# Patient Record
Sex: MALE | Race: WHITE | NOT HISPANIC OR LATINO | Employment: OTHER | URBAN - METROPOLITAN AREA
[De-identification: names, ages, dates, MRNs, and addresses within clinical notes are randomized per-mention and may not be internally consistent; named-entity substitution may affect disease eponyms.]

---

## 2023-04-20 ENCOUNTER — TRANSCRIBE ORDERS (OUTPATIENT)
Dept: ADMINISTRATIVE | Facility: HOSPITAL | Age: 63
End: 2023-04-20
Payer: MEDICARE

## 2023-04-20 DIAGNOSIS — R91.8 LUNG MASS: Primary | ICD-10-CM

## 2023-04-23 ENCOUNTER — APPOINTMENT (OUTPATIENT)
Dept: CT IMAGING | Facility: HOSPITAL | Age: 63
End: 2023-04-23
Payer: MEDICARE

## 2023-04-23 ENCOUNTER — HOSPITAL ENCOUNTER (INPATIENT)
Facility: HOSPITAL | Age: 63
LOS: 11 days | Discharge: NURSING, REHAB OR LONG-TERM CARE | End: 2023-05-04
Attending: EMERGENCY MEDICINE | Admitting: HOSPITALIST
Payer: MEDICARE

## 2023-04-23 ENCOUNTER — APPOINTMENT (OUTPATIENT)
Dept: GENERAL RADIOLOGY | Facility: HOSPITAL | Age: 63
End: 2023-04-23
Payer: MEDICARE

## 2023-04-23 DIAGNOSIS — N39.0 ACUTE UTI: ICD-10-CM

## 2023-04-23 DIAGNOSIS — N17.9 AKI (ACUTE KIDNEY INJURY): ICD-10-CM

## 2023-04-23 DIAGNOSIS — R91.8 MASS OF LEFT LUNG: Primary | ICD-10-CM

## 2023-04-23 DIAGNOSIS — Z78.9 FULL CODE STATUS: ICD-10-CM

## 2023-04-23 DIAGNOSIS — I48.91 ATRIAL FIBRILLATION WITH RVR: ICD-10-CM

## 2023-04-23 DIAGNOSIS — R41.82 ALTERED MENTAL STATUS, UNSPECIFIED ALTERED MENTAL STATUS TYPE: ICD-10-CM

## 2023-04-23 PROBLEM — N30.00 ACUTE CYSTITIS WITHOUT HEMATURIA: Status: ACTIVE | Noted: 2023-04-23

## 2023-04-23 PROBLEM — J96.12 CHRONIC RESPIRATORY FAILURE WITH HYPOXIA AND HYPERCAPNIA: Status: ACTIVE | Noted: 2023-04-23

## 2023-04-23 PROBLEM — G93.41 ACUTE METABOLIC ENCEPHALOPATHY: Status: ACTIVE | Noted: 2023-04-23

## 2023-04-23 PROBLEM — I48.0 PAROXYSMAL ATRIAL FIBRILLATION: Status: ACTIVE | Noted: 2023-04-23

## 2023-04-23 PROBLEM — J96.11 CHRONIC RESPIRATORY FAILURE WITH HYPOXIA AND HYPERCAPNIA: Status: ACTIVE | Noted: 2023-04-23

## 2023-04-23 PROBLEM — N18.32 STAGE 3B CHRONIC KIDNEY DISEASE: Status: ACTIVE | Noted: 2023-04-23

## 2023-04-23 PROBLEM — A41.9 SEPSIS WITHOUT ACUTE ORGAN DYSFUNCTION: Status: ACTIVE | Noted: 2023-04-23

## 2023-04-23 PROBLEM — J44.9 COPD (CHRONIC OBSTRUCTIVE PULMONARY DISEASE): Status: ACTIVE | Noted: 2023-04-23

## 2023-04-23 PROBLEM — E87.0 HYPERNATREMIA: Status: ACTIVE | Noted: 2023-04-23

## 2023-04-23 LAB
ALBUMIN SERPL-MCNC: 4.7 G/DL (ref 3.5–5.2)
ALBUMIN/GLOB SERPL: 1.3 G/DL
ALP SERPL-CCNC: 72 U/L (ref 39–117)
ALT SERPL W P-5'-P-CCNC: 16 U/L (ref 1–41)
AMPHET+METHAMPHET UR QL: NEGATIVE
ANION GAP SERPL CALCULATED.3IONS-SCNC: 12.8 MMOL/L (ref 5–15)
APTT PPP: 33.8 SECONDS (ref 22.7–35.4)
AST SERPL-CCNC: 54 U/L (ref 1–40)
BACTERIA UR QL AUTO: ABNORMAL /HPF
BARBITURATES UR QL SCN: NEGATIVE
BASOPHILS # BLD AUTO: 0.04 10*3/MM3 (ref 0–0.2)
BASOPHILS NFR BLD AUTO: 0.4 % (ref 0–1.5)
BENZODIAZ UR QL SCN: POSITIVE
BILIRUB SERPL-MCNC: 0.4 MG/DL (ref 0–1.2)
BILIRUB UR QL STRIP: NEGATIVE
BUN SERPL-MCNC: 58 MG/DL (ref 8–23)
BUN/CREAT SERPL: 22.3 (ref 7–25)
CALCIUM SPEC-SCNC: 10.1 MG/DL (ref 8.6–10.5)
CANNABINOIDS SERPL QL: NEGATIVE
CHLORIDE SERPL-SCNC: 102 MMOL/L (ref 98–107)
CLARITY UR: CLEAR
CO2 SERPL-SCNC: 33.2 MMOL/L (ref 22–29)
COCAINE UR QL: NEGATIVE
COLOR UR: YELLOW
CREAT SERPL-MCNC: 2.6 MG/DL (ref 0.76–1.27)
D-LACTATE SERPL-SCNC: 1.2 MMOL/L (ref 0.5–2)
DEPRECATED RDW RBC AUTO: 44 FL (ref 37–54)
EGFRCR SERPLBLD CKD-EPI 2021: 26.7 ML/MIN/1.73
EOSINOPHIL # BLD AUTO: 0.07 10*3/MM3 (ref 0–0.4)
EOSINOPHIL NFR BLD AUTO: 0.6 % (ref 0.3–6.2)
ERYTHROCYTE [DISTWIDTH] IN BLOOD BY AUTOMATED COUNT: 14.7 % (ref 12.3–15.4)
ETHANOL BLD-MCNC: <10 MG/DL (ref 0–10)
ETHANOL UR QL: <0.01 %
GEN 5 2HR TROPONIN T REFLEX: 31 NG/L
GLOBULIN UR ELPH-MCNC: 3.6 GM/DL
GLUCOSE BLDC GLUCOMTR-MCNC: 98 MG/DL (ref 70–130)
GLUCOSE SERPL-MCNC: 111 MG/DL (ref 65–99)
GLUCOSE UR STRIP-MCNC: NEGATIVE MG/DL
HCT VFR BLD AUTO: 33.6 % (ref 37.5–51)
HGB BLD-MCNC: 10.2 G/DL (ref 13–17.7)
HGB UR QL STRIP.AUTO: NEGATIVE
HYALINE CASTS UR QL AUTO: ABNORMAL /LPF
IMM GRANULOCYTES # BLD AUTO: 0.04 10*3/MM3 (ref 0–0.05)
IMM GRANULOCYTES NFR BLD AUTO: 0.4 % (ref 0–0.5)
INR PPP: 1.27 (ref 0.9–1.1)
KETONES UR QL STRIP: ABNORMAL
LEUKOCYTE ESTERASE UR QL STRIP.AUTO: ABNORMAL
LYMPHOCYTES # BLD AUTO: 1.4 10*3/MM3 (ref 0.7–3.1)
LYMPHOCYTES NFR BLD AUTO: 13 % (ref 19.6–45.3)
MCH RBC QN AUTO: 25 PG (ref 26.6–33)
MCHC RBC AUTO-ENTMCNC: 30.4 G/DL (ref 31.5–35.7)
MCV RBC AUTO: 82.4 FL (ref 79–97)
METHADONE UR QL SCN: NEGATIVE
MONOCYTES # BLD AUTO: 0.88 10*3/MM3 (ref 0.1–0.9)
MONOCYTES NFR BLD AUTO: 8.2 % (ref 5–12)
NEUTROPHILS NFR BLD AUTO: 77.4 % (ref 42.7–76)
NEUTROPHILS NFR BLD AUTO: 8.35 10*3/MM3 (ref 1.7–7)
NITRITE UR QL STRIP: POSITIVE
NRBC BLD AUTO-RTO: 0 /100 WBC (ref 0–0.2)
OPIATES UR QL: NEGATIVE
OXYCODONE UR QL SCN: NEGATIVE
PH UR STRIP.AUTO: 5.5 [PH] (ref 5–8)
PLATELET # BLD AUTO: 329 10*3/MM3 (ref 140–450)
PMV BLD AUTO: 9.3 FL (ref 6–12)
POTASSIUM SERPL-SCNC: 4.8 MMOL/L (ref 3.5–5.2)
PROT SERPL-MCNC: 8.3 G/DL (ref 6–8.5)
PROT UR QL STRIP: ABNORMAL
PROTHROMBIN TIME: 16.1 SECONDS (ref 11.7–14.2)
RBC # BLD AUTO: 4.08 10*6/MM3 (ref 4.14–5.8)
RBC # UR STRIP: ABNORMAL /HPF
REF LAB TEST METHOD: ABNORMAL
SODIUM SERPL-SCNC: 148 MMOL/L (ref 136–145)
SP GR UR STRIP: 1.02 (ref 1–1.03)
SQUAMOUS #/AREA URNS HPF: ABNORMAL /HPF
TROPONIN T DELTA: 6 NG/L
TROPONIN T SERPL HS-MCNC: 25 NG/L
URATE SERPL-MCNC: 8.5 MG/DL (ref 3.4–7)
UROBILINOGEN UR QL STRIP: ABNORMAL
WBC # UR STRIP: ABNORMAL /HPF
WBC NRBC COR # BLD: 10.78 10*3/MM3 (ref 3.4–10.8)

## 2023-04-23 PROCEDURE — 80307 DRUG TEST PRSMV CHEM ANLYZR: CPT | Performed by: PHYSICIAN ASSISTANT

## 2023-04-23 PROCEDURE — 87040 BLOOD CULTURE FOR BACTERIA: CPT | Performed by: PHYSICIAN ASSISTANT

## 2023-04-23 PROCEDURE — 85730 THROMBOPLASTIN TIME PARTIAL: CPT | Performed by: EMERGENCY MEDICINE

## 2023-04-23 PROCEDURE — 25010000002 CEFTRIAXONE PER 250 MG: Performed by: PHYSICIAN ASSISTANT

## 2023-04-23 PROCEDURE — 84484 ASSAY OF TROPONIN QUANT: CPT | Performed by: PHYSICIAN ASSISTANT

## 2023-04-23 PROCEDURE — 25010000002 CEFTRIAXONE PER 250 MG: Performed by: NURSE PRACTITIONER

## 2023-04-23 PROCEDURE — 83605 ASSAY OF LACTIC ACID: CPT | Performed by: PHYSICIAN ASSISTANT

## 2023-04-23 PROCEDURE — 81001 URINALYSIS AUTO W/SCOPE: CPT | Performed by: PHYSICIAN ASSISTANT

## 2023-04-23 PROCEDURE — 93005 ELECTROCARDIOGRAM TRACING: CPT | Performed by: HOSPITALIST

## 2023-04-23 PROCEDURE — 87086 URINE CULTURE/COLONY COUNT: CPT | Performed by: PHYSICIAN ASSISTANT

## 2023-04-23 PROCEDURE — 82077 ASSAY SPEC XCP UR&BREATH IA: CPT | Performed by: PHYSICIAN ASSISTANT

## 2023-04-23 PROCEDURE — 82962 GLUCOSE BLOOD TEST: CPT

## 2023-04-23 PROCEDURE — 93010 ELECTROCARDIOGRAM REPORT: CPT | Performed by: INTERNAL MEDICINE

## 2023-04-23 PROCEDURE — 99285 EMERGENCY DEPT VISIT HI MDM: CPT

## 2023-04-23 PROCEDURE — 71045 X-RAY EXAM CHEST 1 VIEW: CPT

## 2023-04-23 PROCEDURE — 93005 ELECTROCARDIOGRAM TRACING: CPT | Performed by: EMERGENCY MEDICINE

## 2023-04-23 PROCEDURE — 80053 COMPREHEN METABOLIC PANEL: CPT | Performed by: PHYSICIAN ASSISTANT

## 2023-04-23 PROCEDURE — 90791 PSYCH DIAGNOSTIC EVALUATION: CPT

## 2023-04-23 PROCEDURE — 25010000002 DROPERIDOL PER 5 MG: Performed by: PHYSICIAN ASSISTANT

## 2023-04-23 PROCEDURE — 70450 CT HEAD/BRAIN W/O DYE: CPT

## 2023-04-23 PROCEDURE — 85025 COMPLETE CBC W/AUTO DIFF WBC: CPT | Performed by: PHYSICIAN ASSISTANT

## 2023-04-23 PROCEDURE — 51798 US URINE CAPACITY MEASURE: CPT

## 2023-04-23 PROCEDURE — 74176 CT ABD & PELVIS W/O CONTRAST: CPT

## 2023-04-23 PROCEDURE — 85610 PROTHROMBIN TIME: CPT | Performed by: EMERGENCY MEDICINE

## 2023-04-23 PROCEDURE — 84550 ASSAY OF BLOOD/URIC ACID: CPT | Performed by: HOSPITALIST

## 2023-04-23 PROCEDURE — 25010000002 DIGOXIN PER 500 MCG: Performed by: PHYSICIAN ASSISTANT

## 2023-04-23 RX ORDER — SODIUM CHLORIDE 9 MG/ML
125 INJECTION, SOLUTION INTRAVENOUS CONTINUOUS
Status: DISCONTINUED | OUTPATIENT
Start: 2023-04-23 | End: 2023-04-24

## 2023-04-23 RX ORDER — METOPROLOL TARTRATE 50 MG/1
50 TABLET, FILM COATED ORAL DAILY
Status: ON HOLD | COMMUNITY
End: 2023-05-03 | Stop reason: SDUPTHER

## 2023-04-23 RX ORDER — BISACODYL 5 MG/1
5 TABLET, DELAYED RELEASE ORAL DAILY PRN
Status: DISCONTINUED | OUTPATIENT
Start: 2023-04-23 | End: 2023-05-04 | Stop reason: HOSPADM

## 2023-04-23 RX ORDER — VALPROIC ACID 250 MG/5ML
250 SOLUTION ORAL DAILY
COMMUNITY
End: 2023-05-04 | Stop reason: HOSPADM

## 2023-04-23 RX ORDER — ATORVASTATIN CALCIUM 20 MG/1
20 TABLET, FILM COATED ORAL DAILY
COMMUNITY
End: 2023-05-04 | Stop reason: HOSPADM

## 2023-04-23 RX ORDER — ACETAMINOPHEN 650 MG/1
650 SUPPOSITORY RECTAL EVERY 4 HOURS PRN
Status: DISCONTINUED | OUTPATIENT
Start: 2023-04-23 | End: 2023-04-23

## 2023-04-23 RX ORDER — SODIUM CHLORIDE 0.9 % (FLUSH) 0.9 %
10 SYRINGE (ML) INJECTION AS NEEDED
Status: DISCONTINUED | OUTPATIENT
Start: 2023-04-23 | End: 2023-05-04 | Stop reason: HOSPADM

## 2023-04-23 RX ORDER — DEXTROSE MONOHYDRATE 50 MG/ML
75 INJECTION, SOLUTION INTRAVENOUS CONTINUOUS
Status: DISCONTINUED | OUTPATIENT
Start: 2023-04-23 | End: 2023-04-23

## 2023-04-23 RX ORDER — DILTIAZEM HYDROCHLORIDE 5 MG/ML
5 INJECTION INTRAVENOUS ONCE
Status: COMPLETED | OUTPATIENT
Start: 2023-04-24 | End: 2023-04-23

## 2023-04-23 RX ORDER — GUAIFENESIN 200 MG/10ML
600 LIQUID ORAL 2 TIMES DAILY
Status: ON HOLD | COMMUNITY
End: 2023-04-26

## 2023-04-23 RX ORDER — LANSOPRAZOLE 30 MG/1
30 CAPSULE, DELAYED RELEASE ORAL DAILY
COMMUNITY

## 2023-04-23 RX ORDER — ONDANSETRON 2 MG/ML
4 INJECTION INTRAMUSCULAR; INTRAVENOUS EVERY 6 HOURS PRN
Status: DISCONTINUED | OUTPATIENT
Start: 2023-04-23 | End: 2023-05-04 | Stop reason: HOSPADM

## 2023-04-23 RX ORDER — AMOXICILLIN 250 MG
2 CAPSULE ORAL 2 TIMES DAILY
Status: DISCONTINUED | OUTPATIENT
Start: 2023-04-23 | End: 2023-05-04 | Stop reason: HOSPADM

## 2023-04-23 RX ORDER — DOCUSATE SODIUM 100 MG/1
100 CAPSULE, LIQUID FILLED ORAL 2 TIMES DAILY
Status: ON HOLD | COMMUNITY
End: 2023-04-26

## 2023-04-23 RX ORDER — UREA 10 %
3 LOTION (ML) TOPICAL NIGHTLY PRN
Status: ON HOLD | COMMUNITY
End: 2023-05-03 | Stop reason: SDUPTHER

## 2023-04-23 RX ORDER — POLYETHYLENE GLYCOL 3350 17 G/17G
17 POWDER, FOR SOLUTION ORAL DAILY PRN
Status: DISCONTINUED | OUTPATIENT
Start: 2023-04-23 | End: 2023-05-04 | Stop reason: HOSPADM

## 2023-04-23 RX ORDER — SODIUM CHLORIDE 9 MG/ML
40 INJECTION, SOLUTION INTRAVENOUS AS NEEDED
Status: DISCONTINUED | OUTPATIENT
Start: 2023-04-23 | End: 2023-05-04 | Stop reason: HOSPADM

## 2023-04-23 RX ORDER — DIGOXIN 0.25 MG/ML
250 INJECTION INTRAMUSCULAR; INTRAVENOUS ONCE
Status: COMPLETED | OUTPATIENT
Start: 2023-04-23 | End: 2023-04-23

## 2023-04-23 RX ORDER — NITROGLYCERIN 0.4 MG/1
0.4 TABLET SUBLINGUAL
Status: DISCONTINUED | OUTPATIENT
Start: 2023-04-23 | End: 2023-05-04 | Stop reason: HOSPADM

## 2023-04-23 RX ORDER — ACETAMINOPHEN 325 MG/1
650 TABLET ORAL EVERY 6 HOURS PRN
COMMUNITY

## 2023-04-23 RX ORDER — DROPERIDOL 2.5 MG/ML
5 INJECTION, SOLUTION INTRAMUSCULAR; INTRAVENOUS ONCE
Status: COMPLETED | OUTPATIENT
Start: 2023-04-23 | End: 2023-04-23

## 2023-04-23 RX ORDER — ACETAMINOPHEN 325 MG/1
650 TABLET ORAL EVERY 4 HOURS PRN
Status: DISCONTINUED | OUTPATIENT
Start: 2023-04-23 | End: 2023-05-04 | Stop reason: HOSPADM

## 2023-04-23 RX ORDER — ONDANSETRON 4 MG/1
4 TABLET, FILM COATED ORAL EVERY 6 HOURS PRN
Status: DISCONTINUED | OUTPATIENT
Start: 2023-04-23 | End: 2023-05-04 | Stop reason: HOSPADM

## 2023-04-23 RX ORDER — LEVOFLOXACIN 750 MG/1
750 TABLET ORAL DAILY
COMMUNITY
End: 2023-05-04 | Stop reason: HOSPADM

## 2023-04-23 RX ORDER — OLANZAPINE 10 MG/1
10 TABLET ORAL 2 TIMES DAILY
COMMUNITY

## 2023-04-23 RX ORDER — BISACODYL 10 MG
10 SUPPOSITORY, RECTAL RECTAL DAILY PRN
Status: DISCONTINUED | OUTPATIENT
Start: 2023-04-23 | End: 2023-05-04 | Stop reason: HOSPADM

## 2023-04-23 RX ORDER — ALUMINA, MAGNESIA, AND SIMETHICONE 2400; 2400; 240 MG/30ML; MG/30ML; MG/30ML
15 SUSPENSION ORAL EVERY 6 HOURS PRN
Status: DISCONTINUED | OUTPATIENT
Start: 2023-04-23 | End: 2023-05-04 | Stop reason: HOSPADM

## 2023-04-23 RX ORDER — ACETAMINOPHEN 160 MG/5ML
650 SOLUTION ORAL EVERY 4 HOURS PRN
Status: DISCONTINUED | OUTPATIENT
Start: 2023-04-23 | End: 2023-04-23

## 2023-04-23 RX ORDER — LEVETIRACETAM 500 MG/1
500 TABLET ORAL 2 TIMES DAILY
COMMUNITY

## 2023-04-23 RX ORDER — ALBUTEROL SULFATE 90 UG/1
2 AEROSOL, METERED RESPIRATORY (INHALATION) EVERY 4 HOURS PRN
COMMUNITY

## 2023-04-23 RX ADMIN — DROPERIDOL 5 MG: 2.5 INJECTION, SOLUTION INTRAMUSCULAR; INTRAVENOUS at 17:58

## 2023-04-23 RX ADMIN — Medication 10 ML: at 22:40

## 2023-04-23 RX ADMIN — DILTIAZEM HYDROCHLORIDE 5 MG: 5 INJECTION INTRAVENOUS at 23:40

## 2023-04-23 RX ADMIN — CEFTRIAXONE SODIUM 1 G: 1 INJECTION, POWDER, FOR SOLUTION INTRAMUSCULAR; INTRAVENOUS at 19:32

## 2023-04-23 RX ADMIN — CEFTRIAXONE SODIUM 2 G: 2 INJECTION, POWDER, FOR SOLUTION INTRAMUSCULAR; INTRAVENOUS at 22:46

## 2023-04-23 RX ADMIN — SODIUM CHLORIDE 1000 ML: 9 INJECTION, SOLUTION INTRAVENOUS at 18:02

## 2023-04-23 RX ADMIN — DIGOXIN 250 MCG: 0.25 INJECTION INTRAMUSCULAR; INTRAVENOUS at 19:05

## 2023-04-23 RX ADMIN — SODIUM CHLORIDE 125 ML/HR: 9 INJECTION, SOLUTION INTRAVENOUS at 22:41

## 2023-04-23 RX ADMIN — DIGOXIN 250 MCG: 0.25 INJECTION INTRAMUSCULAR; INTRAVENOUS at 20:02

## 2023-04-23 RX ADMIN — METOPROLOL TARTRATE 25 MG: 25 TABLET, FILM COATED ORAL at 18:00

## 2023-04-23 RX ADMIN — SODIUM CHLORIDE 1000 ML: 9 INJECTION, SOLUTION INTRAVENOUS at 20:03

## 2023-04-23 RX ADMIN — METOPROLOL TARTRATE 5 MG: 1 INJECTION, SOLUTION INTRAVENOUS at 17:58

## 2023-04-23 NOTE — ED PROVIDER NOTES
EMERGENCY DEPARTMENT ENCOUNTER    Room Number:  06/06  Date of encounter:  4/23/2023  PCP: Provider, No Known  Historian: Patient, nursing home report  Chronic or social conditions impacting care (social determinants of health): Nursing home resident      I used full protective equipment while examining this patient.  This includes face mask, gloves and protective eyewear.  I washed my hands before entering the room and immediately upon leaving the room      HPI:  Chief Complaint: Altered mental status, aggression  A complete HPI/ROS/PMH/PSH/SH/FH are unobtainable due to: Altered mental status    Context: Kodi Tolliver is a 63 y.o. male who presents to the ED c/o reports of altered mental status, aggression at the nursing home.  Patient is unable to give any reliable history.  He does have a history of schizophrenia.  Patient is fairly pleasant at this time.    Of note patient's atrial fibrillation is acting up.  He does have a history of A-fib.  He is on metoprolol and takes Eliquis.    Review of prior external notes (non-ED):   I reviewed outpatient admission to [facility] from 9/15/2022.  Patient admitted for sepsis, pneumonia.    Review of prior external test results outside of this encounter:  I reviewed chest x-ray report from 10/1/2022.  This showed a left perihilar mass.    PAST MEDICAL HISTORY  Active Ambulatory Problems     Diagnosis Date Noted   • No Active Ambulatory Problems     Resolved Ambulatory Problems     Diagnosis Date Noted   • No Resolved Ambulatory Problems     No Additional Past Medical History         PAST SURGICAL HISTORY  No past surgical history on file.      FAMILY HISTORY  No family history on file.      SOCIAL HISTORY  Social History     Socioeconomic History   • Marital status: Unknown         ALLERGIES  Patient has no known allergies.        REVIEW OF SYSTEMS  Unobtainable secondary to altered mental status      PHYSICAL EXAM    I have reviewed the triage vital signs and nursing  notes.    ED Triage Vitals [04/23/23 1542]   Temp Heart Rate Resp BP SpO2   97.6 °F (36.4 °C) (!) 132 18 126/77 --      Temp src Heart Rate Source Patient Position BP Location FiO2 (%)   -- -- -- -- --       Physical Exam  GENERAL: Alert, nonsensical speech, not distressed  HENT: head atraumatic, no nuchal rigidity  EYES: no scleral icterus, EOMI  CV: Irregular rhythm, tachycardic rate, no murmur  RESPIRATORY: normal effort, CTA  ABDOMEN: soft, nontender.  Healed gastrostomy stoma.  MUSCULOSKELETAL: no deformity, FROM, no calf swelling or tenderness  NEURO: alert, moves all extremities, follows commands  SKIN: warm, dry        LAB RESULTS  Recent Results (from the past 24 hour(s))   Comprehensive Metabolic Panel    Collection Time: 04/23/23  4:08 PM    Specimen: Blood   Result Value Ref Range    Glucose 111 (H) 65 - 99 mg/dL    BUN 58 (H) 8 - 23 mg/dL    Creatinine 2.60 (H) 0.76 - 1.27 mg/dL    Sodium 148 (H) 136 - 145 mmol/L    Potassium 4.8 3.5 - 5.2 mmol/L    Chloride 102 98 - 107 mmol/L    CO2 33.2 (H) 22.0 - 29.0 mmol/L    Calcium 10.1 8.6 - 10.5 mg/dL    Total Protein 8.3 6.0 - 8.5 g/dL    Albumin 4.7 3.5 - 5.2 g/dL    ALT (SGPT) 16 1 - 41 U/L    AST (SGOT) 54 (H) 1 - 40 U/L    Alkaline Phosphatase 72 39 - 117 U/L    Total Bilirubin 0.4 0.0 - 1.2 mg/dL    Globulin 3.6 gm/dL    A/G Ratio 1.3 g/dL    BUN/Creatinine Ratio 22.3 7.0 - 25.0    Anion Gap 12.8 5.0 - 15.0 mmol/L    eGFR 26.7 (L) >60.0 mL/min/1.73   Ethanol    Collection Time: 04/23/23  4:08 PM    Specimen: Blood   Result Value Ref Range    Ethanol <10 0 - 10 mg/dL    Ethanol % <0.010 %   CBC Auto Differential    Collection Time: 04/23/23  4:08 PM    Specimen: Blood   Result Value Ref Range    WBC 10.78 3.40 - 10.80 10*3/mm3    RBC 4.08 (L) 4.14 - 5.80 10*6/mm3    Hemoglobin 10.2 (L) 13.0 - 17.7 g/dL    Hematocrit 33.6 (L) 37.5 - 51.0 %    MCV 82.4 79.0 - 97.0 fL    MCH 25.0 (L) 26.6 - 33.0 pg    MCHC 30.4 (L) 31.5 - 35.7 g/dL    RDW 14.7 12.3 - 15.4 %     RDW-SD 44.0 37.0 - 54.0 fl    MPV 9.3 6.0 - 12.0 fL    Platelets 329 140 - 450 10*3/mm3    Neutrophil % 77.4 (H) 42.7 - 76.0 %    Lymphocyte % 13.0 (L) 19.6 - 45.3 %    Monocyte % 8.2 5.0 - 12.0 %    Eosinophil % 0.6 0.3 - 6.2 %    Basophil % 0.4 0.0 - 1.5 %    Immature Grans % 0.4 0.0 - 0.5 %    Neutrophils, Absolute 8.35 (H) 1.70 - 7.00 10*3/mm3    Lymphocytes, Absolute 1.40 0.70 - 3.10 10*3/mm3    Monocytes, Absolute 0.88 0.10 - 0.90 10*3/mm3    Eosinophils, Absolute 0.07 0.00 - 0.40 10*3/mm3    Basophils, Absolute 0.04 0.00 - 0.20 10*3/mm3    Immature Grans, Absolute 0.04 0.00 - 0.05 10*3/mm3    nRBC 0.0 0.0 - 0.2 /100 WBC   High Sensitivity Troponin T    Collection Time: 04/23/23  4:08 PM    Specimen: Blood   Result Value Ref Range    HS Troponin T 25 (H) <15 ng/L   Urinalysis With Microscopic If Indicated (No Culture) - Urine, Clean Catch    Collection Time: 04/23/23  4:19 PM    Specimen: Urine, Clean Catch   Result Value Ref Range    Color, UA Yellow Yellow, Straw    Appearance, UA Clear Clear    pH, UA 5.5 5.0 - 8.0    Specific Gravity, UA 1.020 1.005 - 1.030    Glucose, UA Negative Negative    Ketones, UA Trace (A) Negative    Bilirubin, UA Negative Negative    Blood, UA Negative Negative    Protein, UA Trace (A) Negative    Leuk Esterase, UA Moderate (2+) (A) Negative    Nitrite, UA Positive (A) Negative    Urobilinogen, UA 0.2 E.U./dL 0.2 - 1.0 E.U./dL   Urine Drug Screen - Urine, Clean Catch    Collection Time: 04/23/23  4:19 PM    Specimen: Urine, Clean Catch   Result Value Ref Range    Amphet/Methamphet, Screen Negative Negative    Barbiturates Screen, Urine Negative Negative    Benzodiazepine Screen, Urine Positive (A) Negative    Cocaine Screen, Urine Negative Negative    Opiate Screen Negative Negative    THC, Screen, Urine Negative Negative    Methadone Screen, Urine Negative Negative    Oxycodone Screen, Urine Negative Negative   Urinalysis, Microscopic Only - Urine, Clean Catch    Collection  Time: 04/23/23  4:19 PM    Specimen: Urine, Clean Catch   Result Value Ref Range    RBC, UA 0-2 None Seen, 0-2 /HPF    WBC, UA Too Numerous to Count (A) None Seen, 0-2 /HPF    Bacteria, UA 4+ (A) None Seen /HPF    Squamous Epithelial Cells, UA 0-2 None Seen, 0-2 /HPF    Hyaline Casts, UA 3-6 None Seen /LPF    Methodology Automated Microscopy    ECG 12 Lead Tachycardia    Collection Time: 04/23/23  5:11 PM   Result Value Ref Range    QT Interval 289 ms   Protime-INR    Collection Time: 04/23/23  5:20 PM    Specimen: Blood   Result Value Ref Range    Protime 16.1 (H) 11.7 - 14.2 Seconds    INR 1.27 (H) 0.90 - 1.10   aPTT    Collection Time: 04/23/23  5:20 PM    Specimen: Blood   Result Value Ref Range    PTT 33.8 22.7 - 35.4 seconds   Lactic Acid, Plasma    Collection Time: 04/23/23  6:21 PM    Specimen: Blood   Result Value Ref Range    Lactate 1.2 0.5 - 2.0 mmol/L       Ordered the above labs and independently reviewed the results.    Critical Care  Performed by: Marcel Gaines PA  Authorized by: Pito Helm MD     Critical care provider statement:     Critical care time (minutes):  34    Critical care time was exclusive of:  Separately billable procedures and treating other patients    Critical care was necessary to treat or prevent imminent or life-threatening deterioration of the following conditions:  Renal failure, circulatory failure, CNS failure or compromise and dehydration    Critical care was time spent personally by me on the following activities:  Blood draw for specimens, discussions with consultants, evaluation of patient's response to treatment, ordering and performing treatments and interventions, ordering and review of laboratory studies, ordering and review of radiographic studies, review of old charts, pulse oximetry, re-evaluation of patient's condition, examination of patient, interpretation of cardiac output measurements and obtaining history from patient or  [facility]          RADIOLOGY  CT Abdomen Pelvis Without Contrast    Result Date: 4/23/2023  CT ABDOMEN AND PELVIS WITHOUT IV CONTRAST  HISTORY: 63-year-old male with UTI. Renal failure.  TECHNIQUE: Radiation dose reduction techniques were utilized, including automated exposure control and exposure modulation based on body size. 3 mm images were obtained through the abdomen and pelvis without the administration of IV contrast. There are no priors for comparison.  FINDINGS: Extensive breathing artifact. There are no renal or ureteral stones and there is no hydronephrosis bilaterally. The urinary bladder is mildly distended and has a thickened wall. There is haziness surrounding the prostate which measures approximately 5 cm in diameter. There is a large volume of formed stool in the rectosigmoid colon with anterior mass effect on the urinary bladder. There is otherwise an average volume of formed stool within the colon. There is a mild colonic ileus. The appendix appears normal. There is a 3.8 cm infrarenal abdominal aortic aneurysm which extends to the bifurcation. There is a tiny umbilical hernia containing fat.      1. There is likely acute prostatitis and cystitis. Please correlate clinically. There are no renal or ureteral stones and there is no hydronephrosis bilaterally. 2. Large volume of formed stool within the rectum with anterior mass effect on the urinary bladder. Fecal impaction is suspected. 3. There is a 3.8 cm infrarenal abdominal aortic aneurysm.      CT Head Without Contrast    Result Date: 4/23/2023  Patient: ELSY LEE  Time Out: 18:33 Exam(s): CT HEAD Without Contrast EXAM:   CT Head Without Intravenous Contrast CLINICAL HISTORY:    Reason for exam: AMS. TECHNIQUE:   Axial computed tomography images of the head brain without intravenous contrast.  CTDI is 55.44 mGy and DLP is 1026.2 mGy-cm.  This CT exam was performed according to the principle of ALARA (As Low As Reasonably Achievable) by using  one or more of the following dose reduction techniques: automated exposure control, adjustment of the mA and or kV according to patient size, and or use of iterative reconstruction technique. COMPARISON:   No relevant prior studies available. FINDINGS:   Brain:  Unremarkable.  No hemorrhage.  No significant white matter disease.  No edema.   Ventricles:  Unremarkable.  No ventriculomegaly.   Bones joints:  Unremarkable.  No acute fracture.   Soft tissues:  Unremarkable.   Sinuses:  Unremarkable as visualized.  No acute sinusitis.   Mastoid air cells:  Unremarkable as visualized.  No mastoid effusion. IMPRESSION:       Normal head brain CT.     Electronically signed by Yoan Pardo MD on 04-23-23 at 1833    XR Chest 1 View    Result Date: 4/23/2023  XR CHEST 1 VW-  HISTORY: 63-year-old male with altered mental status.  FINDINGS: There is an approximately 6 cm mass in the perihilar region of the left upper lobe. Both bairon appear prominent and may represent lymphadenopathy. There is no evidence for CHF. Further evaluation with chest CT is recommended.        I ordered the above noted radiological studies. Reviewed by me and discussed with radiologist.  See dictation for official radiology interpretation.      MEDICATIONS GIVEN IN ER    Medications   cefTRIAXone (ROCEPHIN) 1 g in sodium chloride 0.9 % 100 mL IVPB-VTB (1 g Intravenous New Bag 4/23/23 1932)   digoxin (LANOXIN) injection 250 mcg (has no administration in time range)   sodium chloride 0.9 % bolus 1,000 mL (has no administration in time range)   sodium chloride 0.9 % bolus 1,000 mL (1,000 mL Intravenous New Bag 4/23/23 1802)   droperidol (INAPSINE) injection 5 mg (5 mg Intravenous Given 4/23/23 1758)   metoprolol tartrate (LOPRESSOR) tablet 25 mg (25 mg Oral Given 4/23/23 1800)   metoprolol tartrate (LOPRESSOR) injection 5 mg (5 mg Intravenous Given 4/23/23 1758)   digoxin (LANOXIN) injection 250 mcg (250 mcg Intravenous Given 4/23/23 1905)          ADDITIONAL ORDERS CONSIDERED BUT NOT ORDERED:  Nothing      PROGRESS, DATA ANALYSIS, CONSULTS, AND MEDICAL DECISION MAKING    All labs have been independently interpreted by myself.  All radiology studies have been independently interpreted by myself and discussed with radiologist dictating the report.   EKG's independently interpreted by myself.  Discussion below represents my analysis of pertinent findings related to patient's condition, differential diagnosis, treatment plan and final disposition.    I have discussed case with Dr. Helm, emergency room physician.  He has performed his own bedside examination and agrees with treatment plan.    ED Course as of 04/23/23 1957   Sun Apr 23, 2023   1608 Patient presents from nursing home with reports of altered mental status, aggression.  Patient does have a history of schizophrenia.  We will have [facility] evaluate the patient. [EE]   1630 I spoke with the nursing home nurse who is caring for Mr. Tolliver.  She reports that he was sent in for aggression and altered mental status.  Of note they started the patient on Levaquin for suspected pneumonia. [EE]   1721 EKG independently viewed and contemporaneously interpreted by ED physician. Time: 1711.  Rate 134.  Interpretation: Sinus tachycardia, normal axis, left atrial enlargement, delayed R wave progression, no acute ST changes.  Significant artifact present. [JG]   1729 Bacteria, UA(!): 4+ [EE]   1730 Creatinine(!): 2.60  This was 0.8 on 11/2/22 [EE]   1734 Patient with UTI, acute renal failure.  Will plan for medical admission. [EE]   1739 Love from [facility] has seen and evaluated the patient.  The patient is not terribly cooperative at this time.  He has multiple reasons for medical admission.  [facility] will follow him during his admission. [EE]   1832 BP: 90/72 [EE]   1852 Patient's heart rate still in the 160s.  Blood pressure in the 90s systolically.  We will hold off on any additional Lopressor.  IV digoxin  ordered. [EE]   1942 I discussed case with Sam Killian, nurse practitioner with [facility].  She agrees to admit to Dr. Cuadra. [EE]   1942 Patient's heart rate in the 130s still blood pressure 95/69.  Additional digoxin ordered. [EE]   1948 I discussed case with Dr. Jaime, radiology.  He agrees with treatment for A-fib thus far.  He will consult on the patient. [EE]   1955 Recheck of patient.  O2 sats 96%.  Heart rate in the 180s still.  Patient told plan for admission. [EE]      ED Course User Index  [EE] Marcel Gaines PA  [JG] Pito Helm MD       AS OF 19:57 EDT VITALS:    BP - 95/69  HR - (!) 135  TEMP - 97.6 °F (36.4 °C)  O2 SATS - 96%        DIAGNOSIS  Final diagnoses:   Acute UTI   MARYJANE (acute kidney injury)   Atrial fibrillation with RVR   Altered mental status, unspecified altered mental status type   Full code status         DISPOSITION  Admitted      Dictated utilizing Dragon dictation     Marcel Gaines PA  04/23/23 2000

## 2023-04-23 NOTE — ED TRIAGE NOTES
Pt from [facility] facility via ems for a psych eval. Staff reports abnormal behavior, walking through hallways being aggressive, talking to self

## 2023-04-23 NOTE — ED NOTES
Pt placed on bed alarm. PA and this RN obtained EKG, bloodwork, and started IV. [facility] at bedside. Pt is disoriented.

## 2023-04-23 NOTE — H&P
Patient Name:  Kodi Tolliver  YOB: 1960  MRN:  [id number]  Admit Date:  4/23/2023  Patient Care Team:  Provider, No Known as PCP - General      Subjective   History Present Illness     Chief Complaint   Patient presents with   • Psychiatric Evaluation     History of Present Illness   Mr. Tolliver is a 63 y.o. non-smoker with a history of chronic diastolic CHF, previous V-fib arrest, schizophrenia, essential hypertension, chronic respiratory failure, CKD stage III, microcytic anemia, HLD, PE, seizure disorder, CVA, and atrial fibrillation on chronic anticoagulation that presents to [facility] for psych evaluation.  Patient is a nursing home resident and staff noted today abnormal behavior including aggressiveness with staff and talking to self.  Upon presentation to the ED, he was altered and actively having hallucinations.  Work-up revealed an acute UTI.  CT A/P shows acute prostatitis and cystitis.  There is a large volume of formed stool within the rectum, fecal impaction suspected.  Labs obtained showed troponin of 25, sodium 148, creat 2.60, BUN 58, and hemoglobin 10.2.    Review of Systems   Unable to perform ROS: Mental status change        Personal History     Past Medical History:   Diagnosis Date   • Anxiety    • Aphonia    • Atrial fibrillation    • CHF (congestive heart failure)    • COPD (chronic obstructive pulmonary disease)    • Depression    • Essential hypertension    • Schizophrenia      History reviewed. No pertinent surgical history.  History reviewed. No pertinent family history.  Social History     Tobacco Use   • Smoking status: Unknown     No current facility-administered medications on file prior to encounter.     Current Outpatient Medications on File Prior to Encounter   Medication Sig Dispense Refill   • apixaban (ELIQUIS) 5 MG tablet tablet Take 1 tablet by mouth 2 (Two) Times a Day.     • atorvastatin (LIPITOR) 20 MG tablet Take 1 tablet by mouth Daily.     •  docusate sodium (COLACE) 100 MG capsule Take 1 capsule by mouth 2 (Two) Times a Day.     • guaifenesin (ROBITUSSIN) 100 MG/5ML liquid Take 10 mL by mouth 3 (Three) Times a Day As Needed for Cough.     • lansoprazole (PREVACID) 30 MG capsule Take 1 capsule by mouth Daily.     • levETIRAcetam (KEPPRA) 500 MG tablet Take 1 tablet by mouth 2 (Two) Times a Day.     • levoFLOXacin (LEVAQUIN) 750 MG tablet Take 1 tablet by mouth Daily.     • melatonin 1 MG tablet Take 3 tablets by mouth At Night As Needed for Sleep.     • metoprolol tartrate (LOPRESSOR) 50 MG tablet Take 1 tablet by mouth Daily.     • OLANZapine (zyPREXA) 10 MG tablet Take 1 tablet by mouth 2 (Two) Times a Day.     • Valproic Acid (DEPAKENE) 250 MG/5ML solution syrup Take 5 mL by mouth.       No Known Allergies    Objective    Objective     Vital Signs  Temp:  [97.6 °F (36.4 °C)] 97.6 °F (36.4 °C)  Heart Rate:  [114-199] 135  Resp:  [18] 18  BP: ()/() 97/85  SpO2:  [70 %-93 %] 92 %  on  Flow (L/min):  [2-4] 4;   Device (Oxygen Therapy): room air  There is no height or weight on file to calculate BMI.    Physical Exam  Vitals and nursing note reviewed.   Constitutional:       General: He is not in acute distress.     Appearance: He is well-developed. He is not toxic-appearing.      Comments: Chronically ill-appearing, appears older than stated age.   HENT:      Head: Normocephalic and atraumatic.   Eyes:      General: No scleral icterus.        Right eye: No discharge.         Left eye: No discharge.      Conjunctiva/sclera: Conjunctivae normal.   Neck:      Vascular: No JVD.   Cardiovascular:      Rate and Rhythm: Tachycardia present. Rhythm irregularly irregular.      Heart sounds: Normal heart sounds. No murmur heard.    No friction rub. No gallop.   Pulmonary:      Effort: Pulmonary effort is normal. No respiratory distress.      Breath sounds: Normal breath sounds. No wheezing or rales.   Abdominal:      General: Bowel sounds are normal.  There is no distension.      Palpations: Abdomen is soft.      Tenderness: There is no abdominal tenderness. There is no guarding.   Musculoskeletal:         General: No tenderness or deformity. Normal range of motion.      Cervical back: Normal range of motion and neck supple.   Skin:     General: Skin is warm and dry.      Capillary Refill: Capillary refill takes less than 2 seconds.   Neurological:      Mental Status: He is alert. He is disoriented and confused.      Cranial Nerves: Facial asymmetry (  Mild, baseline ) present.      Comments: Right hand contracted   Psychiatric:         Mood and Affect: Affect is labile.         Behavior: Behavior is cooperative.         Cognition and Memory: Cognition is impaired. Memory is impaired.      Comments: Aphasia       Results Review:  I reviewed the patient's new clinical results.  I reviewed the patient's new imaging results and agree with the interpretation.  I reviewed the patient's other test results and agree with the interpretation  I personally viewed and interpreted the patient's EKG/Telemetry data  Discussed with ED provider.    Lab Results (last 24 hours)     Procedure Component Value Units Date/Time    CBC & Differential [[id number]]  (Abnormal) Collected: 04/23/23 1608    Specimen: Blood Updated: 04/23/23 1646    Narrative:      The following orders were created for panel order CBC & Differential.  Procedure                               Abnormality         Status                     ---------                               -----------         ------                     CBC Auto Differential[[id number]]        Abnormal            Final result                 Please view results for these tests on the individual orders.    Comprehensive Metabolic Panel [[id number]]  (Abnormal) Collected: 04/23/23 1608    Specimen: Blood Updated: 04/23/23 1704     Glucose 111 mg/dL      BUN 58 mg/dL      Creatinine 2.60 mg/dL      Sodium 148 mmol/L      Potassium 4.8 mmol/L       Comment: Slight hemolysis detected by analyzer. Results may be affected.        Chloride 102 mmol/L      CO2 33.2 mmol/L      Calcium 10.1 mg/dL      Total Protein 8.3 g/dL      Albumin 4.7 g/dL      ALT (SGPT) 16 U/L      AST (SGOT) 54 U/L      Alkaline Phosphatase 72 U/L      Total Bilirubin 0.4 mg/dL      Globulin 3.6 gm/dL      A/G Ratio 1.3 g/dL      BUN/Creatinine Ratio 22.3     Anion Gap 12.8 mmol/L      eGFR 26.7 mL/min/1.73     Narrative:      GFR Normal >60  Chronic Kidney Disease <60  Kidney Failure <15      Ethanol [[id number]] Collected: 04/23/23 1608    Specimen: Blood Updated: 04/23/23 1704     Ethanol <10 mg/dL      Ethanol % <0.010 %     CBC Auto Differential [[id number]]  (Abnormal) Collected: 04/23/23 1608    Specimen: Blood Updated: 04/23/23 1646     WBC 10.78 10*3/mm3      RBC 4.08 10*6/mm3      Hemoglobin 10.2 g/dL      Hematocrit 33.6 %      MCV 82.4 fL      MCH 25.0 pg      MCHC 30.4 g/dL      RDW 14.7 %      RDW-SD 44.0 fl      MPV 9.3 fL      Platelets 329 10*3/mm3      Neutrophil % 77.4 %      Lymphocyte % 13.0 %      Monocyte % 8.2 %      Eosinophil % 0.6 %      Basophil % 0.4 %      Immature Grans % 0.4 %      Neutrophils, Absolute 8.35 10*3/mm3      Lymphocytes, Absolute 1.40 10*3/mm3      Monocytes, Absolute 0.88 10*3/mm3      Eosinophils, Absolute 0.07 10*3/mm3      Basophils, Absolute 0.04 10*3/mm3      Immature Grans, Absolute 0.04 10*3/mm3      nRBC 0.0 /100 WBC     High Sensitivity Troponin T [[id number]]  (Abnormal) Collected: 04/23/23 1608    Specimen: Blood Updated: 04/23/23 1824     HS Troponin T 25 ng/L     Narrative:      High Sensitive Troponin T Reference Range:  <10.0 ng/L- Negative Female for AMI  <15.0 ng/L- Negative Male for AMI  >=10 - Abnormal Female indicating possible myocardial injury.  >=15 - Abnormal Male indicating possible myocardial injury.   Clinicians would have to utilize clinical acumen, EKG, Troponin, and serial changes to determine if it is an Acute  Myocardial Infarction or myocardial injury due to an underlying chronic condition.         Urinalysis With Microscopic If Indicated (No Culture) - Urine, Clean Catch [[id number]]  (Abnormal) Collected: 04/23/23 1619    Specimen: Urine, Clean Catch Updated: 04/23/23 1641     Color, UA Yellow     Appearance, UA Clear     pH, UA 5.5     Specific Gravity, UA 1.020     Glucose, UA Negative     Ketones, UA Trace     Bilirubin, UA Negative     Blood, UA Negative     Protein, UA Trace     Leuk Esterase, UA Moderate (2+)     Nitrite, UA Positive     Urobilinogen, UA 0.2 E.U./dL    Urine Drug Screen - Urine, Clean Catch [[id number]]  (Abnormal) Collected: 04/23/23 1619    Specimen: Urine, Clean Catch Updated: 04/23/23 1656     Amphet/Methamphet, Screen Negative     Barbiturates Screen, Urine Negative     Benzodiazepine Screen, Urine Positive     Cocaine Screen, Urine Negative     Opiate Screen Negative     THC, Screen, Urine Negative     Methadone Screen, Urine Negative     Oxycodone Screen, Urine Negative    Narrative:      Negative Thresholds Per Drugs Screened:    Amphetamines                 500 ng/ml  Barbiturates                 200 ng/ml  Benzodiazepines              100 ng/ml  Cocaine                      300 ng/ml  Methadone                    300 ng/ml  Opiates                      300 ng/ml  Oxycodone                    100 ng/ml  THC                           50 ng/ml    The Normal Value for all drugs tested is negative. This report includes final unconfirmed screening results to be used for medical treatment purposes only. Unconfirmed results must not be used for non-medical purposes such as employment or legal testing. Clinical consideration should be applied to any drug of abuse test, particularly when unconfirmed results are used.            Urinalysis, Microscopic Only - Urine, Clean Catch [[id number]]  (Abnormal) Collected: 04/23/23 1619    Specimen: Urine, Clean Catch Updated: 04/23/23 1641     RBC, UA 0-2  /HPF      WBC, UA Too Numerous to Count /HPF      Bacteria, UA 4+ /HPF      Squamous Epithelial Cells, UA 0-2 /HPF      Hyaline Casts, UA 3-6 /LPF      Methodology Automated Microscopy    Urine Culture - Urine, Urine, Clean Catch [[id number]] Collected: 04/23/23 1619    Specimen: Urine, Clean Catch Updated: 04/23/23 1843    Protime-INR [[id number]]  (Abnormal) Collected: 04/23/23 1720    Specimen: Blood Updated: 04/23/23 1750     Protime 16.1 Seconds      INR 1.27    aPTT [[id number]]  (Normal) Collected: 04/23/23 1720    Specimen: Blood Updated: 04/23/23 1750     PTT 33.8 seconds     Lactic Acid, Plasma [[id number]]  (Normal) Collected: 04/23/23 1821    Specimen: Blood Updated: 04/23/23 1857     Lactate 1.2 mmol/L     Blood Culture - Blood, Arm, Left [[id number]] Collected: 04/23/23 1821    Specimen: Blood from Arm, Left Updated: 04/23/23 1826    Blood Culture - Blood, Arm, Left [[id number]] Collected: 04/23/23 1931    Specimen: Blood from Arm, Left Updated: 04/23/23 2036    High Sensitivity Troponin T 2Hr [[id number]] Collected: 04/23/23 1931    Specimen: Blood Updated: 04/23/23 2033          Imaging Results (Last 24 Hours)     Procedure Component Value Units Date/Time    CT Abdomen Pelvis Without Contrast [[id number]] Collected: 04/23/23 1851     Updated: 04/23/23 1851    Narrative:      CT ABDOMEN AND PELVIS WITHOUT IV CONTRAST     HISTORY: 63-year-old male with UTI. Renal failure.     TECHNIQUE: Radiation dose reduction techniques were utilized, including  automated exposure control and exposure modulation based on body size.   3 mm images were obtained through the abdomen and pelvis without the  administration of IV contrast. There are no priors for comparison.     FINDINGS: Extensive breathing artifact. There are no renal or ureteral  stones and there is no hydronephrosis bilaterally. The urinary bladder  is mildly distended and has a thickened wall. There is haziness  surrounding the prostate which measures  approximately 5 cm in diameter.  There is a large volume of formed stool in the rectosigmoid colon with  anterior mass effect on the urinary bladder. There is otherwise an  average volume of formed stool within the colon. There is a mild colonic  ileus. The appendix appears normal. There is a 3.8 cm infrarenal  abdominal aortic aneurysm which extends to the bifurcation. There is a  tiny umbilical hernia containing fat.       Impression:      1. There is likely acute prostatitis and cystitis. Please correlate  clinically. There are no renal or ureteral stones and there is no  hydronephrosis bilaterally.  2. Large volume of formed stool within the rectum with anterior mass  effect on the urinary bladder. Fecal impaction is suspected.  3. There is a 3.8 cm infrarenal abdominal aortic aneurysm.       CT Head Without Contrast [[id number]] Collected: 04/23/23 1833     Updated: 04/23/23 1833    Narrative:        Patient: ELSY LEE  Time Out: 18:33  Exam(s): CT HEAD Without Contrast     EXAM:    CT Head Without Intravenous Contrast    CLINICAL HISTORY:     Reason for exam: AMS.    TECHNIQUE:    Axial computed tomography images of the head brain without intravenous   contrast.  CTDI is 55.44 mGy and DLP is 1026.2 mGy-cm.  This CT exam was   performed according to the principle of ALARA (As Low As Reasonably   Achievable) by using one or more of the following dose reduction   techniques: automated exposure control, adjustment of the mA and or kV   according to patient size, and or use of iterative reconstruction   technique.    COMPARISON:    No relevant prior studies available.    FINDINGS:    Brain:  Unremarkable.  No hemorrhage.  No significant white matter   disease.  No edema.    Ventricles:  Unremarkable.  No ventriculomegaly.    Bones joints:  Unremarkable.  No acute fracture.    Soft tissues:  Unremarkable.    Sinuses:  Unremarkable as visualized.  No acute sinusitis.    Mastoid air cells:  Unremarkable as  visualized.  No mastoid effusion.    IMPRESSION:         Normal head brain CT.      Impression:          Electronically signed by Yoan Pardo MD on 04-23-23 at 1833    XR Chest 1 View [[id number]] Collected: 04/23/23 1721     Updated: 04/23/23 1721    Narrative:      XR CHEST 1 VW-     HISTORY: 63-year-old male with altered mental status.     FINDINGS: There is an approximately 6 cm mass in the perihilar region of  the left upper lobe. Both bairon appear prominent and may represent  lymphadenopathy. There is no evidence for CHF. Further evaluation with  chest CT is recommended.                 ECG 12 Lead Tachycardia   Preliminary Result   HEART RATE= 134  bpm   RR Interval= 448  ms   GA Interval= 146  ms   P Horizontal Axis= -7  deg   P Front Axis= 77  deg   QRSD Interval= 89  ms   QT Interval= 289  ms   QRS Axis= 73  deg   T Wave Axis= 31  deg   - ABNORMAL ECG -   Sinus tachycardia   Probable left atrial enlargement   Low voltage, extremity leads   Abnormal R-wave progression, late transition   Borderline ST elevation, lateral leads   Artifact in lead(s) II,III,aVF,V4,V5,V6   Electronically Signed By:    Date and Time of Study: 2023-04-23 17:11:18           Assessment/Plan     Active Hospital Problems    Diagnosis  POA   • **Acute cystitis without hematuria [N30.00]  Yes   • Atrial fibrillation with RVR [I48.91]  Yes   • Acute metabolic encephalopathy [G93.41]  Yes   • MARYJANE (acute kidney injury) [N17.9]  Yes   • Hypernatremia [E87.0]  Yes   • COPD (chronic obstructive pulmonary disease) [J44.9]  Yes   • Chronic respiratory failure with hypoxia and hypercapnia [J96.11, J96.12]  Yes   • Stage 3b chronic kidney disease [N18.32]  Yes      Resolved Hospital Problems   No resolved problems to display.       Mr. Tolliver is a 63 y.o. non-smoker with a history of schizophrenia, COPD, chronic respiratory failure, CKD, atrial fibrillation, seizures, and previous CVA who presents with confusion and aggression.    Acute  cystitis  -Hypotensive and tachycardic.  UA shows pyuria and bacteriuria.  Imaging confirms cystitis  -Continue IV Rocephin daily, will increase to 2gm daily  -Urine cultures pending, will de-escalate antibiotics accordingly    Acute metabolic encephalopathy  -Likely secondary to acute UTI.  Patient has been evaluated by access in the ED.  They will continue to follow.  Hopefully some improvement with IV antibiotics     MARYJANE on CKD stage IIIb  -Creat 2.60, baseline 0.70.    -IV hydration overnight  -Renal US in a.m.  -Avoid nephrotoxins  -Trend BMP    History of atrial fibrillation  -Given IV and oral Lopressor in addition to 2 doses of IV dig in ED with some improvement.  EKG noted shows P waves, likely sinus tachycardia secondary to his infection.  Will hold on cardiology consultation for now and continue home antiarrhythmics.  -Continue Eliquis    Essential hypertension  -Blood pressure currently on the soft side, likely secondary to the multiple doses of Lopressor received in the ED.  Will hold his antihypertensives for now and initiate IV fluids.    Schizophrenia  -Per chart review, previously on Zyprexa.  Will address once med rec has been completed by nursing.    History of seizures  -Resume antiepileptics.  It appears he was previously on Vimpat  -Seizure precautions    History of microcytic anemia  -Hemoglobin today 10, it appears back in October 2022, baseline was around 8.2-8.7  -Repeat CBC in a.m.    Abnormal imaging  -3.8 cm infrarenal abdominal aortic aneurysm noted on CT A/P.  There is also a 6 cm lung mass noted on CT which has increased in size from 2.5 cm noted in September.  He will need a CT of his chest      I discussed the patient's findings and my recommendations with patient, ED provider and Dr. Cuadra.    VTE Prophylaxis - Eliquis.  Code Status - Full code.       BILL Medeiros  [facility]  04/23/23  20:38 EDT

## 2023-04-23 NOTE — ED PROVIDER NOTES
MD ATTESTATION NOTE  I wore full protective equipment throughout this patient encounter including an N95 face mask, googles, gown and gloves. Hand hygiene was performed before donning protective equipment and after removal when leaving the room.    The RAHUL and I have discussed this patient's history, physical exam, and treatment plan. I have reviewed the documentation and personally had a face to face interaction with the patient. I affirm the RAHUL documentation and agree with their diagnostics, findings, treatment, plan, and disposition.    I provided a substantive portion of the care of this patient.  I personally performed the physical exam, in its entirety.  The attached note describes my personal findings.    PCP: Provider, No Known  Patient Care Team:  Provider, No Known as PCP -      Kodi Tolliver is a 63 y.o. male who presents to the ED c/o abnormal behavior.  Patient sent from [facility].  Patient altered, unable to provide history.  Per report, patient has a history of schizophrenia.  Patient has been having abnormal behavior including bleeding talking to himself and being aggressive.  Patient has pressured speech, flight of ideas, unable providing reliable history.  During our brief conversation patient mentioned killing people, cutting off his own penis, patient was extremely happy to be being evaluated by a doctor, he requested that we go out drinking together.    On exam:  General: NAD.  Head: NCAT.  ENT: nares patent, no scleral icterus  Neck: Supple, trachea midline.  Cardiac: Tachycardic  Lungs: normal effort.  Abdomen: Soft, NTTP.   Extremities: Moves all extremities well, no peripheral edema  Neuro: alert, MAEW, follows commands  Psych: Agitated, pressured speech, flight of ideas, disheveled  Skin: Warm, dry.    Medical Decision Making:  After the initial H&P, I discussed pertinent information from history and physical exam with patient/family.  Discussed differential  diagnosis.  Discussed plan for ED evaluation/work-up/treatment.  All questions answered.  Patient/family is agreeable with plan.    ED Course as of 04/23/23 2015   Sun Apr 23, 2023   1608 Patient presents from nursing home with reports of altered mental status, aggression.  Patient does have a history of schizophrenia.  We will have [facility] evaluate the patient. [EE]   1630 I spoke with the nursing home nurse who is caring for Mr. Tolliver.  She reports that he was sent in for aggression and altered mental status.  Of note they started the patient on Levaquin for suspected pneumonia. [EE]   1721 EKG independently viewed and contemporaneously interpreted by ED physician. Time: 1711.  Rate 134.  Interpretation: Sinus tachycardia, normal axis, left atrial enlargement, delayed R wave progression, no acute ST changes.  Significant artifact present. [JG]   1729 Bacteria, UA(!): 4+ [EE]   1730 Creatinine(!): 2.60  This was 0.8 on 11/2/22 [EE]   1734 Patient with UTI, acute renal failure.  Will plan for medical admission. [EE]   1739 Love from [facility] has seen and evaluated the patient.  The patient is not terribly cooperative at this time.  He has multiple reasons for medical admission.  [facility] will follow him during his admission. [EE]   1832 BP: 90/72 [EE]   1852 Patient's heart rate still in the 160s.  Blood pressure in the 90s systolically.  We will hold off on any additional Lopressor.  IV digoxin ordered. [EE]   1942 I discussed case with Sam Killian, nurse practitioner with [facility].  She agrees to admit to Dr. Cuadra. [EE]   1942 Patient's heart rate in the 130s still blood pressure 95/69.  Additional digoxin ordered. [EE]   1948 I discussed case with Dr. Jaime, radiology.  He agrees with treatment for A-fib thus far.  He will consult on the patient. [EE]   1955 Recheck of patient.  O2 sats 96%.  Heart rate in the 180s still.  Patient told plan for admission. [EE]      ED Course User Index  [EE] Marcel Gaines PA  [JG]  Pito Helm MD       Diagnosis  Final diagnoses:   Acute UTI   MARYJANE (acute kidney injury)   Atrial fibrillation with RVR   Altered mental status, unspecified altered mental status type   Full code status       Critical care:  Total critical care time of 45 minutes is exclusive of any other billable procedures and includes time spent with direct patient care and observation, retrospective chart review, management of acute condition, and consultation with other physicians.       Pito Helm MD  04/23/23 2016

## 2023-04-23 NOTE — CONSULTS
[facility] Consult, regarding psychosis.  Patient is evaluated in ED room 6.  Case discussed with ED physician.  UA is abnormal with numerous white blood cells and 4+ bacteria.  UDS positive for benzodiazepines.  BAL was normal.  He is actively having hallucinations while in the ED.  He is unable to provide reliable history due to altered mental status.    Most of this history is obtained from ERIC Ayers who works at [facility].  She states that his symptoms started about 3 days ago.  He has been combative with staff, walking around without his clothes on, and refusing medications.  Nurse from the facility states that he had his walker laying on top of him on the bed and he tried to throw it at her.  She states that he is just not acting like he normally does.    Pt is a 63-year-old single male who lives at the [facility].  His main support person is his sister.  No children.  Pt is disoriented, with flight of ideas and pressured speech.  He is difficult to understand.  Mental Health Hx: Per chart history of schizophrenia but usually under control with medication  Meds: Zyprexa and valproic acid  Family Psych Hx: Unable to obtain  Pt denies SI or HI.     Substance Use Hx: Unable to obtain    Plan: Patient will be admitted medically.  Symptoms could be due to to infection, further medical work-up is needed along with [facility] follow-up.  ED physician is in agreement with plan.  [facility] to follow and determine if we need to consult the psychiatrist.

## 2023-04-24 ENCOUNTER — APPOINTMENT (OUTPATIENT)
Dept: CT IMAGING | Facility: HOSPITAL | Age: 63
End: 2023-04-24
Payer: MEDICARE

## 2023-04-24 LAB
ALBUMIN SERPL-MCNC: 3.7 G/DL (ref 3.5–5.2)
ANION GAP SERPL CALCULATED.3IONS-SCNC: 9 MMOL/L (ref 5–15)
BACTERIA SPEC AEROBE CULT: NO GROWTH
BUN SERPL-MCNC: 48 MG/DL (ref 8–23)
BUN/CREAT SERPL: 25.5 (ref 7–25)
CALCIUM SPEC-SCNC: 8.8 MG/DL (ref 8.6–10.5)
CHLORIDE SERPL-SCNC: 108 MMOL/L (ref 98–107)
CO2 SERPL-SCNC: 30 MMOL/L (ref 22–29)
CREAT SERPL-MCNC: 1.88 MG/DL (ref 0.76–1.27)
DEPRECATED RDW RBC AUTO: 43.2 FL (ref 37–54)
EGFRCR SERPLBLD CKD-EPI 2021: 39.4 ML/MIN/1.73
ERYTHROCYTE [DISTWIDTH] IN BLOOD BY AUTOMATED COUNT: 14.3 % (ref 12.3–15.4)
FERRITIN SERPL-MCNC: 86.6 NG/ML (ref 30–400)
FOLATE SERPL-MCNC: >20 NG/ML (ref 4.78–24.2)
GLUCOSE BLDC GLUCOMTR-MCNC: 90 MG/DL (ref 70–130)
GLUCOSE SERPL-MCNC: 92 MG/DL (ref 65–99)
HCT VFR BLD AUTO: 29.4 % (ref 37.5–51)
HGB BLD-MCNC: 9.2 G/DL (ref 13–17.7)
IRON 24H UR-MRATE: 93 MCG/DL (ref 59–158)
IRON SATN MFR SERPL: 29 % (ref 20–50)
MCH RBC QN AUTO: 25.6 PG (ref 26.6–33)
MCHC RBC AUTO-ENTMCNC: 31.3 G/DL (ref 31.5–35.7)
MCV RBC AUTO: 81.7 FL (ref 79–97)
PHOSPHATE SERPL-MCNC: 4.3 MG/DL (ref 2.5–4.5)
PLATELET # BLD AUTO: 267 10*3/MM3 (ref 140–450)
PMV BLD AUTO: 9.3 FL (ref 6–12)
POTASSIUM SERPL-SCNC: 5.1 MMOL/L (ref 3.5–5.2)
QT INTERVAL: 259 MS
QT INTERVAL: 289 MS
RBC # BLD AUTO: 3.6 10*6/MM3 (ref 4.14–5.8)
SODIUM SERPL-SCNC: 147 MMOL/L (ref 136–145)
TIBC SERPL-MCNC: 323 MCG/DL (ref 298–536)
TRANSFERRIN SERPL-MCNC: 217 MG/DL (ref 200–360)
VIT B12 BLD-MCNC: 763 PG/ML (ref 211–946)
WBC NRBC COR # BLD: 10.24 10*3/MM3 (ref 3.4–10.8)

## 2023-04-24 PROCEDURE — 82607 VITAMIN B-12: CPT | Performed by: NURSE PRACTITIONER

## 2023-04-24 PROCEDURE — 99222 1ST HOSP IP/OBS MODERATE 55: CPT | Performed by: INTERNAL MEDICINE

## 2023-04-24 PROCEDURE — 25010000002 CEFTRIAXONE PER 250 MG: Performed by: NURSE PRACTITIONER

## 2023-04-24 PROCEDURE — 82962 GLUCOSE BLOOD TEST: CPT

## 2023-04-24 PROCEDURE — 85027 COMPLETE CBC AUTOMATED: CPT | Performed by: NURSE PRACTITIONER

## 2023-04-24 PROCEDURE — 80069 RENAL FUNCTION PANEL: CPT | Performed by: NURSE PRACTITIONER

## 2023-04-24 PROCEDURE — 92610 EVALUATE SWALLOWING FUNCTION: CPT | Performed by: SPEECH-LANGUAGE PATHOLOGIST

## 2023-04-24 PROCEDURE — 25010000002 HALOPERIDOL LACTATE PER 5 MG: Performed by: NURSE PRACTITIONER

## 2023-04-24 PROCEDURE — 83540 ASSAY OF IRON: CPT | Performed by: NURSE PRACTITIONER

## 2023-04-24 PROCEDURE — 82728 ASSAY OF FERRITIN: CPT | Performed by: NURSE PRACTITIONER

## 2023-04-24 PROCEDURE — 82746 ASSAY OF FOLIC ACID SERUM: CPT | Performed by: NURSE PRACTITIONER

## 2023-04-24 PROCEDURE — 84466 ASSAY OF TRANSFERRIN: CPT | Performed by: NURSE PRACTITIONER

## 2023-04-24 PROCEDURE — 71250 CT THORAX DX C-: CPT

## 2023-04-24 RX ORDER — OLANZAPINE 10 MG/1
10 TABLET ORAL 2 TIMES DAILY
Status: DISCONTINUED | OUTPATIENT
Start: 2023-04-24 | End: 2023-05-04 | Stop reason: HOSPADM

## 2023-04-24 RX ORDER — LEVETIRACETAM 500 MG/1
500 TABLET ORAL 2 TIMES DAILY
Status: DISCONTINUED | OUTPATIENT
Start: 2023-04-24 | End: 2023-05-04 | Stop reason: HOSPADM

## 2023-04-24 RX ORDER — METOPROLOL TARTRATE 50 MG/1
50 TABLET, FILM COATED ORAL DAILY
Status: DISCONTINUED | OUTPATIENT
Start: 2023-04-24 | End: 2023-05-04 | Stop reason: HOSPADM

## 2023-04-24 RX ORDER — SODIUM CHLORIDE 450 MG/100ML
100 INJECTION, SOLUTION INTRAVENOUS CONTINUOUS
Status: DISCONTINUED | OUTPATIENT
Start: 2023-04-24 | End: 2023-04-25

## 2023-04-24 RX ORDER — VALPROIC ACID 250 MG/5ML
250 SOLUTION ORAL DAILY
Status: DISCONTINUED | OUTPATIENT
Start: 2023-04-24 | End: 2023-04-25

## 2023-04-24 RX ORDER — HALOPERIDOL 5 MG/ML
2 INJECTION INTRAMUSCULAR ONCE
Status: COMPLETED | OUTPATIENT
Start: 2023-04-24 | End: 2023-04-24

## 2023-04-24 RX ORDER — HALOPERIDOL 5 MG/ML
1 INJECTION INTRAMUSCULAR ONCE
Status: COMPLETED | OUTPATIENT
Start: 2023-04-24 | End: 2023-04-24

## 2023-04-24 RX ORDER — HALOPERIDOL 5 MG/ML
1 INJECTION INTRAMUSCULAR ONCE
Status: DISCONTINUED | OUTPATIENT
Start: 2023-04-24 | End: 2023-04-24

## 2023-04-24 RX ORDER — PANTOPRAZOLE SODIUM 40 MG/1
40 TABLET, DELAYED RELEASE ORAL
Status: DISCONTINUED | OUTPATIENT
Start: 2023-04-24 | End: 2023-05-04 | Stop reason: HOSPADM

## 2023-04-24 RX ORDER — ATORVASTATIN CALCIUM 20 MG/1
20 TABLET, FILM COATED ORAL DAILY
Status: DISCONTINUED | OUTPATIENT
Start: 2023-04-24 | End: 2023-05-04 | Stop reason: HOSPADM

## 2023-04-24 RX ORDER — DOCUSATE SODIUM 100 MG/1
100 CAPSULE, LIQUID FILLED ORAL 2 TIMES DAILY
Status: DISCONTINUED | OUTPATIENT
Start: 2023-04-24 | End: 2023-05-04 | Stop reason: HOSPADM

## 2023-04-24 RX ORDER — OLANZAPINE 10 MG/1
5 INJECTION, POWDER, LYOPHILIZED, FOR SOLUTION INTRAMUSCULAR ONCE
Status: COMPLETED | OUTPATIENT
Start: 2023-04-24 | End: 2023-04-24

## 2023-04-24 RX ORDER — OLANZAPINE 10 MG/1
10 INJECTION, POWDER, LYOPHILIZED, FOR SOLUTION INTRAMUSCULAR EVERY 8 HOURS PRN
Status: DISCONTINUED | OUTPATIENT
Start: 2023-04-24 | End: 2023-05-04 | Stop reason: HOSPADM

## 2023-04-24 RX ORDER — HALOPERIDOL 5 MG/ML
2 INJECTION INTRAMUSCULAR ONCE AS NEEDED
Status: COMPLETED | OUTPATIENT
Start: 2023-04-24 | End: 2023-04-24

## 2023-04-24 RX ADMIN — HALOPERIDOL LACTATE 2 MG: 5 INJECTION, SOLUTION INTRAMUSCULAR at 17:07

## 2023-04-24 RX ADMIN — SODIUM CHLORIDE 5 MG/HR: 900 INJECTION, SOLUTION INTRAVENOUS at 09:05

## 2023-04-24 RX ADMIN — SODIUM CHLORIDE 125 ML/HR: 9 INJECTION, SOLUTION INTRAVENOUS at 06:00

## 2023-04-24 RX ADMIN — APIXABAN 5 MG: 5 TABLET, FILM COATED ORAL at 08:42

## 2023-04-24 RX ADMIN — SODIUM CHLORIDE 100 ML/HR: 4.5 INJECTION, SOLUTION INTRAVENOUS at 23:59

## 2023-04-24 RX ADMIN — SODIUM CHLORIDE 100 ML/HR: 4.5 INJECTION, SOLUTION INTRAVENOUS at 14:42

## 2023-04-24 RX ADMIN — METOPROLOL TARTRATE 50 MG: 50 TABLET, FILM COATED ORAL at 08:42

## 2023-04-24 RX ADMIN — LEVETIRACETAM 500 MG: 500 TABLET, FILM COATED ORAL at 08:42

## 2023-04-24 RX ADMIN — HALOPERIDOL LACTATE 2 MG: 5 INJECTION, SOLUTION INTRAMUSCULAR at 05:32

## 2023-04-24 RX ADMIN — CEFTRIAXONE SODIUM 2 G: 2 INJECTION, POWDER, FOR SOLUTION INTRAMUSCULAR; INTRAVENOUS at 23:59

## 2023-04-24 RX ADMIN — HALOPERIDOL LACTATE 1 MG: 5 INJECTION, SOLUTION INTRAMUSCULAR at 02:28

## 2023-04-24 RX ADMIN — OLANZAPINE 5 MG: 10 INJECTION, POWDER, FOR SOLUTION INTRAMUSCULAR at 01:50

## 2023-04-24 RX ADMIN — OLANZAPINE 10 MG: 10 INJECTION, POWDER, FOR SOLUTION INTRAMUSCULAR at 21:46

## 2023-04-24 RX ADMIN — OLANZAPINE 10 MG: 10 TABLET ORAL at 08:42

## 2023-04-24 RX ADMIN — ATORVASTATIN CALCIUM 20 MG: 20 TABLET, FILM COATED ORAL at 08:42

## 2023-04-24 RX ADMIN — VALPROIC ACID 250 MG: 250 SOLUTION ORAL at 11:52

## 2023-04-24 RX ADMIN — SODIUM CHLORIDE 5 MG/HR: 900 INJECTION, SOLUTION INTRAVENOUS at 00:42

## 2023-04-24 NOTE — NURSING NOTE
Patient seen by [facility] d/t psychosis.     Patient restless in bed, agitated and cussing. Patient currently in bilateral wrist restraints. The patient's RN reported patient has been spitting, hitting, and cussing. The patient received Zyprexa 5mg IM @ 0150 and Haldol 1mg IM @ 0228, Haldol 2mg IM @ 0532, and Zyprexa 10mg PO @ 0842. The patient's RN reported the medications were minimally effective, Upon entering room the patient patient began cussing immediately. Patient's voice very hoarse and strained. Psychiatrist to see.  Access will follow.

## 2023-04-24 NOTE — ED NOTES
.Nursing report ED to floor  Kodi Tolliver  63 y.o.  male    HPI :   Chief Complaint   Patient presents with    Psychiatric Evaluation       Admitting doctor:   Rogers Cuadra MD    Admitting diagnosis:   The primary encounter diagnosis was Acute UTI. Diagnoses of MARYJANE (acute kidney injury), Atrial fibrillation with RVR, Altered mental status, unspecified altered mental status type, and Full code status were also pertinent to this visit.    Code status:   Current Code Status       Date Active Code Status Order ID Comments User Context       Not on file            Allergies:   Patient has no known allergies.    Isolation:   No active isolations    Intake and Output    Intake/Output Summary (Last 24 hours) at 4/23/2023 2002  Last data filed at 4/23/2023 1705  Gross per 24 hour   Intake --   Output 800 ml   Net -800 ml       Weight:   There were no vitals filed for this visit.    Most recent vitals:   Vitals:    04/23/23 1835 04/23/23 1905 04/23/23 1936 04/23/23 1940   BP: 102/77 97/76 95/69    BP Location:       Patient Position:       Pulse: (!) 161 (!) 149 (!) 161 (!) 135   Resp:       Temp:       SpO2: 92%  93% (!) 81%       Active LDAs/IV Access:   Lines, Drains & Airways       Active LDAs       Name Placement date Placement time Site Days    Peripheral IV 04/23/23 1724 Right Antecubital 04/23/23  1724  Antecubital  less than 1                    Labs (abnormal labs have a star):   Labs Reviewed   COMPREHENSIVE METABOLIC PANEL - Abnormal; Notable for the following components:       Result Value    Glucose 111 (*)     BUN 58 (*)     Creatinine 2.60 (*)     Sodium 148 (*)     CO2 33.2 (*)     AST (SGOT) 54 (*)     eGFR 26.7 (*)     All other components within normal limits    Narrative:     GFR Normal >60  Chronic Kidney Disease <60  Kidney Failure <15     URINALYSIS W/ MICROSCOPIC IF INDICATED (NO CULTURE) - Abnormal; Notable for the following components:    Ketones, UA Trace (*)     Protein, UA Trace (*)     Leuk  Esterase, UA Moderate (2+) (*)     Nitrite, UA Positive (*)     All other components within normal limits   URINE DRUG SCREEN - Abnormal; Notable for the following components:    Benzodiazepine Screen, Urine Positive (*)     All other components within normal limits    Narrative:     Negative Thresholds Per Drugs Screened:    Amphetamines                 500 ng/ml  Barbiturates                 200 ng/ml  Benzodiazepines              100 ng/ml  Cocaine                      300 ng/ml  Methadone                    300 ng/ml  Opiates                      300 ng/ml  Oxycodone                    100 ng/ml  THC                           50 ng/ml    The Normal Value for all drugs tested is negative. This report includes final unconfirmed screening results to be used for medical treatment purposes only. Unconfirmed results must not be used for non-medical purposes such as employment or legal testing. Clinical consideration should be applied to any drug of abuse test, particularly when unconfirmed results are used.           CBC WITH AUTO DIFFERENTIAL - Abnormal; Notable for the following components:    RBC 4.08 (*)     Hemoglobin 10.2 (*)     Hematocrit 33.6 (*)     MCH 25.0 (*)     MCHC 30.4 (*)     Neutrophil % 77.4 (*)     Lymphocyte % 13.0 (*)     Neutrophils, Absolute 8.35 (*)     All other components within normal limits   PROTIME-INR - Abnormal; Notable for the following components:    Protime 16.1 (*)     INR 1.27 (*)     All other components within normal limits   URINALYSIS, MICROSCOPIC ONLY - Abnormal; Notable for the following components:    WBC, UA Too Numerous to Count (*)     Bacteria, UA 4+ (*)     All other components within normal limits   TROPONIN - Abnormal; Notable for the following components:    HS Troponin T 25 (*)     All other components within normal limits    Narrative:     High Sensitive Troponin T Reference Range:  <10.0 ng/L- Negative Female for AMI  <15.0 ng/L- Negative Male for AMI  >=10 -  Abnormal Female indicating possible myocardial injury.  >=15 - Abnormal Male indicating possible myocardial injury.   Clinicians would have to utilize clinical acumen, EKG, Troponin, and serial changes to determine if it is an Acute Myocardial Infarction or myocardial injury due to an underlying chronic condition.        APTT - Normal   LACTIC ACID, PLASMA - Normal   BLOOD CULTURE   BLOOD CULTURE   URINE CULTURE   ETHANOL   HIGH SENSITIVITIY TROPONIN T 2HR   CBC AND DIFFERENTIAL    Narrative:     The following orders were created for panel order CBC & Differential.  Procedure                               Abnormality         Status                     ---------                               -----------         ------                     CBC Auto Differential[[id number]]        Abnormal            Final result                 Please view results for these tests on the individual orders.       EKG:   ECG 12 Lead Tachycardia   Preliminary Result   HEART RATE= 134  bpm   RR Interval= 448  ms   UT Interval= 146  ms   P Horizontal Axis= -7  deg   P Front Axis= 77  deg   QRSD Interval= 89  ms   QT Interval= 289  ms   QRS Axis= 73  deg   T Wave Axis= 31  deg   - ABNORMAL ECG -   Sinus tachycardia   Probable left atrial enlargement   Low voltage, extremity leads   Abnormal R-wave progression, late transition   Borderline ST elevation, lateral leads   Artifact in lead(s) II,III,aVF,V4,V5,V6   Electronically Signed By:    Date and Time of Study: 2023-04-23 17:11:18          Meds given in ED:   Medications   digoxin (LANOXIN) injection 250 mcg (has no administration in time range)   sodium chloride 0.9 % bolus 1,000 mL (has no administration in time range)   cefTRIAXone (ROCEPHIN) 1 g in sodium chloride 0.9 % 100 mL IVPB-VTB (1 g Intravenous New Bag 4/23/23 1932)   sodium chloride 0.9 % bolus 1,000 mL (1,000 mL Intravenous New Bag 4/23/23 1802)   droperidol (INAPSINE) injection 5 mg (5 mg Intravenous Given 4/23/23 3148)    metoprolol tartrate (LOPRESSOR) tablet 25 mg (25 mg Oral Given 4/23/23 1800)   metoprolol tartrate (LOPRESSOR) injection 5 mg (5 mg Intravenous Given 4/23/23 1758)   digoxin (LANOXIN) injection 250 mcg (250 mcg Intravenous Given 4/23/23 1905)       Imaging results:  CT Abdomen Pelvis Without Contrast    Result Date: 4/23/2023  1. There is likely acute prostatitis and cystitis. Please correlate clinically. There are no renal or ureteral stones and there is no hydronephrosis bilaterally. 2. Large volume of formed stool within the rectum with anterior mass effect on the urinary bladder. Fecal impaction is suspected. 3. There is a 3.8 cm infrarenal abdominal aortic aneurysm.      CT Head Without Contrast    Result Date: 4/23/2023  Electronically signed by Yoan Pardo MD on 04-23-23 at 1833     Ambulatory status:   - bedrest    Social issues:   Social History     Socioeconomic History    Marital status: Unknown       NIH Stroke Scale:         Helen Glasgow RN  04/23/23 20:02 EDT

## 2023-04-24 NOTE — PROGRESS NOTES
Of kin Patient was already seen by my partner, please refer to his note for initial assessment  I tried to discuss with the patient the finding on the CAT scan but he is in no shape to be able to have such a conversation  The CAT scan is concerning for malignancy and patient needs a bronchoscopy  I could not understand anything that the patient was trying to say but I could tell he was irritated and did not want to carry on with the conversation  The finding on the CAT scan are major concern but they are not an emergency to do any immediate intervention and we will wait until the patient psych issues are under better control where I can talk to him and discussed the findings  We will try again tomorrow if the patient remains confused or completely irrational, we will try to discuss it with the next of kin

## 2023-04-24 NOTE — PROGRESS NOTES
"    DAILY PROGRESS NOTE  [facility]    Patient Identification:  Name: Kodi Tolliver  Age: 63 y.o.  Sex: male  :  1960  MRN: [id number]         Primary Care Physician: Provider, No Known    Subjective:  Interval History: Currently alert and oriented to person and place.  He is currently calm and conversational and was pleasant.  After discussion with RN just 30 minutes ago he was a bit agitated and while one arm came out of the restraint and had to be placed.  We discussed further restraint management.  Patient is denying any chest pain.  Is very difficult to interpret his speech secondary to the severe notes of his hoarseness but it is fluent    Objective: Nontoxic in appearance.  No family present though aide at bedside -RN indicated a couple family members had just left    Scheduled Meds:apixaban, 5 mg, Oral, BID  atorvastatin, 20 mg, Oral, Daily  cefTRIAXone, 2 g, Intravenous, Q24H  docusate sodium, 100 mg, Oral, BID  levETIRAcetam, 500 mg, Oral, BID  metoprolol tartrate, 50 mg, Oral, Daily  OLANZapine, 10 mg, Oral, BID  pantoprazole, 40 mg, Oral, Q AM  senna-docusate sodium, 2 tablet, Oral, BID  Valproic Acid, 250 mg, Oral, Daily      Continuous Infusions:dilTIAZem, 5-10 mg/hr, Last Rate: 5 mg/hr (23 0905)  sodium chloride, 100 mL/hr        Vital signs in last 24 hours:  Temp:  [97.6 °F (36.4 °C)-99.4 °F (37.4 °C)] 99.4 °F (37.4 °C)  Heart Rate:  [] 75  Resp:  [18-20] 18  BP: ()/() 117/82    Intake/Output:    Intake/Output Summary (Last 24 hours) at 2023 1240  Last data filed at 2023 0625  Gross per 24 hour   Intake 2139 ml   Output 1200 ml   Net 939 ml       Exam:  /82 (BP Location: Right arm, Patient Position: Lying)   Pulse 75   Temp 99.4 °F (37.4 °C) (Tympanic)   Resp 18   Ht 180.3 cm (71\")   Wt 72.3 kg (159 lb 6.4 oz)   SpO2 91%   BMI 22.23 kg/m²     General Appearance:    Alert, cooperative at this time, AO x2                          Head: "    Normocephalic, without obvious abnormality, atraumatic                           Eyes:    PERRL, conjunctivae/corneas clear, EOM's intact, both eyes                         Throat: Very poor dentition; oral mucosa pink and moist                           Neck:   Supple, no JVD                         Lungs:    Clear to auscultation bilaterally, respirations unlabored                 Chest Wall:    No tenderness or deformity                          Heart:    Regular rate and rhythm, S1 and S2 normal                  Abdomen:     Soft, nontender, bowel sounds active                 Extremities: Moving all, no cyanosis or edema                        Pulses:   Pulses palpable in all extremities                            Skin:   Skin is warm and dry                  Neurologic:   CNII-XII intact, no obvious focal deficits noted     Data Review:  Labs in chart were reviewed.    Assessment:  Active Hospital Problems    Diagnosis  POA   • **Acute prostatitis/cystitis without hematuria [N30.00]  Yes   • Paroxysmal atrial fibrillation [I48.0]  Yes   • Acute metabolic encephalopathy [G93.41]  Yes   • MARYJANE (acute kidney injury) [N17.9]  Yes   • COPD (chronic obstructive pulmonary disease) [J44.9]  Yes   • Chronic respiratory failure with hypoxia and hypercapnia [J96.11, J96.12]  Yes   • Stage 3b chronic kidney disease [N18.32]  Yes   • Schizophrenia [F20.9]  Yes   • Essential hypertension [I10]  Yes   • Mass of left lung [R91.8]  Yes      Resolved Hospital Problems   No resolved problems to display.       Plan:    Appreciate psychiatry assistance with behavior issues given past history of schizophrenia/seizure   -Remains in soft restraints and this was discussed with managing RN   -Has required multiple doses of Zyprexa and Haldol overnight -currently more relaxed and conversational and nonaggressive at this point in time though behavior issues still labile after discussion with RN   -Metabolic encephalopathy could also be  influenced by UTI on Rocephin    2-1 AST to ALT ratio but no reported alcohol use -recheck CMP in a.m.    Given his hoarseness as well as lung mass and current confusion my concern would be for underlying lung cancer with questionable metastasis to brain.  Pulmonary is consulted and following and they are waiting CT results before we consider further biopsy.  CT of the head was negative though MRI could be more revealing pending the CT results    Hypernatremia -IV fluids adjusted -this will also affect mentation    ARF resolving with baseline CKD 3B -creatinine improving-IVF adjusted    A-fib with RVR    -AC with Eliquis -cards consulting and assisting with rate control -status post IV digoxin 500 mcg, IV diltiazem    HTN stable    HLD on statin -watch LFTs        All consultant input greatly appreciated on this case.  Patient still medically requiring restraints given encephalopathy    Yinka Mccoy MD  4/24/2023  12:40 EDT

## 2023-04-24 NOTE — CONSULTS
[facility]    Reason for consult: lung mass    HPI:  Mr. Tolliver is a 62yo male with schizophrenia.  He was admitted here yesterday with prostatits and cystitis.  He has had some afib whle here as well.  He is unable to provide usable history.  I think he does tell me his name but largely he is unintelligable except for the profanity.  There is little in the way of records either in our computer or care everywhere.    Past Medical History:   Diagnosis Date   • Anxiety    • Aphonia    • Atrial fibrillation    • CHF (congestive heart failure)    • COPD (chronic obstructive pulmonary disease)    • Depression    • Essential hypertension    • Schizophrenia      Social History     Socioeconomic History   • Marital status: Single   Tobacco Use   • Smoking status: Unknown     History reviewed. No pertinent family history.  MEDS: Reviewed as per chart  ALL: nkda  ROS: UTO  History reviewed. No pertinent family history.    Vital Sign Min/Max for last 24 hours  Temp  Min: 97.6 °F (36.4 °C)  Max: 99.4 °F (37.4 °C)   BP  Min: 68/48  Max: 143/100   Pulse  Min: 93  Max: 199   Resp  Min: 18  Max: 20   SpO2  Min: 70 %  Max: 97 %   Flow (L/min)  Min: 2  Max: 4   Weight  Min: 68 kg (150 lb)  Max: 72.3 kg (159 lb 6.4 oz)     GEN:   appears ill, thin, Alert, easily agitated, largely incomprehensible  HEENT: PERRL, EOMI, no icterus, mmm, no JVD, trachea midline, neck supple  CHEST: CTA bilat, no wheezes, no crackles, no use of accessory muscles  CV: tachy, irrg, no m/g/r  ABD: soft, nt, nd +bs, no hepatosplenomegaly  EXT: no c/c/e  SKIN: no rashes, no xanthomas, nl turgor, warm, dry  LYMPH: no palpable cervical or supraclavicular lymphadenopathy  NEURO: CN 2-12 intact and symmetric bilaterally --somewhat limited exam by cooperation  PSYCH: labile affect, impaired orientation, doesn't cooperate judgment,  Doesn't answer mood  MSK: no kyphoscoliosis, 5/5 strength ue and le bilaterally    Labs: 4/24; reviewed:  Wbc 10  hgb  9.2  plts 267  Glucose 92  Bun 48  Cr 1.88 (was 2.6)  Na 147  Bicarb 30  lactte 1.2    4/23: CXR: left large opacity 6cm per rads; reviewed actual images    A/P:  1. Left lung mass -- agree with ct evaluation, this has been ordered, will await ct scan results  2. MARYJANE -- Improving  3. Metabolic acidosis - he doesn't appear to have acute hypercapnia anyways, consider blood gas should he become somonolent  4. Schizophrenia -- currently uncooperative and agitated, defer to [facility]/psych  5. Anemia

## 2023-04-24 NOTE — THERAPY EVALUATION
Acute Care - Speech Language Pathology   Swallow Initial Evaluation [facility]     Patient Name: Kodi Tolliver  : 1960  MRN: [id number]  Today's Date: 2023               Admit Date: 2023    Visit Dx:     ICD-10-CM ICD-9-CM   1. Acute UTI  N39.0 599.0   2. MARYJANE (acute kidney injury)  N17.9 584.9   3. Atrial fibrillation with RVR  I48.91 427.31   4. Altered mental status, unspecified altered mental status type  R41.82 780.97   5. Full code status  Z78.9 V49.89     Patient Active Problem List   Diagnosis   • Paroxysmal atrial fibrillation   • Acute prostatitis/cystitis without hematuria   • Acute metabolic encephalopathy   • MARYJANE (acute kidney injury)   • COPD (chronic obstructive pulmonary disease)   • Chronic respiratory failure with hypoxia and hypercapnia   • Stage 3b chronic kidney disease   • Schizophrenia   • Essential hypertension   • Mass of left lung     Past Medical History:   Diagnosis Date   • Anxiety    • Aphonia    • Atrial fibrillation    • CHF (congestive heart failure)    • COPD (chronic obstructive pulmonary disease)    • Depression    • Essential hypertension    • Schizophrenia      History reviewed. No pertinent surgical history.    SLP Recommendation and Plan  SLP Swallowing Diagnosis: swallow WFL/no suspected pharyngeal impairment (23)  SLP Diet Recommendation: soft to chew textures, chopped, thin liquids (23)  Recommended Precautions and Strategies: upright posture during/after eating, small bites of food and sips of liquid (23)  SLP Rec. for Method of Medication Administration: meds whole, as tolerated (23)     Monitor for Signs of Aspiration: yes, notify SLP if any concerns (23)     Swallow Criteria for Skilled Therapeutic Interventions Met: demonstrates skilled criteria (23)  Anticipated Discharge Disposition (SLP): long term acute care facility (23)  Rehab Potential/Prognosis, Swallowing: good, to  achieve stated therapy goals (04/24/23 0900)  Therapy Frequency (Swallow): PRN (04/24/23 0900)  Predicted Duration Therapy Intervention (Days): until discharge (04/24/23 0900)                                        Plan of Care Reviewed With: patient  Outcome Evaluation: Clinical swallow evaluation completed recommend mechanical soft chopped and thin liquids. Meds whole with puree or thins. Small bites and sips.      SWALLOW EVALUATION (last 72 hours)     SLP Adult Swallow Evaluation     Row Name 04/24/23 0900                   Rehab Evaluation    Document Type evaluation  -KA        Subjective Information no complaints  -KA        Patient Observations alert;cooperative  -KA        Patient Effort good  -KA        Symptoms Noted During/After Treatment none  -KA           General Information    Patient Profile Reviewed yes  -KA        Pertinent History Of Current Problem prostatis and cystitis, lung mass. Hx of schizophrenia and CVA.  -KA        Current Method of Nutrition NPO  -KA        Precautions/Limitations, Vision WFL;for purposes of eval  -KA        Precautions/Limitations, Hearing WFL;for purposes of eval  -KA        Prior Level of Function-Communication motor speech impairment  -KA        Prior Level of Function-Swallowing unknown  -KA        Plans/Goals Discussed with patient  -KA        Barriers to Rehab medically complex  -KA        Patient's Goals for Discharge return to PO diet  -KA           Pain    Additional Documentation Pain Scale: FACES Pre/Post-Treatment (Group)  -KA           Pain Scale: FACES Pre/Post-Treatment    Pain: FACES Scale, Pretreatment 0-->no hurt  -KA        Posttreatment Pain Rating 0-->no hurt  -KA           Oral Motor Structure and Function    Dentition Assessment missing teeth  -KA        Secretion Management WNL/WFL  -KA        Mucosal Quality moist, healthy  -KA        Volitional Swallow WFL  -KA        Volitional Cough weak  -KA           Oral Musculature and Cranial Nerve  Assessment    Oral Motor General Assessment generalized oral motor weakness;oral labial or buccal impairment  -KA        Oral Labial or Buccal Impairment, Detail, Cranial Nerve VII (Facial): left labial droop  -KA        Vocal Impairment, Detail. Cranial Nerve X (Vagus) vocal quality abnormality (see comments);other (see comments)  weak, raspy and hoarse vocal quality  -KA           General Eating/Swallowing Observations    Eating/Swallowing Skills fed by SLP  -KA        Positioning During Eating upright in bed  -KA        Utensils Used spoon;cup;straw  -KA        Consistencies Trialed soft to chew textures;chopped;mixed consistency;pureed;thin liquids;nectar/syrup-thick liquids  -KA           Clinical Swallow Eval    Clinical Swallow Evaluation Summary Patient presents with hoarse vocal quality and speech barely intelligible. Inconsistently follows commands. Patient demonstrated no overt s/s of pen/asp with ice chips, NTL via spoon and cup, thins via spoon, cup and straw, puree and mechanical soft mixed consistency, and meds with puree. Laryngeal elevation felt adequate upon neck palpation. Mastication WNL for soft solids even with limited dentition.  -KA           SLP Evaluation Clinical Impression    SLP Swallowing Diagnosis swallow WFL/no suspected pharyngeal impairment  -KA        Functional Impact risk of aspiration/pneumonia  -KA        Rehab Potential/Prognosis, Swallowing good, to achieve stated therapy goals  -KA        Swallow Criteria for Skilled Therapeutic Interventions Met demonstrates skilled criteria  -KA           Recommendations    Therapy Frequency (Swallow) PRN  -KA        Predicted Duration Therapy Intervention (Days) until discharge  -KA        SLP Diet Recommendation soft to chew textures;chopped;thin liquids  -KA        Recommended Precautions and Strategies upright posture during/after eating;small bites of food and sips of liquid  -KA        Oral Care Recommendations Oral Care BID/PRN  -KA         SLP Rec. for Method of Medication Administration meds whole;as tolerated  -KA        Monitor for Signs of Aspiration yes;notify SLP if any concerns  -KA        Anticipated Discharge Disposition (SLP) long term acute care facility  -KA           Swallow Goals (SLP)    Swallow STGs diet tolerance goal selection (SLP)  -KA        Diet Tolerance Goal Selection (SLP) Patient will tolerate trials of  -KA           (STG) Patient will tolerate trials of    Consistencies Trialed (Tolerate trials) soft to chew (chopped) textures;thin liquids  -KA        Desired Outcome (Tolerate trials) without signs/symptoms of aspiration  -KA              User Key  (r) = Recorded By, (t) = Taken By, (c) = Cosigned By    Initials Name Effective Dates    Romero Guthrie MA,CCC-SLP 06/02/22 -                 EDUCATION  The patient has been educated in the following areas:   Dysphagia (Swallowing Impairment).        SLP GOALS     Row Name 04/24/23 0900             (STG) Patient will tolerate trials of    Consistencies Trialed (Tolerate trials) soft to chew (chopped) textures;thin liquids  -KA      Desired Outcome (Tolerate trials) without signs/symptoms of aspiration  -KA            User Key  (r) = Recorded By, (t) = Taken By, (c) = Cosigned By    Initials Name Provider Type    Romero Guthrie MA,CCC-SLP Speech and Language Pathologist                   Time Calculation:    Time Calculation- SLP     Row Name 04/24/23 0940             Time Calculation- SLP    SLP Start Time 0830  -KA      SLP Received On 04/24/23  -KA         Untimed Charges    SLP Eval/Re-eval  ST Eval Oral Pharyng Swallow - 28606  -KA      18231-IR Eval Oral Pharyng Swallow Minutes 60  -KA         Total Minutes    Untimed Charges Total Minutes 60  -KA       Total Minutes 60  -KA            User Key  (r) = Recorded By, (t) = Taken By, (c) = Cosigned By    Initials Name Provider Type    Romero Guthrie MA,CCC-SLP Speech and Language Pathologist                 Therapy Charges for Today     Code Description Service Date Service Provider Modifiers Qty    [id number]  ST EVAL ORAL PHARYNG SWALLOW 4 4/24/2023 Romero Gonzales MA,CCC-SLP GN 1               Romero Gonzales MA,CCC-SLP  4/24/2023

## 2023-04-24 NOTE — CASE MANAGEMENT/SOCIAL WORK
Discharge Planning Assessment  [facility]     Patient Name: Kodi Tolliver  MRN: [id number]  Today's Date: 4/24/2023    Admit Date: 4/23/2023    Plan: [place] [facility] [place] will need [place]   Discharge Needs Assessment     Row Name 04/24/23 1525       Living Environment    People in Home facility resident    Current Living Arrangements extended care facility    Primary Care Provided by self    Provides Primary Care For no one    Family Caregiver if Needed child(blane), adult;sibling(s)    Able to Return to Prior Arrangements yes       Discharge Needs Assessment    Current Outpatient/Agency/Support Group skilled nursing facility    Discharge Facility/Level of Care Needs nursing facility, intermediate               Discharge Plan     Row Name 04/24/23 1530       Plan    Plan Return to [facility] - will need EMS    Patient/Family in Agreement with Plan yes    Plan Comments CCP spoke to patient's sister over the phone; explained role, verified facesheet, and discussed dc plan. Patient was admitted from [facility]. Sister confirms plan is to return to [facility]. CCP spoke with Charisse/Hernandez who states patient is from a [facility] IC level medicaid bed hold and can return when medically stable. Patient will not need a covid test as long as he is symptom free. Patient [place] need transportation at [place] PIPPA, CSW              Continued Care and Services - Admitted Since 4/23/2023     Destination Coordination complete.    Service Provider Request Status Selected Services Address Phone Fax Patient Preferred    [facility]  [facility] [address] [facility] [id number] [phone] --              Expected Discharge Date and Time     Expected Discharge Date Expected Discharge Time    Apr 27, 2023          Demographic Summary     Row Name 04/24/23 1524       General Information    Admission Type inpatient    Arrived From home    Referral Source admission list    Reason  for Consult discharge planning    Preferred Language English       Contact Information    Permission Granted to Share Info With family/designee               Functional Status     Row Name 04/24/23 1525       Functional Status, IADL    Medications assistive equipment and person    Meal Preparation assistive equipment and person    Housekeeping assistive equipment and person    Laundry assistive equipment and person    Shopping assistive equipment and person       Mental Status    General Appearance WDL WDL               Psychosocial    No documentation.                Abuse/Neglect    No documentation.                Legal    No documentation.                Substance Abuse    No documentation.                Patient Forms    No documentation.                   SHORTY Orr

## 2023-04-24 NOTE — DISCHARGE PLACEMENT REQUEST
"Kodi Lee (63 y.o. Male)     Date of Birth   1960    Social Security Number       Address   [place] [id number]    Home Phone   [phone]    MRN   [id number]       Rastafari   None    Marital Status   Single                            Admission Date   4/23/23    Admission Type   Emergency    Admitting Provider   Rogers Cuadra MD    Attending Provider   Yinka Mccoy MD    Department, Room/Bed   [address], [address]       Discharge Date       Discharge Disposition       Discharge Destination                               Attending Provider: Yinka Mccoy MD    Allergies: No Known Allergies    Isolation: None   Infection: None   Code Status: CPR    Ht: 180.3 cm (71\")   Wt: 72.3 kg (159 lb 6.4 oz)    Admission Cmt: None   Principal Problem: Acute prostatitis/cystitis without hematuria [N30.00]                 Active Insurance as of 4/23/2023     Primary Coverage     Payor Plan Insurance Group Employer/Plan Group    MEDICARE MEDICARE A & B      Payor Plan Address Payor Plan Phone Number Payor Plan Fax Number Effective Dates    [address] [phone]  8/1/1984 - None Entered    [facility] [id number]       Subscriber Name Subscriber Birth Date Member ID       KODI LEE 1960 [id number]           Secondary Coverage     Payor Plan Insurance Group Employer/Plan Group    [id number]      Payor Plan Address Payor Plan Phone Number Payor Plan Fax Number Effective Dates    [address] 4/20/2023 - None Entered    [place] [address]       Subscriber Name Subscriber Birth Date Member ID       KODI LEE 1960 [id number]                 Emergency Contacts      (Rel.) Home Phone Work Phone Mobile Phone    YANET MAR (Sister) [phone] -- --    ISABELLA SHIPMAN (Daughter) [phone] -- --              "

## 2023-04-24 NOTE — ED NOTES
..Nursing report ED to floor  Kodi Tolliver  63 y.o.  male    HPI :   Chief Complaint   Patient presents with    Psychiatric Evaluation       Admitting doctor:   Rogers Cuadra MD    Admitting diagnosis:   The primary encounter diagnosis was Acute UTI. Diagnoses of MARYJANE (acute kidney injury), Atrial fibrillation with RVR, Altered mental status, unspecified altered mental status type, and Full code status were also pertinent to this visit.    Code status:   Current Code Status       Date Active Code Status Order ID Comments User Context       Not on file            Allergies:   Patient has no known allergies.    Isolation:   No active isolations    Intake and Output    Intake/Output Summary (Last 24 hours) at 4/23/2023 2040  Last data filed at 4/23/2023 2003  Gross per 24 hour   Intake 1100 ml   Output 800 ml   Net 300 ml       Weight:       04/23/23 2015   Weight: 68 kg (150 lb)       Most recent vitals:   Vitals:    04/23/23 2010 04/23/23 2011 04/23/23 2015 04/23/23 2037   BP: 97/85   101/85   BP Location:       Patient Position:       Pulse:    (!) 141   Resp:       Temp:       SpO2:  92%  90%   Weight:   68 kg (150 lb)        Active LDAs/IV Access:   Lines, Drains & Airways       Active LDAs       Name Placement date Placement time Site Days    Peripheral IV 04/23/23 1724 Right Antecubital 04/23/23  1724  Antecubital  less than 1                    Labs (abnormal labs have a star):   Labs Reviewed   COMPREHENSIVE METABOLIC PANEL - Abnormal; Notable for the following components:       Result Value    Glucose 111 (*)     BUN 58 (*)     Creatinine 2.60 (*)     Sodium 148 (*)     CO2 33.2 (*)     AST (SGOT) 54 (*)     eGFR 26.7 (*)     All other components within normal limits    Narrative:     GFR Normal >60  Chronic Kidney Disease <60  Kidney Failure <15     URINALYSIS W/ MICROSCOPIC IF INDICATED (NO CULTURE) - Abnormal; Notable for the following components:    Ketones, UA Trace (*)     Protein, UA Trace (*)     Leuk  Esterase, UA Moderate (2+) (*)     Nitrite, UA Positive (*)     All other components within normal limits   URINE DRUG SCREEN - Abnormal; Notable for the following components:    Benzodiazepine Screen, Urine Positive (*)     All other components within normal limits    Narrative:     Negative Thresholds Per Drugs Screened:    Amphetamines                 500 ng/ml  Barbiturates                 200 ng/ml  Benzodiazepines              100 ng/ml  Cocaine                      300 ng/ml  Methadone                    300 ng/ml  Opiates                      300 ng/ml  Oxycodone                    100 ng/ml  THC                           50 ng/ml    The Normal Value for all drugs tested is negative. This report includes final unconfirmed screening results to be used for medical treatment purposes only. Unconfirmed results must not be used for non-medical purposes such as employment or legal testing. Clinical consideration should be applied to any drug of abuse test, particularly when unconfirmed results are used.           CBC WITH AUTO DIFFERENTIAL - Abnormal; Notable for the following components:    RBC 4.08 (*)     Hemoglobin 10.2 (*)     Hematocrit 33.6 (*)     MCH 25.0 (*)     MCHC 30.4 (*)     Neutrophil % 77.4 (*)     Lymphocyte % 13.0 (*)     Neutrophils, Absolute 8.35 (*)     All other components within normal limits   PROTIME-INR - Abnormal; Notable for the following components:    Protime 16.1 (*)     INR 1.27 (*)     All other components within normal limits   URINALYSIS, MICROSCOPIC ONLY - Abnormal; Notable for the following components:    WBC, UA Too Numerous to Count (*)     Bacteria, UA 4+ (*)     All other components within normal limits   TROPONIN - Abnormal; Notable for the following components:    HS Troponin T 25 (*)     All other components within normal limits    Narrative:     High Sensitive Troponin T Reference Range:  <10.0 ng/L- Negative Female for AMI  <15.0 ng/L- Negative Male for AMI  >=10 -  Abnormal Female indicating possible myocardial injury.  >=15 - Abnormal Male indicating possible myocardial injury.   Clinicians would have to utilize clinical acumen, EKG, Troponin, and serial changes to determine if it is an Acute Myocardial Infarction or myocardial injury due to an underlying chronic condition.        APTT - Normal   LACTIC ACID, PLASMA - Normal   BLOOD CULTURE   BLOOD CULTURE   URINE CULTURE   ETHANOL   HIGH SENSITIVITIY TROPONIN T 2HR   CBC AND DIFFERENTIAL    Narrative:     The following orders were created for panel order CBC & Differential.  Procedure                               Abnormality         Status                     ---------                               -----------         ------                     CBC Auto Differential[[id number]]        Abnormal            Final result                 Please view results for these tests on the individual orders.       EKG:   ECG 12 Lead Tachycardia   Preliminary Result   HEART RATE= 134  bpm   RR Interval= 448  ms   TX Interval= 146  ms   P Horizontal Axis= -7  deg   P Front Axis= 77  deg   QRSD Interval= 89  ms   QT Interval= 289  ms   QRS Axis= 73  deg   T Wave Axis= 31  deg   - ABNORMAL ECG -   Sinus tachycardia   Probable left atrial enlargement   Low voltage, extremity leads   Abnormal R-wave progression, late transition   Borderline ST elevation, lateral leads   Artifact in lead(s) II,III,aVF,V4,V5,V6   Electronically Signed By:    Date and Time of Study: 2023-04-23 17:11:18          Meds given in ED:   Medications   cefTRIAXone (ROCEPHIN) 1 g in sodium chloride 0.9 % 100 mL IVPB-VTB (0 g Intravenous Stopped 4/23/23 2003)   sodium chloride 0.9 % bolus 1,000 mL (0 mL Intravenous Stopped 4/23/23 2003)   droperidol (INAPSINE) injection 5 mg (5 mg Intravenous Given 4/23/23 1758)   metoprolol tartrate (LOPRESSOR) tablet 25 mg (25 mg Oral Given 4/23/23 1800)   metoprolol tartrate (LOPRESSOR) injection 5 mg (5 mg Intravenous Given 4/23/23 1758)    digoxin (LANOXIN) injection 250 mcg (250 mcg Intravenous Given 4/23/23 1905)   digoxin (LANOXIN) injection 250 mcg (250 mcg Intravenous Given 4/23/23 2002)   sodium chloride 0.9 % bolus 1,000 mL (1,000 mL Intravenous New Bag 4/23/23 2003)       Imaging results:  CT Abdomen Pelvis Without Contrast    Result Date: 4/23/2023  1. There is likely acute prostatitis and cystitis. Please correlate clinically. There are no renal or ureteral stones and there is no hydronephrosis bilaterally. 2. Large volume of formed stool within the rectum with anterior mass effect on the urinary bladder. Fecal impaction is suspected. 3. There is a 3.8 cm infrarenal abdominal aortic aneurysm.      CT Head Without Contrast    Result Date: 4/23/2023  Electronically signed by Yoan Pardo MD on 04-23-23 at 1833     Ambulatory status:   - bedrest    Social issues:   Social History     Socioeconomic History    Marital status: Unknown   Tobacco Use    Smoking status: Unknown       NIH Stroke Scale:         Helen Glasgow RN  04/23/23 20:40 EDT

## 2023-04-24 NOTE — CONSULTS
Date of Consultation: 23    Referral Provider: Rogers Cuadra MD     Reason for Consultation: Paroxysmal atrial fibrillation.    Encounter Provider: Praneeth Moon MD    Group of Service: [facility]     Patient Name: Kodi Tolliver    :1960    Chief complaint: Altered mental status.    History of Present Illness:  Kodi Tolliver is a 63 year old pt with a history of schizophrenia, chronic diastolic CHF, CKD II, microcytic anemia, HLD, PE, seizure disorder, prior CVA, and paroxysmal atrial fibrillation.    The patient was sent from his nursing facility after having altered mental status and episodes of aggression.  He was completely confused, agitated, and combative when I saw him earlier today.  He was in restraints, although I could not obtain a history.  He has had some testing done here, and of note, he had a large left perihilar mass concerning for neoplasm on the CT scan of the chest.  He also had likely acute prostatitis and cystitis, as well as an incidentally noted 3.8 cm infrarenal AAA on CT of the abdomen.  He has had multiple episodes of paroxysmal atrial fibrillation since admission.  Again, he could not give me any meaningful history.         ECHO 22  Normal left ventricular cavity with overall normal systolic function, EF 60%                            Normal diastolic function                            Normal function of all valves.     Past Medical History:   Diagnosis Date   • Anxiety    • Aphonia    • Atrial fibrillation    • CHF (congestive heart failure)    • COPD (chronic obstructive pulmonary disease)    • Depression    • Essential hypertension    • Schizophrenia          History reviewed. No pertinent surgical history.      No Known Allergies      No current facility-administered medications on file prior to encounter.     Current Outpatient Medications on File Prior to Encounter   Medication Sig Dispense Refill   • apixaban (ELIQUIS) 5 MG tablet tablet Take 1  tablet by mouth 2 (Two) Times a Day.     • atorvastatin (LIPITOR) 20 MG tablet Take 1 tablet by mouth Daily.     • docusate sodium (COLACE) 100 MG capsule Take 1 capsule by mouth 2 (Two) Times a Day.     • guaifenesin (ROBITUSSIN) 100 MG/5ML liquid Take 20 mL by mouth 3 (Three) Times a Day As Needed for Cough.     • lansoprazole (PREVACID) 30 MG capsule Take 1 capsule by mouth Daily.     • levETIRAcetam (KEPPRA) 500 MG tablet Take 1 tablet by mouth 2 (Two) Times a Day.     • levoFLOXacin (LEVAQUIN) 750 MG tablet Take 1 tablet by mouth Daily.     • melatonin 1 MG tablet Take 3 tablets by mouth At Night As Needed for Sleep.     • metoprolol tartrate (LOPRESSOR) 50 MG tablet Take 1 tablet by mouth Daily.     • OLANZapine (zyPREXA) 10 MG tablet Take 1 tablet by mouth 2 (Two) Times a Day.     • Valproic Acid (DEPAKENE) 250 MG/5ML solution syrup Take 5 mL by mouth Daily.     • acetaminophen (TYLENOL) 325 MG tablet Take 2 tablets by mouth Every 6 (Six) Hours As Needed for Mild Pain.     • albuterol sulfate HFA (Ventolin HFA) 108 (90 Base) MCG/ACT inhaler Inhale 2 puffs Every 4 (Four) Hours As Needed for Wheezing or Shortness of Air.     • magnesium hydroxide (Milk of Magnesia) 400 MG/5ML suspension Take 30 mL by mouth Daily As Needed for Constipation.           Social History     Socioeconomic History   • Marital status: Single   Tobacco Use   • Smoking status: Unknown         History reviewed. No pertinent family history.    REVIEW OF SYSTEMS:   The patient was confused and agitated, and a review of systems was not possible.     Objective:     Vitals:    04/24/23 1200 04/24/23 1300 04/24/23 1446 04/24/23 1504   BP: 117/82  121/65    BP Location: Right arm      Patient Position: Lying      Pulse: 75  77 67   Resp: 18      Temp:  97.8 °F (36.6 °C)     TempSrc:  Oral     SpO2: 91%      Weight:       Height:         Body mass index is 22.23 kg/m².  Flowsheet Rows    Flowsheet Row First Filed Value   Admission Height 180.3 cm  "(71\") Documented at 04/23/2023 2202   Admission Weight 68 kg (150 lb) Documented at 04/23/2023 2015           General:    No acute distress, agitated and confused                   Head:    Normocephalic, atraumatic.   Eyes:          Conjunctivae and sclerae normal, no icterus.   Throat:   No oral lesions, no thrush, oral mucosa moist.    Neck:   Supple, trachea midline.   Lungs:     Clear to auscultation bilaterally anteriorly.    Heart:    Regular rhythm and normal rate.  No murmurs, gallops, or rubs noted.   Abdomen:     Soft, non-tender, non-distended, positive bowel sounds.    Extremities:   No clubbing, cyanosis, or edema.     Pulses:   Pulses palpable and equal bilaterally.    Skin:   No bleeding or rash.   Neuro:   Non-focal other than confusion   Psychiatric:  Agitated and confused.       Lab Review:                Results from last 7 days   Lab Units 04/24/23  0447   SODIUM mmol/L 147*   POTASSIUM mmol/L 5.1   CHLORIDE mmol/L 108*   CO2 mmol/L 30.0*   BUN mg/dL 48*   CREATININE mg/dL 1.88*   GLUCOSE mg/dL 92   CALCIUM mg/dL 8.8     Results from last 7 days   Lab Units 04/23/23  1931 04/23/23  1608   HSTROP T ng/L 31* 25*     Results from last 7 days   Lab Units 04/24/23  0447   WBC 10*3/mm3 10.24   HEMOGLOBIN g/dL 9.2*   HEMATOCRIT % 29.4*   PLATELETS 10*3/mm3 267     Results from last 7 days   Lab Units 04/23/23  1720   INR  1.27*   APTT seconds 33.8                   EKG (reviewed by me personally):              Assessment:   1.  Altered mental status, likely acute metabolic encephalopathy  2.  Acute cystitis and prostatitis  3.  Paroxysmal atrial fibrillation  4.  Acute kidney injury with with stage IIIb chronic kidney disease  5.  Left perihilar mass, concerning for malignancy  6.  Infrarenal AAA (3.8 cm)  7.  Seizure disorder  8.  Schizophrenia  9.  Hypertension  10.  Hypernatremia  11.  History of pulmonary embolism  12.  Chronic diastolic CHF  13.  Multifactorial anemia    Plan:       The patient " has a history of atrial fibrillation in the past.  This is likely being driven by his cystitis and prostatitis.  He also was very agitated, which may also be driving some of the atrial fibrillation to occur more frequently.    He is already anticoagulated with Eliquis 5 mg twice a day.  I am going to keep the metoprolol at 50 mg in the morning, but add a 25 mg dose at night to see if this will help.  He also did get 500 mcg of IV digoxin yesterday.  With his current mental state, he is not a good candidate for an echocardiogram currently.  He has had a normal ejection fraction in the past.  He does not appear to be in congestive heart failure clinically.  Eventually, he will need further evaluation of the lung mass, likely when his mental status is improved and his other acute issues have resolved.  Pulmonology is following.    Thank you very much for this consult.    Jaylen Moon MD

## 2023-04-24 NOTE — THERAPY EVALUATION
Acute Care - Speech Language Pathology   Swallow Initial Evaluation [facility]     Patient Name: Kodi Tolliver  : 1960  MRN: [id number]  Today's Date: 2023               Admit Date: 2023    Visit Dx:     ICD-10-CM ICD-9-CM   1. Acute UTI  N39.0 599.0   2. MARYJANE (acute kidney injury)  N17.9 584.9   3. Atrial fibrillation with RVR  I48.91 427.31   4. Altered mental status, unspecified altered mental status type  R41.82 780.97   5. Full code status  Z78.9 V49.89     Patient Active Problem List   Diagnosis   • Paroxysmal atrial fibrillation   • Acute prostatitis/cystitis without hematuria   • Acute metabolic encephalopathy   • MARYJANE (acute kidney injury)   • COPD (chronic obstructive pulmonary disease)   • Chronic respiratory failure with hypoxia and hypercapnia   • Stage 3b chronic kidney disease   • Schizophrenia   • Essential hypertension   • Mass of left lung     Past Medical History:   Diagnosis Date   • Anxiety    • Aphonia    • Atrial fibrillation    • CHF (congestive heart failure)    • COPD (chronic obstructive pulmonary disease)    • Depression    • Essential hypertension    • Schizophrenia      History reviewed. No pertinent surgical history.    SLP Recommendation and Plan  SLP Swallowing Diagnosis: swallow WFL/no suspected pharyngeal impairment (23)  SLP Diet Recommendation: soft to chew textures, chopped, thin liquids (23)  Recommended Precautions and Strategies: upright posture during/after eating, small bites of food and sips of liquid (23)  SLP Rec. for Method of Medication Administration: meds whole, as tolerated (23)     Monitor for Signs of Aspiration: yes, notify SLP if any concerns (23)     Swallow Criteria for Skilled Therapeutic Interventions Met: demonstrates skilled criteria (23)  Anticipated Discharge Disposition (SLP): long term acute care facility (23)  Rehab Potential/Prognosis, Swallowing: good, to  achieve stated therapy goals (04/24/23 0900)  Therapy Frequency (Swallow): PRN (04/24/23 0900)  Predicted Duration Therapy Intervention (Days): until discharge (04/24/23 0900)                                        Plan of Care Reviewed With: patient  Outcome Evaluation: Clinical swallow evaluation completed recommend mechanical soft chopped and thin liquids. Meds whole with puree or thins. Small bites and sips.      SWALLOW EVALUATION (last 72 hours)     SLP Adult Swallow Evaluation     Row Name 04/24/23 0900                   Rehab Evaluation    Document Type evaluation  -KA        Subjective Information no complaints  -KA        Patient Observations alert;cooperative  -KA        Patient Effort good  -KA        Symptoms Noted During/After Treatment none  -KA           General Information    Patient Profile Reviewed yes  -KA        Pertinent History Of Current Problem prostatis and cystitis, lung mass. Hx of schizophrenia and CVA.  -KA        Current Method of Nutrition NPO  -KA        Precautions/Limitations, Vision WFL;for purposes of eval  -KA        Precautions/Limitations, Hearing WFL;for purposes of eval  -KA        Prior Level of Function-Communication motor speech impairment  -KA        Prior Level of Function-Swallowing unknown  -KA        Plans/Goals Discussed with patient  -KA        Barriers to Rehab medically complex  -KA        Patient's Goals for Discharge return to PO diet  -KA           Pain    Additional Documentation Pain Scale: FACES Pre/Post-Treatment (Group)  -KA           Pain Scale: FACES Pre/Post-Treatment    Pain: FACES Scale, Pretreatment 0-->no hurt  -KA        Posttreatment Pain Rating 0-->no hurt  -KA           Oral Motor Structure and Function    Dentition Assessment missing teeth  -KA        Secretion Management WNL/WFL  -KA        Mucosal Quality moist, healthy  -KA        Volitional Swallow WFL  -KA        Volitional Cough weak  -KA           Oral Musculature and Cranial Nerve  Assessment    Oral Motor General Assessment generalized oral motor weakness;oral labial or buccal impairment  -KA        Oral Labial or Buccal Impairment, Detail, Cranial Nerve VII (Facial): left labial droop  -KA        Vocal Impairment, Detail. Cranial Nerve X (Vagus) vocal quality abnormality (see comments);other (see comments)  weak, raspy and hoarse vocal quality  -KA           General Eating/Swallowing Observations    Eating/Swallowing Skills fed by SLP  -KA        Positioning During Eating upright in bed  -KA        Utensils Used spoon;cup;straw  -KA        Consistencies Trialed soft to chew textures;chopped;mixed consistency;pureed;thin liquids;nectar/syrup-thick liquids  -KA           Clinical Swallow Eval    Clinical Swallow Evaluation Summary Patient demonstrated no overt s/s of pen/asp with ice chips, NTL via spoon and cup, thins via spoon, cup and straw, puree and mechanical soft mixed consistency, and meds with puree. Laryngeal elevation felt adequate upon neck palpation. Mastication WNL for soft solids even with limited dentition.  -KA           SLP Evaluation Clinical Impression    SLP Swallowing Diagnosis swallow WFL/no suspected pharyngeal impairment  -KA        Functional Impact risk of aspiration/pneumonia  -KA        Rehab Potential/Prognosis, Swallowing good, to achieve stated therapy goals  -KA        Swallow Criteria for Skilled Therapeutic Interventions Met demonstrates skilled criteria  -KA           Recommendations    Therapy Frequency (Swallow) PRN  -KA        Predicted Duration Therapy Intervention (Days) until discharge  -KA        SLP Diet Recommendation soft to chew textures;chopped;thin liquids  -KA        Recommended Precautions and Strategies upright posture during/after eating;small bites of food and sips of liquid  -KA        Oral Care Recommendations Oral Care BID/PRN  -KA        SLP Rec. for Method of Medication Administration meds whole;as tolerated  -KA        Monitor for  Signs of Aspiration yes;notify SLP if any concerns  -KA        Anticipated Discharge Disposition (SLP) long term acute care facility  -KA           Swallow Goals (SLP)    Swallow STGs diet tolerance goal selection (SLP)  -KA        Diet Tolerance Goal Selection (SLP) Patient will tolerate trials of  -KA           (STG) Patient will tolerate trials of    Consistencies Trialed (Tolerate trials) soft to chew (chopped) textures;thin liquids  -KA        Desired Outcome (Tolerate trials) without signs/symptoms of aspiration  -KA              User Key  (r) = Recorded By, (t) = Taken By, (c) = Cosigned By    Initials Name Effective Dates    Romero Guthrie MA,CCC-SLP 06/02/22 -                 EDUCATION  The patient has been educated in the following areas:   Dysphagia (Swallowing Impairment).        SLP GOALS     Row Name 04/24/23 0900             (STG) Patient will tolerate trials of    Consistencies Trialed (Tolerate trials) soft to chew (chopped) textures;thin liquids  -KA      Desired Outcome (Tolerate trials) without signs/symptoms of aspiration  -KA            User Key  (r) = Recorded By, (t) = Taken By, (c) = Cosigned By    Initials Name Provider Type    Romero Guthrie MA,CCC-SLP Speech and Language Pathologist                   Time Calculation:    Time Calculation- SLP     Row Name 04/24/23 0940             Time Calculation- SLP    SLP Start Time 0830  -KA      SLP Received On 04/24/23  -KA         Untimed Charges    SLP Eval/Re-eval  ST Eval Oral Pharyng Swallow - 11594  -KA      [id number] Eval Oral Pharyng Swallow Minutes 60  -KA         Total Minutes    Untimed Charges Total Minutes 60  -KA       Total Minutes 60  -KA            User Key  (r) = Recorded By, (t) = Taken By, (c) = Cosigned By    Initials Name Provider Type    Romero Guthrie MA,CCC-SLP Speech and Language Pathologist                Therapy Charges for Today     Code Description Service Date Service Provider Modifiers Qty    [id number]  [facility] ORAL PHARYNG SWALLOW 4 4/24/2023 Romero Gonzales MA,CCC-SLP GN 1               Romero Gonzales MA,CCC-SLP  4/24/2023

## 2023-04-24 NOTE — PROGRESS NOTES
Called to evaluate patient at bedside secondary to agitation. RN reports patient has been spitting and fighting against his restraints and is requesting to move the patient to the ICU. Patient has bilateral soft wrist restraints applied and has received IM Zyprexa and IV Haldol x 2. He is agitated on exam but communicative and he is able to be redirected at this time. His vital signs are stable: , /74, and O2 92% on 4L NC.  Do not think transfer to ICU is warranted at this time but will consult pulmonology for evaluation. Will order another dose of IV Haldol PRN agitation. Psychiatry has been consulted. Continue to monitor and deescalate restraints as patient calms down.    BILL Chaudhary  [facility]

## 2023-04-24 NOTE — PLAN OF CARE
Goal Outcome Evaluation:  Plan of Care Reviewed With: patient        Progress: no change  Outcome Evaluation: HR was elevated, but is within normal limits now.  A-fib with RVR converted back to sinus tach and NSR.  other VS stable.  the pt has been confused, hallucinating, restless, uncooperative, and removing monitoring equipment.  APRNs and MD notified.  meds given per orders.  access, psych, and cardiology are consulted.  restraints in place.  speech therapy and wound consults.  will continue to monitor.

## 2023-04-24 NOTE — PLAN OF CARE
Goal Outcome Evaluation:  Plan of Care Reviewed With: patient           Outcome Evaluation: Clinical swallow evaluation completed recommend mechanical soft chopped and thin liquids. Meds whole with puree or thins. Small bites and sips.

## 2023-04-24 NOTE — NURSING NOTE
Cwon consult received. Patient with a healed skin injury to both right and left buttocks. There is no current evidence of a pressure related injury, however pt is at risk.  The newly healed tissue is fragile and vulnerable, so I will recommend using zinc ointment for moisture and barrier protection.  I have added orders as well as prevention measure orders in Epic    Please don't hesitate to call with any questions.

## 2023-04-25 LAB
ALBUMIN SERPL-MCNC: 3.3 G/DL (ref 3.5–5.2)
ALBUMIN/GLOB SERPL: 0.9 G/DL
ALP SERPL-CCNC: 58 U/L (ref 39–117)
ALT SERPL W P-5'-P-CCNC: 15 U/L (ref 1–41)
ANION GAP SERPL CALCULATED.3IONS-SCNC: 11.1 MMOL/L (ref 5–15)
AST SERPL-CCNC: 60 U/L (ref 1–40)
BILIRUB SERPL-MCNC: 0.3 MG/DL (ref 0–1.2)
BUN SERPL-MCNC: 24 MG/DL (ref 8–23)
BUN/CREAT SERPL: 20.5 (ref 7–25)
CALCIUM SPEC-SCNC: 9.3 MG/DL (ref 8.6–10.5)
CHLORIDE SERPL-SCNC: 107 MMOL/L (ref 98–107)
CO2 SERPL-SCNC: 29.9 MMOL/L (ref 22–29)
CREAT SERPL-MCNC: 1.17 MG/DL (ref 0.76–1.27)
EGFRCR SERPLBLD CKD-EPI 2021: 69.6 ML/MIN/1.73
GLOBULIN UR ELPH-MCNC: 3.8 GM/DL
GLUCOSE SERPL-MCNC: 71 MG/DL (ref 65–99)
OSMOLALITY SERPL: 310 MOSM/KG (ref 280–301)
POTASSIUM SERPL-SCNC: 5.1 MMOL/L (ref 3.5–5.2)
PROT SERPL-MCNC: 7.1 G/DL (ref 6–8.5)
SODIUM SERPL-SCNC: 148 MMOL/L (ref 136–145)

## 2023-04-25 PROCEDURE — 80053 COMPREHEN METABOLIC PANEL: CPT | Performed by: HOSPITALIST

## 2023-04-25 PROCEDURE — 99232 SBSQ HOSP IP/OBS MODERATE 35: CPT | Performed by: NURSE PRACTITIONER

## 2023-04-25 PROCEDURE — 25010000002 ENOXAPARIN PER 10 MG: Performed by: NURSE PRACTITIONER

## 2023-04-25 PROCEDURE — 25010000002 CEFTRIAXONE PER 250 MG: Performed by: NURSE PRACTITIONER

## 2023-04-25 PROCEDURE — 83930 ASSAY OF BLOOD OSMOLALITY: CPT | Performed by: HOSPITALIST

## 2023-04-25 RX ORDER — VALPROIC ACID 250 MG/5ML
250 SOLUTION ORAL 3 TIMES DAILY
Status: DISCONTINUED | OUTPATIENT
Start: 2023-04-25 | End: 2023-05-04 | Stop reason: HOSPADM

## 2023-04-25 RX ORDER — ENOXAPARIN SODIUM 100 MG/ML
1 INJECTION SUBCUTANEOUS EVERY 12 HOURS
Status: DISCONTINUED | OUTPATIENT
Start: 2023-04-25 | End: 2023-04-26

## 2023-04-25 RX ORDER — DEXTROSE MONOHYDRATE 50 MG/ML
100 INJECTION, SOLUTION INTRAVENOUS CONTINUOUS
Status: ACTIVE | OUTPATIENT
Start: 2023-04-25 | End: 2023-04-26

## 2023-04-25 RX ADMIN — APIXABAN 5 MG: 5 TABLET, FILM COATED ORAL at 09:04

## 2023-04-25 RX ADMIN — OLANZAPINE 10 MG: 10 INJECTION, POWDER, FOR SOLUTION INTRAMUSCULAR at 05:36

## 2023-04-25 RX ADMIN — OLANZAPINE 10 MG: 10 TABLET ORAL at 09:03

## 2023-04-25 RX ADMIN — OLANZAPINE 10 MG: 10 TABLET ORAL at 20:37

## 2023-04-25 RX ADMIN — VALPROIC ACID 250 MG: 250 SOLUTION ORAL at 09:03

## 2023-04-25 RX ADMIN — VALPROIC ACID 250 MG: 250 SOLUTION ORAL at 17:28

## 2023-04-25 RX ADMIN — VALPROIC ACID 250 MG: 250 SOLUTION ORAL at 20:36

## 2023-04-25 RX ADMIN — CEFTRIAXONE SODIUM 2 G: 2 INJECTION, POWDER, FOR SOLUTION INTRAMUSCULAR; INTRAVENOUS at 22:15

## 2023-04-25 RX ADMIN — LEVETIRACETAM 500 MG: 500 TABLET, FILM COATED ORAL at 09:04

## 2023-04-25 RX ADMIN — DEXTROSE MONOHYDRATE 100 ML/HR: 50 INJECTION, SOLUTION INTRAVENOUS at 14:43

## 2023-04-25 RX ADMIN — ENOXAPARIN SODIUM 70 MG: 100 INJECTION SUBCUTANEOUS at 20:36

## 2023-04-25 RX ADMIN — DOCUSATE SODIUM 50MG AND SENNOSIDES 8.6MG 2 TABLET: 8.6; 5 TABLET, FILM COATED ORAL at 20:37

## 2023-04-25 RX ADMIN — METOPROLOL TARTRATE 25 MG: 25 TABLET, FILM COATED ORAL at 20:37

## 2023-04-25 RX ADMIN — DOCUSATE SODIUM 100 MG: 100 CAPSULE, LIQUID FILLED ORAL at 20:37

## 2023-04-25 RX ADMIN — LEVETIRACETAM 500 MG: 500 TABLET, FILM COATED ORAL at 20:37

## 2023-04-25 RX ADMIN — METOPROLOL TARTRATE 50 MG: 50 TABLET, FILM COATED ORAL at 09:03

## 2023-04-25 RX ADMIN — ATORVASTATIN CALCIUM 20 MG: 20 TABLET, FILM COATED ORAL at 09:03

## 2023-04-25 NOTE — PROGRESS NOTES
IDENTIFYING INFORMATION: The patient is a 63-year-old  male with a history of schizophrenia admitted with acute prostatitis and cystitis.  He is seen related to behavioral issues since hospitalization.    CHIEF COMPLAINT: None given    INFORMANT: Chart, patient cannot be awakened for interview    RELIABILITY: Fair    HISTORY OF PRESENT ILLNESS: The patient is a 63-year-old chronically psychiatrically ill  male who resides at [facility].  The patient was admitted with acute prostatitis and cystitis.  He is currently prescribed psychiatric medications include Depakene and Zyprexa.  The patient reportedly became combative and was refusing medications approximately 3 days prior to admission.  When seen today the patient is sleeping soundly and does not awaken for interview.  Yesterday, this physician was contacted regarding the patient's aggressive behavior and as needed medication was prescribed.  When seen today the patient is in soft wrist restraints but is sleeping soundly and attempts to awaken him are unsuccessful.    PAST PSYCHIATRIC HISTORY: As above    PAST MEDICAL HISTORY: Significant for prostatitis, cystitis, paroxysmal atrial fibrillation, COPD, chronic respiratory failure, stage III kidney disease, hypertension, mass of left lung    MEDICATIONS:   Current Facility-Administered Medications   Medication Dose Route Frequency Provider Last Rate Last Admin   • acetaminophen (TYLENOL) tablet 650 mg  650 mg Oral Q4H PRN Sam Killian APRN       • aluminum-magnesium hydroxide-simethicone (MAALOX MAX) 400-400-40 MG/5ML suspension 15 mL  15 mL Oral Q6H PRN Sam Killian APRN       • atorvastatin (LIPITOR) tablet 20 mg  20 mg Oral Daily Rogers Cuadra MD   20 mg at 04/25/23 0903   • sennosides-docusate (PERICOLACE) 8.6-50 MG per tablet 2 tablet  2 tablet Oral BID Sam Killian APRN        And   • polyethylene glycol (MIRALAX) packet 17 g  17 g Oral Daily PRN Sam Killian APRN         And   • bisacodyl (DULCOLAX) EC tablet 5 mg  5 mg Oral Daily PRN Sam Killian APRN        And   • bisacodyl (DULCOLAX) suppository 10 mg  10 mg Rectal Daily PRN Sam Killian APRN       • cefTRIAXone (ROCEPHIN) 2 g in sodium chloride 0.9 % 100 mL IVPB-VTB  2 g Intravenous Q24H Sam Killian APRN 200 mL/hr at 04/24/23 2359 2 g at 04/24/23 2359   • dextrose (D5W) 5 % infusion  100 mL/hr Intravenous Continuous Yinka Mccoy MD       • dilTIAZem (CARDIZEM) 100 mg in 100 mL NS infusion (ADV)  5-10 mg/hr Intravenous Titrated James Jaime MD   Stopped at 04/24/23 1504   • docusate sodium (COLACE) capsule 100 mg  100 mg Oral BID Rogers Cuadra MD       • Enoxaparin Sodium (LOVENOX) syringe 70 mg  1 mg/kg Subcutaneous Q12H Pooja Braun APRN       • levETIRAcetam (KEPPRA) tablet 500 mg  500 mg Oral BID Rogers Cuadra MD   500 mg at 04/25/23 0904   • metoprolol tartrate (LOPRESSOR) tablet 25 mg  25 mg Oral Nightly Praneeth Moon MD       • metoprolol tartrate (LOPRESSOR) tablet 50 mg  50 mg Oral Daily Rogers Cuadra MD   50 mg at 04/25/23 0903   • nitroglycerin (NITROSTAT) SL tablet 0.4 mg  0.4 mg Sublingual Q5 Min PRN Sam Killian APRN       • OLANZapine (zyPREXA) injection 10 mg  10 mg Intramuscular Q8H PRN Praneeth Cordoba III, MD   10 mg at 04/25/23 0536   • OLANZapine (zyPREXA) tablet 10 mg  10 mg Oral BID Rogers Cuadra MD   10 mg at 04/25/23 0903   • ondansetron (ZOFRAN) tablet 4 mg  4 mg Oral Q6H PRN Sam Killian APRN        Or   • ondansetron (ZOFRAN) injection 4 mg  4 mg Intravenous Q6H PRN Sam Killian APRN       • pantoprazole (PROTONIX) EC tablet 40 mg  40 mg Oral Q AM Rogers Cuadra MD       • sodium chloride 0.9 % flush 10 mL  10 mL Intravenous PRN Sam Killian APRN   10 mL at 04/23/23 2240   • sodium chloride 0.9 % infusion 40 mL  40 mL Intravenous PRN Sam Killian APRN       • Valproic Acid (DEPAKENE) syrup 250 mg   250 mg Oral TID Praneeth Cordoba III, MD             ALLERGIES: None    FAMILY HISTORY: Not obtained    SOCIAL HISTORY: The patient is chronically psychiatrically ill and resides in a local nursing facility.  There is no reported use of alcohol tobaccos or street drugs    MENTAL STATUS EXAM: The patient is a soundly sleeping elderly male who is in soft wrist restraints.  Attempts to awaken him were not successful.    ASSETS/LIABILITIES: To be assessed    DIAGNOSTIC IMPRESSION: Acute metabolic encephalopathy secondary to prostatitis and cystitis superimposed on schizophrenia, chronic, medical problems described previously     PLAN: I would recommend continuation of the patient's currently prescribed Zyprexa and have also left orders for as needed Zyprexa to be given for breakthrough agitation.  Additionally, I will increase his Depakene dose to a more definitive one of his 250 mg 3 times daily as his previous dose had been essentially homeopathic one.  I suspect that his baseline mentation should return with resolution of his infectious process.  I will continue to follow with you.

## 2023-04-25 NOTE — PROGRESS NOTES
"    Patient Name: Kodi Tolliver  :1960  63 y.o.      Patient Care Team:  Provider, No Known as PCP - General    Chief Complaint: follow up PAF    Interval History: he was sinus tach earlier. He was hallucinating and confused. He has since received zyprexa. He is somnolent and difficult to arouse. HR sinus in the 60's.        Objective   Vital Signs  Temp:  [97.8 °F (36.6 °C)] 97.8 °F (36.6 °C)  Heart Rate:  [67-91] 91  Resp:  [18] 18  BP: (117-147)/(65-85) 147/85    Intake/Output Summary (Last 24 hours) at 2023 1050  Last data filed at 2023 0900  Gross per 24 hour   Intake 480 ml   Output 1200 ml   Net -720 ml     Flowsheet Rows    Flowsheet Row First Filed Value   Admission Height 180.3 cm (71\") Documented at 2023   Admission Weight 68 kg (150 lb) Documented at 2023          Physical Exam:   General Appearance:    Somnolent, restrained   Lungs:     Clear to auscultation.  Normal respiratory effort and rate.      Heart:    Regular rhythm and normal rate, normal S1 and S2, no murmurs, gallops or rubs.     Chest Wall:    No abnormalities observed   Abdomen:     Soft, nontender, positive bowel sounds.     Extremities:   no cyanosis, clubbing or edema.  No marked joint deformities.  Adequate musculoskeletal strength.       Results Review:    Results from last 7 days   Lab Units 23  0529   SODIUM mmol/L 148*   POTASSIUM mmol/L 5.1   CHLORIDE mmol/L 107   CO2 mmol/L 29.9*   BUN mg/dL 24*   CREATININE mg/dL 1.17   GLUCOSE mg/dL 71   CALCIUM mg/dL 9.3     Results from last 7 days   Lab Units 23  1931 23  1608   HSTROP T ng/L 31* 25*     Results from last 7 days   Lab Units 23  0447   WBC 10*3/mm3 10.24   HEMOGLOBIN g/dL 9.2*   HEMATOCRIT % 29.4*   PLATELETS 10*3/mm3 267     Results from last 7 days   Lab Units 23  1720   INR  1.27*   APTT seconds 33.8                       Medication Review:   apixaban, 5 mg, Oral, BID  atorvastatin, 20 mg, Oral, " Daily  cefTRIAXone, 2 g, Intravenous, Q24H  docusate sodium, 100 mg, Oral, BID  levETIRAcetam, 500 mg, Oral, BID  metoprolol tartrate, 25 mg, Oral, Nightly  metoprolol tartrate, 50 mg, Oral, Daily  OLANZapine, 10 mg, Oral, BID  pantoprazole, 40 mg, Oral, Q AM  senna-docusate sodium, 2 tablet, Oral, BID  Valproic Acid, 250 mg, Oral, Daily         dextrose, 100 mL/hr  dilTIAZem, 5-10 mg/hr, Last Rate: Stopped (04/24/23 1504)        Assessment & Plan   1.  Altered mental status, likely acute metabolic encephalopathy  2.  Acute cystitis and prostatitis  3.  Paroxysmal atrial fibrillation - currently in SR. Anticoagulated with apixaban. Consider echo when mental status improves. Currently cardiac status appears stable.  4.  Acute kidney injury with with stage IIIb chronic kidney disease  5.  Left perihilar mass, concerning for malignancy  6.  Infrarenal AAA (3.8 cm)  7.  Seizure disorder  8.  Schizophrenia  9.  Hypertension  10.  Hypernatremia  11.  History of pulmonary embolism  12.  Chronic diastolic CHF  13.  Multifactorial anemia    pulm planning bronch. Will transition to lovenox - can be held at their discretion prior to bronch.     BILL Alvarado  [facility]  04/25/23  10:50 EDT

## 2023-04-25 NOTE — PROGRESS NOTES
Access did follow up on pt. Pt seen by psychiatrist but has been somnolent today. Psychiatrist increased pt's Depakene and added PRN Zyprexa for agitation on top of already scheduled Zyprexa. Access will continue to follow.

## 2023-04-25 NOTE — PLAN OF CARE
Problem: Fall Injury Risk  Goal: Absence of Fall and Fall-Related Injury  Outcome: Ongoing, Not Progressing  Intervention: Promote Injury-Free Environment  Recent Flowsheet Documentation  Taken 4/25/2023 0400 by Yong Yousif RN  Safety Promotion/Fall Prevention:   assistive device/personal items within reach   clutter free environment maintained   room organization consistent   safety round/check completed  Taken 4/25/2023 0000 by Yong Yousif RN  Safety Promotion/Fall Prevention:   assistive device/personal items within reach   clutter free environment maintained   room organization consistent   safety round/check completed  Taken 4/24/2023 2200 by Yong Yousif RN  Safety Promotion/Fall Prevention:   assistive device/personal items within reach   clutter free environment maintained   room organization consistent   safety round/check completed  Taken 4/24/2023 2031 by Yong Yousif RN  Safety Promotion/Fall Prevention:   assistive device/personal items within reach   clutter free environment maintained   room organization consistent   safety round/check completed     Problem: Restraint, Nonviolent  Goal: Absence of Harm or Injury  Outcome: Ongoing, Not Progressing  Intervention: Implement Least Restrictive Safety Strategies  Recent Flowsheet Documentation  Taken 4/25/2023 0400 by Yong Yousif RN  Medical Device Protection:   IV pole/bag removed from visual field   skin sleeve  Less Restrictive Alternative:   bed alarm in use   calming techniques promoted  De-Escalation Techniques:   increased round frequency   quiet time facilitated   reoriented  Diversional Activities: television  Taken 4/25/2023 0200 by Yong Yousif RN  Medical Device Protection:   IV pole/bag removed from visual field   skin sleeve  Less Restrictive Alternative:   bed alarm in use   environment adjusted  De-Escalation Techniques: increased round frequency  Diversional Activities: television  Taken 4/25/2023 0000 by Law  ERIC Beach  Medical Device Protection:   skin sleeve   IV pole/bag removed from visual field  Less Restrictive Alternative:   bed alarm in use   calming techniques promoted  De-Escalation Techniques: increased round frequency  Diversional Activities: television  Taken 4/24/2023 2200 by Yong Yousif RN  Medical Device Protection:   skin sleeve   IV pole/bag removed from visual field  Less Restrictive Alternative:   bed alarm in use   calming techniques promoted   coping techniques promoted   emotional support provided  De-Escalation Techniques:   increased round frequency   quiet time facilitated   reoriented   stimulation decreased  Diversional Activities: television  Taken 4/24/2023 2031 by Yong Yousif RN  Medical Device Protection:   skin sleeve   IV pole/bag removed from visual field  Diversional Activities: television  Taken 4/24/2023 2000 by Yong Yousif RN  Medical Device Protection:   skin sleeve   IV pole/bag removed from visual field  Less Restrictive Alternative:   bed alarm in use   calming techniques promoted   coping techniques promoted   emotional support provided   environment adjusted   medication offered  De-Escalation Techniques:   quiet time facilitated   reoriented   stimulation decreased   verbally redirected   increased round frequency   medication administered  Diversional Activities: television  Intervention: Protect Dignity, Rights, and Personal Wellbeing  Recent Flowsheet Documentation  Taken 4/24/2023 2031 by Yong Yousif RN  Trust Relationship/Rapport:   care explained   thoughts/feelings acknowledged     Problem: Skin Injury Risk Increased  Goal: Skin Health and Integrity  Outcome: Ongoing, Not Progressing     Problem: Adult Inpatient Plan of Care  Goal: Plan of Care Review  Outcome: Ongoing, Not Progressing  Flowsheets  Taken 4/25/2023 0542 by Yong Yousif RN  Outcome Evaluation: VSS.  Sinus tach while agitated. 2 doses of prn zyprexa given this shift.  Patient  remains in 2 point restraints and not showing signs of readiness to come out of them.  All oral medication was refused. Will continue to monitor.  Taken 4/24/2023 0939 by Romero Gonzales MA,CCC-SLP  Plan of Care Reviewed With: patient  Goal: Patient-Specific Goal (Individualized)  Outcome: Ongoing, Not Progressing  Goal: Absence of Hospital-Acquired Illness or Injury  Outcome: Ongoing, Not Progressing  Intervention: Identify and Manage Fall Risk  Recent Flowsheet Documentation  Taken 4/25/2023 0400 by Yong Yousif RN  Safety Promotion/Fall Prevention:   assistive device/personal items within reach   clutter free environment maintained   room organization consistent   safety round/check completed  Taken 4/25/2023 0000 by Yong Yousif RN  Safety Promotion/Fall Prevention:   assistive device/personal items within reach   clutter free environment maintained   room organization consistent   safety round/check completed  Taken 4/24/2023 2200 by Yong Yousif RN  Safety Promotion/Fall Prevention:   assistive device/personal items within reach   clutter free environment maintained   room organization consistent   safety round/check completed  Taken 4/24/2023 2031 by Yong Yousif RN  Safety Promotion/Fall Prevention:   assistive device/personal items within reach   clutter free environment maintained   room organization consistent   safety round/check completed  Intervention: Prevent and Manage VTE (Venous Thromboembolism) Risk  Recent Flowsheet Documentation  Taken 4/24/2023 2031 by Yong Yousif RN  VTE Prevention/Management: (eliquis) --  Intervention: Prevent Infection  Recent Flowsheet Documentation  Taken 4/25/2023 0400 by Yong Yousif RN  Infection Prevention:   hand hygiene promoted   personal protective equipment utilized   rest/sleep promoted   single patient room provided  Taken 4/24/2023 2200 by Yong Yousif RN  Infection Prevention:   hand hygiene promoted   personal protective equipment  utilized   rest/sleep promoted   single patient room provided  Taken 4/24/2023 2031 by Yong Yousif RN  Infection Prevention:   personal protective equipment utilized   rest/sleep promoted   single patient room provided  Goal: Optimal Comfort and Wellbeing  Outcome: Ongoing, Not Progressing  Intervention: Provide Person-Centered Care  Recent Flowsheet Documentation  Taken 4/24/2023 2031 by Yong Yousif RN  Trust Relationship/Rapport:   care explained   thoughts/feelings acknowledged  Goal: Readiness for Transition of Care  Outcome: Ongoing, Not Progressing     Problem: UTI (Urinary Tract Infection)  Goal: Improved Infection Symptoms  Outcome: Ongoing, Not Progressing   Goal Outcome Evaluation:              Outcome Evaluation: VSS.  Sinus tach while agitated. 2 doses of prn zyprexa given this shift.  Patient remains in 2 point restraints and not showing signs of readiness to come out of them.  All oral medication was refused. Will continue to monitor.

## 2023-04-25 NOTE — PROGRESS NOTES
"    DAILY PROGRESS NOTE  [facility]    Patient Identification:  Name: Kodi Tolliver  Age: 63 y.o.  Sex: male  :  1960  MRN: [id number]         Primary Care Physician: Provider, No Known    Subjective:  Interval History: Remains in soft restraints.  His hoarseness is so pronounced is very hard to hear but when you get close enough I can understand him.  He remains confused today.  If anything he seems a bit more sedated today as he has a harder time keeping his eyes open but he is not aggressive in behavior and seems more relaxed.  History and ROS not the most reliable at this time    Objective: Chronically ill in appearance.  Nontoxic.  No family at bedside    Scheduled Meds:apixaban, 5 mg, Oral, BID  atorvastatin, 20 mg, Oral, Daily  cefTRIAXone, 2 g, Intravenous, Q24H  docusate sodium, 100 mg, Oral, BID  levETIRAcetam, 500 mg, Oral, BID  metoprolol tartrate, 25 mg, Oral, Nightly  metoprolol tartrate, 50 mg, Oral, Daily  OLANZapine, 10 mg, Oral, BID  pantoprazole, 40 mg, Oral, Q AM  senna-docusate sodium, 2 tablet, Oral, BID  Valproic Acid, 250 mg, Oral, Daily      Continuous Infusions:dextrose, 100 mL/hr  dilTIAZem, 5-10 mg/hr, Last Rate: Stopped (23 1504)        Vital signs in last 24 hours:  Temp:  [97.8 °F (36.6 °C)] 97.8 °F (36.6 °C)  Heart Rate:  [67-91] 91  Resp:  [18] 18  BP: (117-147)/(65-85) 147/85    Intake/Output:    Intake/Output Summary (Last 24 hours) at 2023 1106  Last data filed at 2023 0900  Gross per 24 hour   Intake 480 ml   Output 1200 ml   Net -720 ml       Exam:  /85 (BP Location: Right arm, Patient Position: Lying)   Pulse 91   Temp 97.8 °F (36.6 °C) (Oral)   Resp 18   Ht 180.3 cm (71\")   Wt 72.3 kg (159 lb 6.4 oz)   SpO2 95%   BMI 22.23 kg/m²     General Appearance:    Alert but more calm as well as sedated today.  Severe hoarseness                          Head:    Normocephalic, without obvious abnormality, atraumatic                         "   Eyes:    PERRL, conjunctivae/corneas clear, EOM's intact, both eyes                         Throat: Poor dentition; oral mucosa pink and moist                           Neck:   Supple, no rigidity or JVD                         Lungs:    Clear to auscultation bilaterally, respirations unlabored                          Heart:    Regular rate and rhythm, S1 and S2 normal                  Abdomen:     Soft, nontender, bowel sounds active                 Extremities: Moving all, no cyanosis or edema                        Pulses:   Pulses palpable in all extremities                  Neurologic:   CNII-XII intact     Data Review:  Labs in chart were reviewed.    Assessment:  Active Hospital Problems    Diagnosis  POA   • **Acute prostatitis/cystitis without hematuria [N30.00]  Yes   • Paroxysmal atrial fibrillation [I48.0]  Yes   • Acute metabolic encephalopathy [G93.41]  Yes   • MARYJANE (acute kidney injury) [N17.9]  Yes   • COPD (chronic obstructive pulmonary disease) [J44.9]  Yes   • Chronic respiratory failure with hypoxia and hypercapnia [J96.11, J96.12]  Yes   • Stage 3b chronic kidney disease [N18.32]  Yes   • Schizophrenia [F20.9]  Yes   • Essential hypertension [I10]  Yes   • Mass of left lung [R91.8]  Yes      Resolved Hospital Problems   No resolved problems to display.       Plan:    Hoarseness with lung mass very concerning for primary lung cancer   -Pulmonary consulted and planning on bronchoscopy but current mentation is an issue as he still requires soft restraints and if anything seems a bit more sedated today as opposed to yesterday with the initiation of Zyprexa given past history of schizophrenia and seizure as psychiatry has given us assistance as well.  Greatly appreciate assistance and input from both pulmonary and psychiatry    UTI could be a contributory factor to metabolic encephalopathy and he is on Rocephin    Concern for possible metastatic disease to the brain if we are indeed dealing with  lung cancer is also a concerning issue for metabolic encephalopathy.  CT of the head was negative and we can consider MRI of the brain in the near future    Questionable alcohol abuse or use prior to admission given 2-1 AST to ALT ratio also concerning for possible contribution to metabolic encephalopathy though no obvious signs of DTs present otherwise   -Statin continued   -Serial CMP stable-normal bilirubin    Hypernatremia persistent and this again can contribute to confusion and I adjusted fluid yesterday to half-normal saline and today I would adjust to D5 water.  Osmolality elevated and will trend sodium in a.m.    ARF resolved with CKD 3B at baseline    A-fib with RVR resolving   -also thoroughly appreciate cardiology input and assistance with this   -Cards considering echo pending mentation -May also need to consider dropping Eliquis and switching to heparin drip versus Lovenox since pulmonary is considering an intervention with biopsy in the near future also pending mental status    HTN stable overall/labile given mood    Yinka Mccoy MD  4/25/2023  11:06 EDT

## 2023-04-25 NOTE — PROGRESS NOTES
"  PROGRESS NOTE  Patient Name: Kodi Tolliver  Age/Sex: 63 y.o. male  : 1960  MRN: [id number]    Date of Admission: 2023  Date of Encounter Visit: 23   LOS: 2 days   Patient Care Team:  Provider, No Known as PCP - General    Chief Complaint: Lung mass    Hospital course: Patient has suspicious lung mass on his CAT scan and patient would benefit from biopsy for tissue diagnosis in order to decide on treatment options.  Patient has been confused due to his underlying psychological disease and I was unable to discuss that issue with him for the last couple of days         REVIEW OF SYSTEMS:   CONSTITUTIONAL: no fever or chills  Limited system review    Ventilator/Non-Invasive Ventilation Settings (From admission, onward)    None            Vital Signs  Temp:  [97.8 °F (36.6 °C)] 97.8 °F (36.6 °C)  Heart Rate:  [65-91] 91  Resp:  [18] 18  BP: ()/(63-85) 147/85  SpO2:  [91 %-100 %] 95 %  on  Flow (L/min):  [4] 4 Device (Oxygen Therapy): nasal cannula    Intake/Output Summary (Last 24 hours) at 2023 0931  Last data filed at 2023 1316  Gross per 24 hour   Intake 240 ml   Output 1200 ml   Net -960 ml     Flowsheet Rows    Flowsheet Row First Filed Value   Admission Height 180.3 cm (71\") Documented at 2023   Admission Weight 68 kg (150 lb) Documented at 2023        Body mass index is 22.23 kg/m².      23   Weight: 68 kg (150 lb) 72.3 kg (159 lb 6.4 oz)       Physical Exam: No significant changes  GEN: Awake, confused, difficult to comprehend speech  EYES:   Sclerae clear. No icterus. PERRL. Normal EOM  ENT:   External ears/nose normal, no oral lesions, no thrush, mucous membranes moist, missing several teeth  NECK:  Supple, midline trachea, no JVD  LUNGS: Normal chest on inspection, CTAB, no wheezes. No rhonchi. No crackles. Respirations regular, even and unlabored.  Diminished breath sounds CV:  Regular rhythm and rate. Normal S1/S2. No murmurs, " gallops, or rubs noted.  ABD:  Soft, nontender and nondistended. Normal bowel sounds. No guarding  EXT:  Moves all extremities well. No cyanosis. No redness. No edema.   Skin: Dry, intact, no bleeding    Results Review:    Results From Last 14 Days   Lab Units 04/24/23  0447 04/23/23  1821   FERRITIN ng/mL 86.60  --    LACTATE mmol/L  --  1.2     Results from last 7 days   Lab Units 04/25/23  0529 04/24/23  0447 04/23/23  1608   SODIUM mmol/L 148* 147* 148*   POTASSIUM mmol/L 5.1 5.1 4.8   CHLORIDE mmol/L 107 108* 102   CO2 mmol/L 29.9* 30.0* 33.2*   BUN mg/dL 24* 48* 58*   CREATININE mg/dL 1.17 1.88* 2.60*   CALCIUM mg/dL 9.3 8.8 10.1   AST (SGOT) U/L 60*  --  54*   ALT (SGPT) U/L 15  --  16   ANION GAP mmol/L 11.1 9.0 12.8   ALBUMIN g/dL 3.3* 3.7 4.7     Results from last 7 days   Lab Units 04/23/23  1931 04/23/23  1608   HSTROP T ng/L 31* 25*             Results from last 7 days   Lab Units 04/24/23  0447 04/23/23  1608   WBC 10*3/mm3 10.24 10.78   HEMOGLOBIN g/dL 9.2* 10.2*   HEMATOCRIT % 29.4* 33.6*   PLATELETS 10*3/mm3 267 329   MCV fL 81.7 82.4   NEUTROPHIL % %  --  77.4*   LYMPHOCYTE % %  --  13.0*   MONOCYTES % %  --  8.2   EOSINOPHIL % %  --  0.6   BASOPHIL % %  --  0.4   IMM GRAN % %  --  0.4     Results from last 7 days   Lab Units 04/23/23  1720   INR  1.27*   APTT seconds 33.8               Invalid input(s): LDLCALC          Glucose   Date/Time Value Ref Range Status   04/24/2023 1138 90 70 - 130 mg/dL Final     Comment:     Meter: [id number] : [id number] Karely Rowleynadine CALVERT   04/23/2023 2340 98 70 - 130 mg/dL Final     Comment:     Meter: [id number] : [id number] Ankit Cagle NA     Results from last 7 days   Lab Units 04/23/23  1821   LACTATE mmol/L 1.2     Results from last 7 days   Lab Units 04/23/23  1931 04/23/23  1821 04/23/23  1619   BLOODCX  No growth at 24 hours No growth at 24 hours  --    URINECX   --   --  No growth     Results from last 7 days   Lab Units 04/23/23  1619   NITRITE UA   Positive*   WBC UA /HPF Too Numerous to Count*   BACTERIA UA /HPF 4+*   SQUAM EPITHEL UA /HPF 0-2   URINECX  No growth         Results from last 7 days   Lab Units 04/23/23  1931   URIC ACID mg/dL 8.5*           Imaging:   Imaging Results (All)     Procedure Component Value Units Date/Time        I reviewed the patient's new clinical results.  I personally viewed and interpreted the patient's imaging results:        Medication Review:   apixaban, 5 mg, Oral, BID  atorvastatin, 20 mg, Oral, Daily  cefTRIAXone, 2 g, Intravenous, Q24H  docusate sodium, 100 mg, Oral, BID  levETIRAcetam, 500 mg, Oral, BID  metoprolol tartrate, 25 mg, Oral, Nightly  metoprolol tartrate, 50 mg, Oral, Daily  OLANZapine, 10 mg, Oral, BID  pantoprazole, 40 mg, Oral, Q AM  senna-docusate sodium, 2 tablet, Oral, BID  Valproic Acid, 250 mg, Oral, Daily        dextrose, 100 mL/hr  dilTIAZem, 5-10 mg/hr, Last Rate: Stopped (04/24/23 1504)        ASSESSMENT:   1. Left lung mass, concerning for malignancy  2. Acute kidney injury, resolving  3. Metabolic acidosis  4. Schizophrenia  5. Anemia  6. Mild hypernatremia    PLAN:  Patient is a bronchoscopy however unable to get a consent from the patient, awaiting psych evaluation  I did call and discussed the case with his Sister Katie Maurer, and I did bring up the issue of treating lung cancer if we were to diagnose him with 1.  If they are considering treatment then it would be worth proceeding with a bronchoscopy and the lung tissue biopsy.  She wanted to consult with her brother before her final answer.  We agreed that I will call them again tomorrow around this time to discuss and address any questions they have regarding the bronchoscopy and then we will schedule him for quarterly  Discussed with nursing staff    Disposition: Per primary team    Kory Bey MD  04/25/23  09:31 EDT            Dictated utilizing Dragon dictation

## 2023-04-25 NOTE — PROGRESS NOTES
"Nutrition Services    Patient Name:  Kodi Tolliver  YOB: 1960  MRN: [id number]  Admit Date:  4/23/2023    Assessment Date:  04/25/23    Comment: Nutrition assessment initiated due to pressure injury.  Pt here with Acute prostatitis/cystitis without hematuria, PAF, Acute metabolic encephalopathy, MARYJANE, COPD, lung mass, Hx - CKD, Schizophrenia.  He is confused, not family at bedside. Po intake has been poor ~25% of his meals so far.  Weight looks to be down as well. He was weighing ~177lb in 9/2022.  Now ~160lbs.     Labs, meds, skin photos reviewed. Na+ 148. Free water deficit calculated at 2.5L. MD has ordered D5W@100mL/hr. No BM yet, stool softeners ordered.    Will add some boost and see if he will drink it. Follow clinical course, nutrition needs.    CLINICAL NUTRITION ASSESSMENT      Reason for Assessment Pressure Injury and/or Non-Healing Wound     Diagnosis/Problem   Acute prostatitis/cystitis without hematuria, PAF, Acute metabolic encephalopathy, MARYJANE, COPD, lung mass, CKD, Schizophrenia   Medical/Surgical History Past Medical History:   Diagnosis Date   • Anxiety    • Aphonia    • Atrial fibrillation    • CHF (congestive heart failure)    • COPD (chronic obstructive pulmonary disease)    • Depression    • Essential hypertension    • Schizophrenia        History reviewed. No pertinent surgical history.     Encounter Information        Nutrition History:  Unknown, pt confused, no family at bedside   Food Preferences:    Supplements:    Factors Affecting Intake: altered mental status, decreased appetite     Anthropometrics        Current Height  Current Weight  BMI kg/m2 Height: 180.3 cm (71\")  Weight: 72.3 kg (159 lb 6.4 oz) (04/23/23 2202)  Body mass index is 22.23 kg/m².   Adjusted BMI (if applicable)        Admission Weight 72.3kg       Ideal Body Weight (IBW) 75.3kg   Adjusted IBW (if applicable)        Usual Body Weight (UBW) 80.5kg 9/2022   Weight Change/Trend Loss       Weight History Wt " Readings from Last 30 Encounters:   04/23/23 2202 72.3 kg (159 lb 6.4 oz)   04/23/23 2015 68 kg (150 lb)             Estimated/Assessed Needs       Energy Requirements    Weight for Calculation 72.3kg   Method for Estimation  25 kcal/kg, 30 kcal/kg   EST Needs (kcal/day) 3714-6480       Protein Requirements    Weight for Calculation 72.3kg   EST Protein Needs (g/kg) 1.2 gm/kg   EST Daily Needs (g/day) 87       Fluid Requirements     Method for Estimation Other:     Estimated Needs (mL/day)      Fluid Deficit        Current Na Level (mEq/L) 148  4/25    Desired Na Level (mEq/L) 140    Estimated Fluid Deficit (L) ~2.5L     Tests/Procedures        Tests/Procedures CT scan, X-Ray     Labs       Pertinent Labs    Results from last 7 days   Lab Units 04/25/23  0529 04/24/23 0447 04/23/23  1608   SODIUM mmol/L 148* 147* 148*   POTASSIUM mmol/L 5.1 5.1 4.8   CHLORIDE mmol/L 107 108* 102   CO2 mmol/L 29.9* 30.0* 33.2*   BUN mg/dL 24* 48* 58*   CREATININE mg/dL 1.17 1.88* 2.60*   CALCIUM mg/dL 9.3 8.8 10.1   BILIRUBIN mg/dL 0.3  --  0.4   ALK PHOS U/L 58  --  72   ALT (SGPT) U/L 15  --  16   AST (SGOT) U/L 60*  --  54*   GLUCOSE mg/dL 71 92 111*     Results from last 7 days   Lab Units 04/25/23  0529 04/24/23  0447   PHOSPHORUS mg/dL  --  4.3   HEMOGLOBIN g/dL  --  9.2*   HEMATOCRIT %  --  29.4*   WBC 10*3/mm3  --  10.24   ALBUMIN g/dL 3.3* 3.7     Results from last 7 days   Lab Units 04/24/23  0447 04/23/23  1720 04/23/23  1608   INR   --  1.27*  --    APTT seconds  --  33.8  --    PLATELETS 10*3/mm3 267  --  329     No results found for: COVID19  No results found for: HGBA1C       Medications           Scheduled Medications atorvastatin, 20 mg, Oral, Daily  cefTRIAXone, 2 g, Intravenous, Q24H  docusate sodium, 100 mg, Oral, BID  enoxaparin, 1 mg/kg, Subcutaneous, Q12H  levETIRAcetam, 500 mg, Oral, BID  metoprolol tartrate, 25 mg, Oral, Nightly  metoprolol tartrate, 50 mg, Oral, Daily  OLANZapine, 10 mg, Oral,  BID  pantoprazole, 40 mg, Oral, Q AM  senna-docusate sodium, 2 tablet, Oral, BID  Valproic Acid, 250 mg, Oral, TID       Infusions dextrose, 100 mL/hr  dilTIAZem, 5-10 mg/hr, Last Rate: Stopped (04/24/23 1504)       PRN Medications •  acetaminophen **OR** [DISCONTINUED] acetaminophen **OR** [DISCONTINUED] acetaminophen  •  aluminum-magnesium hydroxide-simethicone  •  senna-docusate sodium **AND** polyethylene glycol **AND** bisacodyl **AND** bisacodyl  •  nitroglycerin  •  OLANZapine  •  ondansetron **OR** ondansetron  •  sodium chloride  •  sodium chloride     Physical Findings          Physical Appearance alert, disoriented, on oxygen therapy speech garbled, dysarthric   Oral/Mouth Cavity teeth missing   Edema  1+ (trace)   Gastrointestinal last bowel movement: pending   Skin  pressure injury gluteal PI   Tubes/Drains none   NFPE Unable to perform due to:  time constraints   --  Current Nutrition Orders & Evaluation of Intake       Oral Nutrition     Food Allergies NKFA   Current PO Diet Diet: Regular/House Diet, Cardiac Diets; Healthy Heart (2-3 Na+); Texture: Soft to Chew (NDD 3); Soft to Chew: Chopped Meat; Fluid Consistency: Thin (IDDSI 0)   Supplement n/a   PO Evaluation     % PO Intake 25%    # of Days Evaluated 2   --  PES STATEMENT / NUTRITION DIAGNOSIS      Nutrition Dx Problem  Problem: Inadequate Nutrient Intake  Etiology: Medical Diagnosis Acute prostatitis/cystitis without hematuria, Acute metabolic encephalopathy, Schizophrenia  Signs/Symptoms: Report of Minimal PO Intake and Unintended Weight Change    Comment:    --  NUTRITION INTERVENTION / PLAN OF CARE      Intervention Goal(s) Maintain nutrition status, Reduce/improve symptoms, Meet estimated needs, Disease management/therapy, Tolerate PO , Increase intake and Maintain weight         RD Intervention/Action Follow Tx Progress, Care plan reviewed and Recommend/ordered:          Prescription/Orders:       PO Diet       Supplements Boost plus tid       Snacks       Enteral Nutrition       Parenteral Nutrition    New Prescription Ordered? Yes   --      Monitor/Evaluation Per protocol   Discharge Plan/Needs Pending clinical course   Education Will instruct as appropriate   --    RD to follow per protocol.    Electronically signed by:  Coreen Soliz RD  04/25/23 13:21 EDT

## 2023-04-26 LAB
ALBUMIN SERPL-MCNC: 3 G/DL (ref 3.5–5.2)
ALBUMIN/GLOB SERPL: 0.8 G/DL
ALP SERPL-CCNC: 63 U/L (ref 39–117)
ALT SERPL W P-5'-P-CCNC: 20 U/L (ref 1–41)
ANION GAP SERPL CALCULATED.3IONS-SCNC: 9 MMOL/L (ref 5–15)
AST SERPL-CCNC: 41 U/L (ref 1–40)
BILIRUB SERPL-MCNC: 0.3 MG/DL (ref 0–1.2)
BUN SERPL-MCNC: 15 MG/DL (ref 8–23)
BUN/CREAT SERPL: 14.3 (ref 7–25)
CALCIUM SPEC-SCNC: 8.9 MG/DL (ref 8.6–10.5)
CHLORIDE SERPL-SCNC: 101 MMOL/L (ref 98–107)
CO2 SERPL-SCNC: 28 MMOL/L (ref 22–29)
CREAT SERPL-MCNC: 1.05 MG/DL (ref 0.76–1.27)
EGFRCR SERPLBLD CKD-EPI 2021: 79.3 ML/MIN/1.73
GLOBULIN UR ELPH-MCNC: 3.6 GM/DL
GLUCOSE SERPL-MCNC: 103 MG/DL (ref 65–99)
POTASSIUM SERPL-SCNC: 5.6 MMOL/L (ref 3.5–5.2)
PROT SERPL-MCNC: 6.6 G/DL (ref 6–8.5)
SODIUM SERPL-SCNC: 138 MMOL/L (ref 136–145)

## 2023-04-26 PROCEDURE — 99232 SBSQ HOSP IP/OBS MODERATE 35: CPT | Performed by: NURSE PRACTITIONER

## 2023-04-26 PROCEDURE — 25010000002 ENOXAPARIN PER 10 MG: Performed by: NURSE PRACTITIONER

## 2023-04-26 PROCEDURE — 80053 COMPREHEN METABOLIC PANEL: CPT | Performed by: HOSPITALIST

## 2023-04-26 PROCEDURE — 92526 ORAL FUNCTION THERAPY: CPT

## 2023-04-26 RX ORDER — ALLOPURINOL 100 MG/1
100 TABLET ORAL DAILY
Status: ON HOLD | COMMUNITY
End: 2023-04-26

## 2023-04-26 RX ORDER — METOPROLOL SUCCINATE 25 MG/1
25 TABLET, EXTENDED RELEASE ORAL DAILY
Status: ON HOLD | COMMUNITY
End: 2023-04-26

## 2023-04-26 RX ORDER — ATORVASTATIN CALCIUM 10 MG/1
10 TABLET, FILM COATED ORAL DAILY
Status: ON HOLD | COMMUNITY
End: 2023-04-26

## 2023-04-26 RX ORDER — ERGOCALCIFEROL 1.25 MG/1
50000 CAPSULE ORAL TAKE AS DIRECTED
COMMUNITY
End: 2023-05-04 | Stop reason: HOSPADM

## 2023-04-26 RX ORDER — FAMOTIDINE 20 MG/1
20 TABLET, FILM COATED ORAL 2 TIMES DAILY
Status: ON HOLD | COMMUNITY
End: 2023-04-26

## 2023-04-26 RX ORDER — CETIRIZINE HYDROCHLORIDE 10 MG/1
10 TABLET ORAL DAILY
Status: ON HOLD | COMMUNITY
End: 2023-04-26

## 2023-04-26 RX ORDER — HYDROCODONE BITARTRATE AND ACETAMINOPHEN 10; 325 MG/1; MG/1
1 TABLET ORAL EVERY 6 HOURS PRN
Status: ON HOLD | COMMUNITY
End: 2023-04-26

## 2023-04-26 RX ORDER — ASPIRIN 81 MG/1
81 TABLET, CHEWABLE ORAL DAILY
Status: ON HOLD | COMMUNITY
End: 2023-04-26

## 2023-04-26 RX ORDER — BUDESONIDE AND FORMOTEROL FUMARATE DIHYDRATE 160; 4.5 UG/1; UG/1
2 AEROSOL RESPIRATORY (INHALATION)
Status: ON HOLD | COMMUNITY
End: 2023-04-26

## 2023-04-26 RX ORDER — IPRATROPIUM BROMIDE 21 UG/1
2 SPRAY, METERED NASAL 2 TIMES DAILY
Status: ON HOLD | COMMUNITY
End: 2023-04-26

## 2023-04-26 RX ORDER — GABAPENTIN 800 MG/1
800 TABLET ORAL 3 TIMES DAILY
Status: ON HOLD | COMMUNITY
End: 2023-04-26

## 2023-04-26 RX ORDER — FUROSEMIDE 10 MG/ML
10 SOLUTION ORAL DAILY
Status: ON HOLD | COMMUNITY
End: 2023-04-26

## 2023-04-26 RX ADMIN — DOCUSATE SODIUM 50MG AND SENNOSIDES 8.6MG 2 TABLET: 8.6; 5 TABLET, FILM COATED ORAL at 21:29

## 2023-04-26 RX ADMIN — LEVETIRACETAM 500 MG: 500 TABLET, FILM COATED ORAL at 08:04

## 2023-04-26 RX ADMIN — DOCUSATE SODIUM 100 MG: 100 CAPSULE, LIQUID FILLED ORAL at 08:04

## 2023-04-26 RX ADMIN — SODIUM ZIRCONIUM CYCLOSILICATE 10 G: 10 POWDER, FOR SUSPENSION ORAL at 16:28

## 2023-04-26 RX ADMIN — ATORVASTATIN CALCIUM 20 MG: 20 TABLET, FILM COATED ORAL at 08:04

## 2023-04-26 RX ADMIN — ENOXAPARIN SODIUM 70 MG: 100 INJECTION SUBCUTANEOUS at 08:04

## 2023-04-26 RX ADMIN — OLANZAPINE 10 MG: 10 TABLET ORAL at 08:04

## 2023-04-26 RX ADMIN — LEVETIRACETAM 500 MG: 500 TABLET, FILM COATED ORAL at 21:29

## 2023-04-26 RX ADMIN — OLANZAPINE 10 MG: 10 TABLET ORAL at 21:29

## 2023-04-26 RX ADMIN — DOCUSATE SODIUM 100 MG: 100 CAPSULE, LIQUID FILLED ORAL at 21:29

## 2023-04-26 RX ADMIN — VALPROIC ACID 250 MG: 250 SOLUTION ORAL at 08:04

## 2023-04-26 RX ADMIN — PANTOPRAZOLE SODIUM 40 MG: 40 TABLET, DELAYED RELEASE ORAL at 05:09

## 2023-04-26 RX ADMIN — DOCUSATE SODIUM 50MG AND SENNOSIDES 8.6MG 2 TABLET: 8.6; 5 TABLET, FILM COATED ORAL at 08:03

## 2023-04-26 RX ADMIN — METOPROLOL TARTRATE 25 MG: 25 TABLET, FILM COATED ORAL at 21:29

## 2023-04-26 RX ADMIN — METOPROLOL TARTRATE 50 MG: 50 TABLET, FILM COATED ORAL at 08:04

## 2023-04-26 RX ADMIN — VALPROIC ACID 250 MG: 250 SOLUTION ORAL at 21:29

## 2023-04-26 RX ADMIN — OLANZAPINE 10 MG: 10 INJECTION, POWDER, FOR SOLUTION INTRAMUSCULAR at 10:39

## 2023-04-26 NOTE — PROGRESS NOTES
"  PROGRESS NOTE  Patient Name: Kodi Tolliver  Age/Sex: 63 y.o. male  : 1960  MRN: [id number]    Date of Admission: 2023  Date of Encounter Visit: 23   LOS: 3 days   Patient Care Team:  Provider, No Known as PCP - General    Chief Complaint: Lung mass    Hospital course: Patient has suspicious lung mass on his CAT scan and patient would benefit from biopsy for tissue diagnosis in order to decide on treatment options.  Patient has been confused due to his underlying psychological disease and I was unable to discuss that issue with him for the last couple of days.  I did discuss it with the sister yesterday and we had a face-to-face discussion this morning and she would like to proceed with a tissue diagnosis after she consulted with the other siblings.  She is the next of kin since the patient is unable to make any decision         REVIEW OF SYSTEMS:   CONSTITUTIONAL: no fever or chills  Limited system review    Ventilator/Non-Invasive Ventilation Settings (From admission, onward)    None            Vital Signs  Temp:  [97.6 °F (36.4 °C)-98.6 °F (37 °C)] 97.8 °F (36.6 °C)  Heart Rate:  [68-96] 69  Resp:  [18] 18  BP: ()/(61-92) 137/92  SpO2:  [94 %-100 %] 100 %  on  Flow (L/min):  [4] 4 Device (Oxygen Therapy): nasal cannula    Intake/Output Summary (Last 24 hours) at 2023 1012  Last data filed at 2023 0801  Gross per 24 hour   Intake 450 ml   Output --   Net 450 ml     Flowsheet Rows    Flowsheet Row First Filed Value   Admission Height 180.3 cm (71\") Documented at 2023   Admission Weight 68 kg (150 lb) Documented at 2023        Body mass index is 22.23 kg/m².      23   Weight: 68 kg (150 lb) 72.3 kg (159 lb 6.4 oz)       Physical Exam: No significant changes  GEN: Awake, confused, difficult to comprehend speech  EYES:   Sclerae clear. No icterus. PERRL. Normal EOM  ENT:   External ears/nose normal, no oral lesions, no thrush, mucous " membranes moist, missing several teeth  NECK:  Supple, midline trachea, no JVD  LUNGS: Normal chest on inspection, CTAB, no wheezes. No rhonchi. No crackles. Respirations regular, even and unlabored.  Diminished breath sounds CV:  Regular rhythm and rate. Normal S1/S2. No murmurs, gallops, or rubs noted.  ABD:  Soft, nontender and nondistended. Normal bowel sounds. No guarding  EXT:  Moves all extremities well. No cyanosis. No redness. No edema.   Skin: Dry, intact, no bleeding    Results Review:    Results From Last 14 Days   Lab Units 04/24/23  0447 04/23/23  1821   FERRITIN ng/mL 86.60  --    LACTATE mmol/L  --  1.2     Results from last 7 days   Lab Units 04/26/23  0536 04/25/23  0529 04/24/23 0447 04/23/23  1608   SODIUM mmol/L 138 148* 147* 148*   POTASSIUM mmol/L 5.6* 5.1 5.1 4.8   CHLORIDE mmol/L 101 107 108* 102   CO2 mmol/L 28.0 29.9* 30.0* 33.2*   BUN mg/dL 15 24* 48* 58*   CREATININE mg/dL 1.05 1.17 1.88* 2.60*   CALCIUM mg/dL 8.9 9.3 8.8 10.1   AST (SGOT) U/L 41* 60*  --  54*   ALT (SGPT) U/L 20 15  --  16   ANION GAP mmol/L 9.0 11.1 9.0 12.8   ALBUMIN g/dL 3.0* 3.3* 3.7 4.7     Results from last 7 days   Lab Units 04/23/23  1931 04/23/23  1608   HSTROP T ng/L 31* 25*             Results from last 7 days   Lab Units 04/24/23  0447 04/23/23  1608   WBC 10*3/mm3 10.24 10.78   HEMOGLOBIN g/dL 9.2* 10.2*   HEMATOCRIT % 29.4* 33.6*   PLATELETS 10*3/mm3 267 329   MCV fL 81.7 82.4   NEUTROPHIL % %  --  77.4*   LYMPHOCYTE % %  --  13.0*   MONOCYTES % %  --  8.2   EOSINOPHIL % %  --  0.6   BASOPHIL % %  --  0.4   IMM GRAN % %  --  0.4     Results from last 7 days   Lab Units 04/23/23  1720   INR  1.27*   APTT seconds 33.8               Invalid input(s): LDLCALC          Glucose   Date/Time Value Ref Range Status   04/24/2023 1138 90 70 - 130 mg/dL Final     Comment:     Meter: [id number] : [id number] Karely CALVERT   04/23/2023 2340 98 70 - 130 mg/dL Final     Comment:     Meter: [id number] :  [id number] Ankit Cagle NA     Results from last 7 days   Lab Units 04/23/23  1821   LACTATE mmol/L 1.2     Results from last 7 days   Lab Units 04/23/23  1931 04/23/23  1821 04/23/23  1619   BLOODCX  No growth at 2 days No growth at 2 days  --    URINECX   --   --  No growth     Results from last 7 days   Lab Units 04/23/23  1619   NITRITE UA  Positive*   WBC UA /HPF Too Numerous to Count*   BACTERIA UA /HPF 4+*   SQUAM EPITHEL UA /HPF 0-2   URINECX  No growth         Results from last 7 days   Lab Units 04/23/23  1931   URIC ACID mg/dL 8.5*           Imaging:   Imaging Results (All)     Procedure Component Value Units Date/Time        I reviewed the patient's new clinical results.  I personally viewed and interpreted the patient's imaging results:        Medication Review:   atorvastatin, 20 mg, Oral, Daily  cefTRIAXone, 2 g, Intravenous, Q24H  docusate sodium, 100 mg, Oral, BID  enoxaparin, 1 mg/kg, Subcutaneous, Q12H  levETIRAcetam, 500 mg, Oral, BID  metoprolol tartrate, 25 mg, Oral, Nightly  metoprolol tartrate, 50 mg, Oral, Daily  OLANZapine, 10 mg, Oral, BID  pantoprazole, 40 mg, Oral, Q AM  senna-docusate sodium, 2 tablet, Oral, BID  Valproic Acid, 250 mg, Oral, TID        dilTIAZem, 5-10 mg/hr, Last Rate: Stopped (04/24/23 1504)        ASSESSMENT:   1. Left lung mass, concerning for malignancy  2. Acute kidney injury, resolving  3. Metabolic acidosis  4. Schizophrenia  5. Anemia  6. Mild hypernatremia  7. A-fib, on oral anticoagulation currently on Lovenox in preparation for the biopsy    PLAN:  Patient needs to have a bronchoscopy, the yield will depend if the narrowing of the left upper airway bronchus to the lingula is obstructed by an intrinsic versus extrinsic mass.  We did discuss the risk of bleeding, we did discuss the risk of anesthesia, the risk of infection and the risk of pneumothorax and the sister is agreeable to proceed  We will try to get him on the schedule for tomorrow  Discussed earlier  with   I will go ahead and hold the anticoagulation after this morning dose and need to be resumed after bronchoscopy, timing will depend on whether patient has any bleeding or not during the procedure    Disposition: Per primary team    Kory Bey MD  04/26/23  10:12 EDT            Dictated utilizing Dragon dictation

## 2023-04-26 NOTE — THERAPY TREATMENT NOTE
Acute Care - Speech Language Pathology   Swallow Treatment Note [facility]     Patient Name: Kodi Tolliver  : 1960  MRN: [id number]  Today's Date: 2023               Admit Date: 2023    Visit Dx:     ICD-10-CM ICD-9-CM   1. Mass of left lung  R91.8 786.6   2. Acute UTI  N39.0 599.0   3. MARYJANE (acute kidney injury)  N17.9 584.9   4. Atrial fibrillation with RVR  I48.91 427.31   5. Altered mental status, unspecified altered mental status type  R41.82 780.97   6. Full code status  Z78.9 V49.89     Patient Active Problem List   Diagnosis   • Paroxysmal atrial fibrillation   • Acute prostatitis/cystitis without hematuria   • Acute metabolic encephalopathy   • MARYJANE (acute kidney injury)   • COPD (chronic obstructive pulmonary disease)   • Chronic respiratory failure with hypoxia and hypercapnia   • Stage 3b chronic kidney disease   • Schizophrenia   • Essential hypertension   • Mass of left lung     Past Medical History:   Diagnosis Date   • Anxiety    • Aphonia    • Atrial fibrillation    • CHF (congestive heart failure)    • COPD (chronic obstructive pulmonary disease)    • Depression    • Essential hypertension    • Schizophrenia      History reviewed. No pertinent surgical history.    SLP Recommendation and Plan                                                                            Plan of Care Reviewed With: patient  Outcome Evaluation: Attempted to see pt for diet tolerance. Pt was confused with mostly unintelligible speech. Pt took sips of thins with straw with no s/s. Pt refused all food trials adamantly. Recommend continue on soft chopped w/ thin as ordered; meds as tolerated. Consider supplements to improve nutrition. ST to follow.      SWALLOW EVALUATION (last 72 hours)     SLP Adult Swallow Evaluation     Row Name 23 1600 23 0900                Rehab Evaluation    Document Type therapy note (daily note)  -AW evaluation  -KA       Subjective Information -- no complaints  -KA        Patient Observations alert;agree to therapy;poorly cooperative  -AW alert;cooperative  -KA       Patient/Family/Caregiver Comments/Observations Pt confused with unintelligible speech.  -AW --       Patient Effort fair  -AW good  -KA       Symptoms Noted During/After Treatment none  -AW none  -KA          General Information    Patient Profile Reviewed -- yes  -KA       Pertinent History Of Current Problem -- prostatis and cystitis, lung mass. Hx of schizophrenia and CVA.  -KA       Current Method of Nutrition -- NPO  -KA       Precautions/Limitations, Vision -- WFL;for purposes of eval  -KA       Precautions/Limitations, Hearing -- WFL;for purposes of eval  -KA       Prior Level of Function-Communication -- motor speech impairment  -KA       Prior Level of Function-Swallowing -- unknown  -KA       Plans/Goals Discussed with -- patient  -KA       Barriers to Rehab -- medically complex  -KA       Patient's Goals for Discharge -- return to PO diet  -KA          Pain    Additional Documentation -- Pain Scale: FACES Pre/Post-Treatment (Group)  -KA          Pain Scale: FACES Pre/Post-Treatment    Pain: FACES Scale, Pretreatment -- 0-->no hurt  -KA       Posttreatment Pain Rating -- 0-->no hurt  -KA          Oral Motor Structure and Function    Dentition Assessment -- missing teeth  -KA       Secretion Management -- WNL/WFL  -KA       Mucosal Quality -- moist, healthy  -KA       Volitional Swallow -- WFL  -KA       Volitional Cough -- weak  -KA          Oral Musculature and Cranial Nerve Assessment    Oral Motor General Assessment -- generalized oral motor weakness;oral labial or buccal impairment  -KA       Oral Labial or Buccal Impairment, Detail, Cranial Nerve VII (Facial): -- left labial droop  -KA       Vocal Impairment, Detail. Cranial Nerve X (Vagus) -- vocal quality abnormality (see comments);other (see comments)  weak, raspy and hoarse vocal quality  -KA          General Eating/Swallowing Observations     Eating/Swallowing Skills -- fed by SLP  -       Positioning During Eating -- upright in bed  -       Utensils Used -- spoon;cup;straw  -       Consistencies Trialed -- soft to chew textures;chopped;mixed consistency;pureed;thin liquids;nectar/syrup-thick liquids  -          Clinical Swallow Eval    Clinical Swallow Evaluation Summary -- Patient presents with hoarse vocal quality and speech barely intelligible. Inconsistently follows commands. Patient demonstrated no overt s/s of pen/asp with ice chips, NTL via spoon and cup, thins via spoon, cup and straw, puree and mechanical soft mixed consistency, and meds with puree. Laryngeal elevation felt adequate upon neck palpation. Mastication WNL for soft solids even with limited dentition.  -          SLP Evaluation Clinical Impression    SLP Swallowing Diagnosis -- swallow WFL/no suspected pharyngeal impairment  -       Functional Impact -- risk of aspiration/pneumonia  -       Rehab Potential/Prognosis, Swallowing -- good, to achieve stated therapy goals  -       Swallow Criteria for Skilled Therapeutic Interventions Met -- demonstrates skilled criteria  -          Recommendations    Therapy Frequency (Swallow) -- PRN  -KA       Predicted Duration Therapy Intervention (Days) -- until discharge  -       SLP Diet Recommendation -- soft to chew textures;chopped;thin liquids  -       Recommended Precautions and Strategies -- upright posture during/after eating;small bites of food and sips of liquid  -       Oral Care Recommendations -- Oral Care BID/PRN  -       SLP Rec. for Method of Medication Administration -- meds whole;as tolerated  -       Monitor for Signs of Aspiration -- yes;notify SLP if any concerns  -       Anticipated Discharge Disposition (SLP) -- long term acute care facility  -          Swallow Goals (SLP)    Swallow STGs -- diet tolerance goal selection (SLP)  -       Diet Tolerance Goal Selection (SLP) -- Patient will  tolerate trials of  -KA          (STG) Patient will tolerate trials of    Consistencies Trialed (Tolerate trials) -- soft to chew (chopped) textures;thin liquids  -KA       Desired Outcome (Tolerate trials) -- without signs/symptoms of aspiration  -KA       Progress/Outcomes (Tolerate trials) goal ongoing  -AW --       Comment (Tolerate trials) Attempted to see pt for diet tolerance. Pt was confused with mostly unintelligible speech. Pt took sips of thins with straw with no s/s. Pt refused all food trials adamantly. Recommend continue on soft chopped w/ thin as ordered; meds as tolerated. Consider supplements to improve nutrition. ST to follow.  -AW --             User Key  (r) = Recorded By, (t) = Taken By, (c) = Cosigned By    Initials Name Effective Dates    Romero Guthrie MA,CCC-SLP 06/02/22 -     Triny Eduardo, MS CCC-SLP 06/16/21 -                 EDUCATION  The patient has been educated in the following areas:   Dysphagia (Swallowing Impairment) Oral Care/Hydration Modified Diet Instruction.        SLP GOALS     Row Name 04/26/23 1600 04/24/23 0900          (STG) Patient will tolerate trials of    Consistencies Trialed (Tolerate trials) -- soft to chew (chopped) textures;thin liquids  -KA     Desired Outcome (Tolerate trials) -- without signs/symptoms of aspiration  -KA     Progress/Outcomes (Tolerate trials) goal ongoing  -AW --     Comment (Tolerate trials) Attempted to see pt for diet tolerance. Pt was confused with mostly unintelligible speech. Pt took sips of thins with straw with no s/s. Pt refused all food trials adamantly. Recommend continue on soft chopped w/ thin as ordered; meds as tolerated. Consider supplements to improve nutrition. ST to follow.  -AW --           User Key  (r) = Recorded By, (t) = Taken By, (c) = Cosigned By    Initials Name Provider Type    Romero Guthrie MA,CCC-SLP Speech and Language Pathologist    Triny Eduardo, MS CCC-SLP Speech and Language Pathologist                    Time Calculation:    Time Calculation- SLP     Row Name 04/26/23 1636             Time Calculation- SLP    SLP Start Time 1530  -AW      SLP Received On 04/26/23  -AW            User Key  (r) = Recorded By, (t) = Taken By, (c) = Cosigned By    Initials Name Provider Type    Triny Eduardo MS CCC-SLP Speech and Language Pathologist                Therapy Charges for Today     Code Description Service Date Service Provider Modifiers Qty    [id number]  ST TREATMENT SWALLOW 2 4/26/2023 Triny France MS CCC-SLP GN 1               Triny France MS CCC-SLP  4/26/2023

## 2023-04-26 NOTE — PLAN OF CARE
Problem: Restraint, Nonviolent  Goal: Absence of Harm or Injury  Outcome: Ongoing, Progressing  Intervention: Implement Least Restrictive Safety Strategies  Recent Flowsheet Documentation  Taken 4/25/2023 2200 by Hatchett, Chenyl, RN  Medical Device Protection: IV pole/bag removed from visual field  Less Restrictive Alternative:   bed alarm in use   calming techniques promoted   coping techniques promoted  De-Escalation Techniques:   1:1 observation initiated   increased round frequency   reoriented   stimulation decreased   verbally redirected  Diversional Activities: television  Taken 4/25/2023 2000 by Hatchett, Chenyl, RN  Medical Device Protection: IV pole/bag removed from visual field  Less Restrictive Alternative:   bed alarm in use   calming techniques promoted   coping techniques promoted  De-Escalation Techniques:   1:1 observation initiated   increased round frequency   medication administered   reoriented   stimulation decreased   verbally redirected  Diversional Activities: television  Intervention: Protect Dignity, Rights, and Personal Wellbeing  Recent Flowsheet Documentation  Taken 4/25/2023 2000 by Hatchett, Chenyl, RN  Trust Relationship/Rapport: care explained  Intervention: Protect Skin and Joint Integrity  Recent Flowsheet Documentation  Taken 4/25/2023 2200 by Hatchett, Chenyl, RN  Body Position: position changed independently  Taken 4/25/2023 2000 by Hatchett, Chenyl, RN  Body Position: position changed independently   Goal Outcome Evaluation:

## 2023-04-26 NOTE — PROGRESS NOTES
"    Patient Name: Kodi Tolliver  :1960  63 y.o.      Patient Care Team:  Provider, No Known as PCP - General    Chief Complaint: follow up PAF    Interval History:4 point restraints. Asleep but awakens easily. Hard to understand though.         Objective   Vital Signs  Temp:  [97.6 °F (36.4 °C)-98.4 °F (36.9 °C)] 97.8 °F (36.6 °C)  Heart Rate:  [68-96] 69  Resp:  [18] 18  BP: ()/(61-92) 137/92    Intake/Output Summary (Last 24 hours) at 2023 1206  Last data filed at 2023 0801  Gross per 24 hour   Intake 450 ml   Output --   Net 450 ml     Flowsheet Rows    Flowsheet Row First Filed Value   Admission Height 180.3 cm (71\") Documented at 2023   Admission Weight 68 kg (150 lb) Documented at 2023          Physical Exam:   General Appearance:    Somnolent, restrained   Lungs:     Clear to auscultation.  Normal respiratory effort and rate.      Heart:    Regular rhythm and normal rate, normal S1 and S2, no murmurs, gallops or rubs.     Chest Wall:    No abnormalities observed   Abdomen:     Soft, nontender, positive bowel sounds.     Extremities:   no cyanosis, clubbing or edema.  No marked joint deformities.  Adequate musculoskeletal strength.       Results Review:    Results from last 7 days   Lab Units 23  0536   SODIUM mmol/L 138   POTASSIUM mmol/L 5.6*   CHLORIDE mmol/L 101   CO2 mmol/L 28.0   BUN mg/dL 15   CREATININE mg/dL 1.05   GLUCOSE mg/dL 103*   CALCIUM mg/dL 8.9     Results from last 7 days   Lab Units 23  1931 23  1608   HSTROP T ng/L 31* 25*     Results from last 7 days   Lab Units 23  0447   WBC 10*3/mm3 10.24   HEMOGLOBIN g/dL 9.2*   HEMATOCRIT % 29.4*   PLATELETS 10*3/mm3 267     Results from last 7 days   Lab Units 23  1720   INR  1.27*   APTT seconds 33.8                       Medication Review:   atorvastatin, 20 mg, Oral, Daily  cefTRIAXone, 2 g, Intravenous, Q24H  docusate sodium, 100 mg, Oral, BID  levETIRAcetam, 500 mg, Oral, " BID  metoprolol tartrate, 25 mg, Oral, Nightly  metoprolol tartrate, 50 mg, Oral, Daily  OLANZapine, 10 mg, Oral, BID  pantoprazole, 40 mg, Oral, Q AM  senna-docusate sodium, 2 tablet, Oral, BID  sodium zirconium cyclosilicate, 10 g, Oral, Once  Valproic Acid, 250 mg, Oral, TID         dilTIAZem, 5-10 mg/hr, Last Rate: Stopped (04/24/23 1504)        Assessment & Plan   1.  Altered mental status, likely acute metabolic encephalopathy  2.  Acute cystitis and prostatitis  3.  Paroxysmal atrial fibrillation - currently in SR. Anticoagulated with apixaban. Consider echo when mental status improves. Currently cardiac status appears stable.  4.  Acute kidney injury with with stage IIIb chronic kidney disease  5.  Left perihilar mass, concerning for malignancy  6.  Infrarenal AAA (3.8 cm)  7.  Seizure disorder  8.  Schizophrenia  9.  Hypertension  10.  Hypernatremia  11.  History of pulmonary embolism  12.  Chronic diastolic CHF  13.  Multifactorial anemia    pulm planning bronch tomorrow. on lovenox - can be held at their discretion prior to bronch.     BILL Alvarado  [facility]  04/26/23  12:06 EDT

## 2023-04-26 NOTE — PLAN OF CARE
Goal Outcome Evaluation:              Outcome Evaluation: pt is A&Ox2, soft wrist restraints on upper extremities and left lower extremity maitained, pt recieved prn zyprexa this am after becomming aggitated with staff and refusing care. Family updated, md updated, [facility] angel. VSS, will continue to monitor rest of shift.    Problem: Restraint, Nonviolent  Goal: Absence of Harm or Injury  Outcome: Ongoing, Progressing  Intervention: Implement Least Restrictive Safety Strategies  Recent Flowsheet Documentation  Taken 4/26/2023 1600 by Charlie FINNEGAN RN  Medical Device Protection: IV pole/bag removed from visual field  Less Restrictive Alternative:   bed alarm in use   calming techniques promoted   coping techniques promoted  De-Escalation Techniques:   1:1 observation initiated   verbally redirected   medication offered   increased round frequency   diversional activity encouraged  Diversional Activities: television  Taken 4/26/2023 1400 by hCarlie FINNEGAN RN  Medical Device Protection: IV pole/bag removed from visual field  Less Restrictive Alternative:   bed alarm in use   calming techniques promoted   coping techniques promoted  De-Escalation Techniques:   1:1 observation initiated   verbally redirected   stimulation decreased   reoriented   physical activity promoted   increased round frequency   diversional activity encouraged  Diversional Activities: television  Taken 4/26/2023 1200 by Charlie FINNEGAN RN  Medical Device Protection: IV pole/bag removed from visual field  Less Restrictive Alternative:   bed alarm in use   calming techniques promoted   coping techniques promoted  De-Escalation Techniques:   1:1 observation initiated   reoriented   verbally redirected   stimulation decreased   increased round frequency   family involvement requested  Diversional Activities: television  Taken 4/26/2023 1054 by Charlie FINNEGAN RN  Medical Device Protection:  IV pole/bag removed from visual field  Less Restrictive Alternative:   bed alarm in use   calming techniques promoted   coping techniques promoted  De-Escalation Techniques:   1:1 observation initiated   verbally redirected   reoriented   medication offered   family involvement requested   increased round frequency  Diversional Activities: television  Taken 4/26/2023 1000 by Charlie FINNEGAN RN  Medical Device Protection: IV pole/bag removed from visual field  Less Restrictive Alternative:   bed alarm in use   calming techniques promoted   coping techniques promoted  De-Escalation Techniques:   1:1 observation initiated   increased round frequency   reoriented   verbally redirected  Diversional Activities: television  Taken 4/26/2023 0800 by Charlie FINNEGAN RN  Medical Device Protection: IV pole/bag removed from visual field  Less Restrictive Alternative:   bed alarm in use   calming techniques promoted   coping techniques promoted  De-Escalation Techniques: 1:1 observation initiated  Diversional Activities: television  Intervention: Protect Dignity, Rights, and Personal Wellbeing  Recent Flowsheet Documentation  Taken 4/26/2023 0800 by Charlie FINNEGAN RN  Trust Relationship/Rapport:   care explained   thoughts/feelings acknowledged   empathic listening provided   questions encouraged  Intervention: Protect Skin and Joint Integrity  Recent Flowsheet Documentation  Taken 4/26/2023 1600 by Charlie FINNEGAN RN  Body Position: position changed independently  Taken 4/26/2023 1400 by Charlie FINNEGAN RN  Body Position:   position changed independently   supine, legs elevated  Range of Motion: active ROM (range of motion) encouraged  Taken 4/26/2023 1200 by Charlie FINNEGAN RN  Body Position:   position changed independently   supine, legs elevated  Taken 4/26/2023 1000 by Charlie FINNEGAN RN  Body Position:   supine, legs  elevated   position changed independently  Taken 4/26/2023 0800 by Charlie FINNEGAN RN  Body Position: position changed independently  Range of Motion: active ROM (range of motion) encouraged

## 2023-04-26 NOTE — PROGRESS NOTES
"Nutrition Services    Patient Name:  Kodi Tolliver  YOB: 1960  MRN: [id number]  Admit Date:  4/23/2023    FOLLOW UP - CLINICAL NUTRITION    Assessment Date:  04/26/23 Follow up note    Encounter Information         Reason for Encounter Follow up note    Current Issues Acute prostatitis/cystitis without hematuria, PAF, Acute metabolic encephalopathy, MARYJANE, COPD, lung mass, CKD, Schizophrenia     Current Nutrition Orders & Evaluation of Intake       Oral Nutrition     Current PO Diet Diet: Regular/House Diet, Cardiac Diets; Healthy Heart (2-3 Na+); Texture: Soft to Chew (NDD 3); Soft to Chew: Chopped Meat; Fluid Consistency: Thin (IDDSI 0)  NPO Diet NPO Type: Strict NPO   Supplement Boost Plus   PO Evaluation     % PO Intake 75%    # of Days Evaluated     Factors Affecting Intake  altered mental status   --  Anthropometrics          Height    Weight Height: 180.3 cm (71\")  Weight: 72.3 kg (159 lb 6.4 oz) (04/23/23 2202)    BMI kg/m2 Body mass index is 22.23 kg/m².  Normal/Healthy (18.4 - 24.9)    Weight trend Loss     Labs        Pertinent Labs Reviewed, listed below     Results from last 7 days   Lab Units 04/26/23  0536 04/25/23  0529 04/24/23  0447 04/23/23  1608   SODIUM mmol/L 138 148* 147* 148*   POTASSIUM mmol/L 5.6* 5.1 5.1 4.8   CHLORIDE mmol/L 101 107 108* 102   CO2 mmol/L 28.0 29.9* 30.0* 33.2*   BUN mg/dL 15 24* 48* 58*   CREATININE mg/dL 1.05 1.17 1.88* 2.60*   CALCIUM mg/dL 8.9 9.3 8.8 10.1   BILIRUBIN mg/dL 0.3 0.3  --  0.4   ALK PHOS U/L 63 58  --  72   ALT (SGPT) U/L 20 15  --  16   AST (SGOT) U/L 41* 60*  --  54*   GLUCOSE mg/dL 103* 71 92 111*     Results from last 7 days   Lab Units 04/26/23  0536 04/25/23  0529 04/24/23  0447   PHOSPHORUS mg/dL  --   --  4.3   HEMOGLOBIN g/dL  --   --  9.2*   HEMATOCRIT %  --   --  29.4*   WBC 10*3/mm3  --   --  10.24   ALBUMIN g/dL 3.0*   < > 3.7    < > = values in this interval not displayed.     Results from last 7 days   Lab Units 04/24/23  0447 " 04/23/23  1720 04/23/23  1608   INR   --  1.27*  --    APTT seconds  --  33.8  --    PLATELETS 10*3/mm3 267  --  329     No results found for: COVID19  No results found for: HGBA1C       Medications            Scheduled Medications atorvastatin, 20 mg, Oral, Daily  cefTRIAXone, 2 g, Intravenous, Q24H  docusate sodium, 100 mg, Oral, BID  levETIRAcetam, 500 mg, Oral, BID  metoprolol tartrate, 25 mg, Oral, Nightly  metoprolol tartrate, 50 mg, Oral, Daily  OLANZapine, 10 mg, Oral, BID  pantoprazole, 40 mg, Oral, Q AM  senna-docusate sodium, 2 tablet, Oral, BID  sodium zirconium cyclosilicate, 10 g, Oral, Once  Valproic Acid, 250 mg, Oral, TID        Infusions dilTIAZem, 5-10 mg/hr, Last Rate: Stopped (04/24/23 1504)        PRN Medications •  acetaminophen **OR** [DISCONTINUED] acetaminophen **OR** [DISCONTINUED] acetaminophen  •  aluminum-magnesium hydroxide-simethicone  •  senna-docusate sodium **AND** polyethylene glycol **AND** bisacodyl **AND** bisacodyl  •  nitroglycerin  •  OLANZapine  •  ondansetron **OR** ondansetron  •  sodium chloride  •  sodium chloride         --  Physical Findings              Physical Appearance confused, disoriented, on oxygen therapy speech garbled, dysarthric,    Oral/Mouth Cavity teeth missing   Edema  1+ (trace)   Gastrointestinal last bowel movement: pending   Skin  pressure injury gluteal PI picture reviewed   Tubes/Drains none   NFPE no signs of muscle or fat loss       Estimated/Assessed Needs         Energy Requirements     Weight for Calculation 72.3kg   Method for Estimation  25 kcal/kg, 30 kcal/kg   EST Needs (kcal/day) 3289-2691         Protein Requirements     Weight for Calculation 72.3kg   EST Protein Needs (g/kg) 1.2 gm/kg   EST Daily Needs (g/day) 87         Fluid Requirements      Method for Estimation Other:     Estimated Needs (mL/day)         NUTRITION INTERVENTION / PLAN OF CARE  Intervention Goal         Intervention Goal(s) Maintain nutrition status,  Reduce/improve symptoms, Meet estimated needs, Disease management/therapy, Tolerate PO , Maintain weight and PO intake goal %: 75     Nutrition Intervention         RD Action Supplement provided and Follow Tx Progress     Nutrition Prescription         Diet Prescription     Supplement Prescription Boost Plus   EN/PN Prescription    New Prescription Ordered? Yes   --  Monitor/Evaluation        Monitor Per protocol   Discharge Needs Pending clinical course   Education Will instruct as appropriate   --    RD to follow up per protocol.    Electronically signed by:  Noris Connelly RD  04/26/23 14:49 EDT

## 2023-04-26 NOTE — PLAN OF CARE
Goal Outcome Evaluation:  Plan of Care Reviewed With: patient           Outcome Evaluation: Attempted to see pt for diet tolerance. Pt was confused with mostly unintelligible speech. Pt took sips of thins with straw with no s/s. Pt refused all food trials adamantly. Recommend continue on soft chopped w/ thin as ordered; meds as tolerated. Consider supplements to improve nutrition. ST to follow.

## 2023-04-26 NOTE — PROGRESS NOTES
The patient is sleeping soundly today and cannot be awakened for interview.  He remains in soft wrist restraints.  I have noted the the presence of a suspicious lung mass on CT.

## 2023-04-26 NOTE — PROGRESS NOTES
"    DAILY PROGRESS NOTE  [facility]    Patient Identification:  Name: Kodi Tolliver  Age: 63 y.o.  Sex: male  :  1960  MRN: [id number]         Primary Care Physician: Provider, No Known    Subjective:  Interval History: Patient was remaining in a confused state though he can answer some questions correctly but has required soft restraints over the last couple days due to his impulsivity and patient's safety.  He still remains confused as he will do and say completely inappropriate or nonrelevant things.  Discussed case at length with sister at bedside she seems to understand all.  Unfortunately I just learned that the patient's mother had  of lung cancer as well and had metastatic disease to bone and brain.  Patient was calm and redirectable for me.  He was able to shake my hand and try to converse a but it is extremely difficult given the level of hoarseness.    Objective:    Scheduled Meds:atorvastatin, 20 mg, Oral, Daily  cefTRIAXone, 2 g, Intravenous, Q24H  docusate sodium, 100 mg, Oral, BID  enoxaparin, 1 mg/kg, Subcutaneous, Q12H  levETIRAcetam, 500 mg, Oral, BID  metoprolol tartrate, 25 mg, Oral, Nightly  metoprolol tartrate, 50 mg, Oral, Daily  OLANZapine, 10 mg, Oral, BID  pantoprazole, 40 mg, Oral, Q AM  senna-docusate sodium, 2 tablet, Oral, BID  Valproic Acid, 250 mg, Oral, TID      Continuous Infusions:dilTIAZem, 5-10 mg/hr, Last Rate: Stopped (23 1504)        Vital signs in last 24 hours:  Temp:  [97.6 °F (36.4 °C)-98.4 °F (36.9 °C)] 97.8 °F (36.6 °C)  Heart Rate:  [68-96] 69  Resp:  [18] 18  BP: ()/(61-92) 137/92    Intake/Output:    Intake/Output Summary (Last 24 hours) at 2023 1113  Last data filed at 2023 0801  Gross per 24 hour   Intake 450 ml   Output --   Net 450 ml       Exam:  /92 (BP Location: Right arm, Patient Position: Lying)   Pulse 69   Temp 97.8 °F (36.6 °C) (Oral)   Resp 18   Ht 180.3 cm (71\")   Wt 72.3 kg (159 lb 6.4 oz)   SpO2 " 100%   BMI 22.23 kg/m²     General Appearance:    Alert, cooperative for me at the time with redirection, looks older than stated age and still remains confused                          Head:    Normocephalic, without obvious abnormality, atraumatic                           Eyes:    PERRL, conjunctivae/corneas clear, EOM's intact, both eyes                         Throat: Poor dentition; oral mucosa pink and moist                           Neck:   Supple, no nuchal rigidity or JVD                         Lungs:    Clear to auscultation bilaterally, respirations unlabored                 Chest Wall:    No tenderness or deformity                          Heart:  Irregular rate and rhythm, S1 and S2 normal                  Abdomen:     Soft, nontender, bowel sounds active                 Extremities: Soft wrist restraint, no cyanosis or edema                        Pulses:   Pulses palpable in all extremities                  Neurologic:   CNII-XII intact, nothing focal    Data Review:  Labs in chart were reviewed.    Assessment:  Active Hospital Problems    Diagnosis  POA   • **Acute prostatitis/cystitis without hematuria [N30.00]  Yes   • Paroxysmal atrial fibrillation [I48.0]  Yes   • Acute metabolic encephalopathy [G93.41]  Yes   • MARYJANE (acute kidney injury) [N17.9]  Yes   • COPD (chronic obstructive pulmonary disease) [J44.9]  Yes   • Chronic respiratory failure with hypoxia and hypercapnia [J96.11, J96.12]  Yes   • Stage 3b chronic kidney disease [N18.32]  Yes   • Schizophrenia [F20.9]  Yes   • Essential hypertension [I10]  Yes   • Mass of left lung [R91.8]  Yes      Resolved Hospital Problems   No resolved problems to display.       Plan:    Hoarseness with lung mass very concerning for primary lung cancer              -Case discussed with  as planning on bronchoscopy   -Discussed case with sister at bedside    Persistent metabolic encephalopathy with past history of schizophrenia/seizure   -Psychiatry  utilizing Zyprexa/Depakote   -Still requiring soft restraints   -Patient was calm and redirectable at the time of my exam but I was called by RN shortly afterwards and he ended up needing additional soft restraint to lower extremities as he was becoming more combative   -Can consider MRI of brain post bronchoscopy depending on pathology as patient is at an increased risk for possible metastasis if the lesion on the lung is indeed cancerous as presumed   -UTI does not seem to be contributory at this point as patient has been on antibiotics for days with minimal improvement if it was not for the mood stabilizer medications        Questionable alcohol abuse or use prior to admission given 2-1 AST to ALT ratio also concerning for possible contribution to metabolic encephalopathy though no obvious signs of DTs present otherwise              -Statin continued              -Serial CMP stable-normal bilirubin -no plans to further trend     Hypernatremia resolved with change in IVF    Hyperkalemia -mild with improved creatinine so should empirically resolve on own.  Questionable hemolysis?   -No ACE/ARB - trial Lokelma x1     ARF resolved with CKD 3B at baseline     A-fib with RVR resolving              -Cards managing with Eliquis transitioned over to Lovenox    HTN stable overall/labile given mood    Yinka Mccoy MD  4/26/2023  11:13 EDT

## 2023-04-26 NOTE — PROGRESS NOTES
Access reviewed pt chart for follow up. Pt continues confused and in restraints. Pt seen by psychiatrist today. Access will continue to follow.

## 2023-04-27 ENCOUNTER — APPOINTMENT (OUTPATIENT)
Dept: GENERAL RADIOLOGY | Facility: HOSPITAL | Age: 63
End: 2023-04-27
Payer: MEDICARE

## 2023-04-27 ENCOUNTER — ANESTHESIA EVENT (OUTPATIENT)
Dept: GASTROENTEROLOGY | Facility: HOSPITAL | Age: 63
End: 2023-04-27
Payer: MEDICARE

## 2023-04-27 ENCOUNTER — ANESTHESIA (OUTPATIENT)
Dept: GASTROENTEROLOGY | Facility: HOSPITAL | Age: 63
End: 2023-04-27
Payer: MEDICARE

## 2023-04-27 LAB — EOSINOPHIL SPEC QL MICRO: 2 % EOS/100 CELLS (ref 0–0)

## 2023-04-27 PROCEDURE — 88341 IMHCHEM/IMCYTCHM EA ADD ANTB: CPT | Performed by: INTERNAL MEDICINE

## 2023-04-27 PROCEDURE — 87205 SMEAR GRAM STAIN: CPT | Performed by: INTERNAL MEDICINE

## 2023-04-27 PROCEDURE — 94640 AIRWAY INHALATION TREATMENT: CPT

## 2023-04-27 PROCEDURE — 88312 SPECIAL STAINS GROUP 1: CPT | Performed by: INTERNAL MEDICINE

## 2023-04-27 PROCEDURE — 99232 SBSQ HOSP IP/OBS MODERATE 35: CPT | Performed by: NURSE PRACTITIONER

## 2023-04-27 PROCEDURE — 25010000002 PHENYLEPHRINE 10 MG/ML SOLUTION: Performed by: ANESTHESIOLOGY

## 2023-04-27 PROCEDURE — 87205 SMEAR GRAM STAIN: CPT | Performed by: HOSPITALIST

## 2023-04-27 PROCEDURE — 94664 DEMO&/EVAL PT USE INHALER: CPT

## 2023-04-27 PROCEDURE — 25010000002 PROPOFOL 10 MG/ML EMULSION: Performed by: ANESTHESIOLOGY

## 2023-04-27 PROCEDURE — 87116 MYCOBACTERIA CULTURE: CPT | Performed by: INTERNAL MEDICINE

## 2023-04-27 PROCEDURE — 88342 IMHCHEM/IMCYTCHM 1ST ANTB: CPT | Performed by: INTERNAL MEDICINE

## 2023-04-27 PROCEDURE — 71045 X-RAY EXAM CHEST 1 VIEW: CPT

## 2023-04-27 PROCEDURE — 88360 TUMOR IMMUNOHISTOCHEM/MANUAL: CPT | Performed by: INTERNAL MEDICINE

## 2023-04-27 PROCEDURE — 87206 SMEAR FLUORESCENT/ACID STAI: CPT | Performed by: INTERNAL MEDICINE

## 2023-04-27 PROCEDURE — 0B9H8ZX DRAINAGE OF LUNG LINGULA, VIA NATURAL OR ARTIFICIAL OPENING ENDOSCOPIC, DIAGNOSTIC: ICD-10-PCS | Performed by: INTERNAL MEDICINE

## 2023-04-27 PROCEDURE — 0BBH8ZZ EXCISION OF LUNG LINGULA, VIA NATURAL OR ARTIFICIAL OPENING ENDOSCOPIC: ICD-10-PCS | Performed by: INTERNAL MEDICINE

## 2023-04-27 PROCEDURE — 94799 UNLISTED PULMONARY SVC/PX: CPT

## 2023-04-27 PROCEDURE — 94761 N-INVAS EAR/PLS OXIMETRY MLT: CPT

## 2023-04-27 PROCEDURE — 88112 CYTOPATH CELL ENHANCE TECH: CPT | Performed by: INTERNAL MEDICINE

## 2023-04-27 PROCEDURE — 87102 FUNGUS ISOLATION CULTURE: CPT | Performed by: INTERNAL MEDICINE

## 2023-04-27 PROCEDURE — 25010000002 CEFTRIAXONE PER 250 MG: Performed by: NURSE PRACTITIONER

## 2023-04-27 PROCEDURE — 88305 TISSUE EXAM BY PATHOLOGIST: CPT | Performed by: INTERNAL MEDICINE

## 2023-04-27 PROCEDURE — 87071 CULTURE AEROBIC QUANT OTHER: CPT | Performed by: INTERNAL MEDICINE

## 2023-04-27 RX ORDER — PROPOFOL 10 MG/ML
VIAL (ML) INTRAVENOUS AS NEEDED
Status: DISCONTINUED | OUTPATIENT
Start: 2023-04-27 | End: 2023-04-27 | Stop reason: SURG

## 2023-04-27 RX ORDER — GLYCOPYRROLATE 0.2 MG/ML
INJECTION INTRAMUSCULAR; INTRAVENOUS AS NEEDED
Status: DISCONTINUED | OUTPATIENT
Start: 2023-04-27 | End: 2023-04-27 | Stop reason: SURG

## 2023-04-27 RX ORDER — PHENYLEPHRINE HYDROCHLORIDE 10 MG/ML
INJECTION INTRAVENOUS AS NEEDED
Status: DISCONTINUED | OUTPATIENT
Start: 2023-04-27 | End: 2023-04-27 | Stop reason: SURG

## 2023-04-27 RX ORDER — SODIUM CHLORIDE, SODIUM LACTATE, POTASSIUM CHLORIDE, CALCIUM CHLORIDE 600; 310; 30; 20 MG/100ML; MG/100ML; MG/100ML; MG/100ML
INJECTION, SOLUTION INTRAVENOUS CONTINUOUS PRN
Status: DISCONTINUED | OUTPATIENT
Start: 2023-04-27 | End: 2023-04-27 | Stop reason: SURG

## 2023-04-27 RX ORDER — LIDOCAINE HYDROCHLORIDE 20 MG/ML
INJECTION, SOLUTION INFILTRATION; PERINEURAL AS NEEDED
Status: DISCONTINUED | OUTPATIENT
Start: 2023-04-27 | End: 2023-04-27 | Stop reason: SURG

## 2023-04-27 RX ORDER — IPRATROPIUM BROMIDE AND ALBUTEROL SULFATE 2.5; .5 MG/3ML; MG/3ML
3 SOLUTION RESPIRATORY (INHALATION)
Status: DISCONTINUED | OUTPATIENT
Start: 2023-04-27 | End: 2023-04-27 | Stop reason: HOSPADM

## 2023-04-27 RX ORDER — SODIUM CHLORIDE 9 MG/ML
30 INJECTION, SOLUTION INTRAVENOUS CONTINUOUS
Status: DISCONTINUED | OUTPATIENT
Start: 2023-04-27 | End: 2023-05-02

## 2023-04-27 RX ADMIN — LIDOCAINE HYDROCHLORIDE 100 MG: 20 INJECTION, SOLUTION INFILTRATION; PERINEURAL at 15:29

## 2023-04-27 RX ADMIN — IPRATROPIUM BROMIDE AND ALBUTEROL SULFATE 3 ML: .5; 3 SOLUTION RESPIRATORY (INHALATION) at 16:09

## 2023-04-27 RX ADMIN — LEVETIRACETAM 500 MG: 500 TABLET, FILM COATED ORAL at 20:28

## 2023-04-27 RX ADMIN — PROPOFOL 150 MG: 10 INJECTION, EMULSION INTRAVENOUS at 15:29

## 2023-04-27 RX ADMIN — PHENYLEPHRINE HYDROCHLORIDE 200 MCG: 10 INJECTION INTRAVENOUS at 15:35

## 2023-04-27 RX ADMIN — OLANZAPINE 10 MG: 10 TABLET ORAL at 20:28

## 2023-04-27 RX ADMIN — PROPOFOL 200 MCG/KG/MIN: 10 INJECTION, EMULSION INTRAVENOUS at 15:29

## 2023-04-27 RX ADMIN — VALPROIC ACID 250 MG: 250 SOLUTION ORAL at 20:28

## 2023-04-27 RX ADMIN — VALPROIC ACID 250 MG: 250 SOLUTION ORAL at 17:56

## 2023-04-27 RX ADMIN — METOPROLOL TARTRATE 25 MG: 25 TABLET, FILM COATED ORAL at 20:28

## 2023-04-27 RX ADMIN — CEFTRIAXONE SODIUM 2 G: 2 INJECTION, POWDER, FOR SOLUTION INTRAMUSCULAR; INTRAVENOUS at 01:06

## 2023-04-27 RX ADMIN — CEFTRIAXONE SODIUM 2 G: 2 INJECTION, POWDER, FOR SOLUTION INTRAMUSCULAR; INTRAVENOUS at 23:26

## 2023-04-27 RX ADMIN — SODIUM CHLORIDE 30 ML/HR: 9 INJECTION, SOLUTION INTRAVENOUS at 14:45

## 2023-04-27 RX ADMIN — PANTOPRAZOLE SODIUM 40 MG: 40 TABLET, DELAYED RELEASE ORAL at 05:41

## 2023-04-27 RX ADMIN — SODIUM CHLORIDE, POTASSIUM CHLORIDE, SODIUM LACTATE AND CALCIUM CHLORIDE: 600; 310; 30; 20 INJECTION, SOLUTION INTRAVENOUS at 15:22

## 2023-04-27 RX ADMIN — GLYCOPYRROLATE 0.2 MG: 0.2 INJECTION INTRAMUSCULAR; INTRAVENOUS at 15:35

## 2023-04-27 NOTE — ANESTHESIA PROCEDURE NOTES
Airway  Urgency: elective    Date/Time: 4/27/2023 3:29 PM  Airway not difficult    General Information and Staff    Patient location during procedure: OR  Anesthesiologist: Jung Chua MD    Indications and Patient Condition  Indications for airway management: airway protection    Preoxygenated: no  MILS maintained throughout  Mask difficulty assessment: 0 - not attempted    Final Airway Details  Final airway type: supraglottic airway      Successful airway: ProSeal  Size 4     Number of attempts at approach: 1  Assessment: lips, teeth, and gum same as pre-op and atraumatic intubation

## 2023-04-27 NOTE — PROGRESS NOTES
Patient with new finding of left hilar mass with narrowing of the bronchus to the lingula and part of the left upper lobe  Malignancy is suspected  Consent was obtained from the sister  Patient has no change in his history since last assessment t, on nasal cannula oxygen  Clear to auscultation  Labs reviewed  Patient is off anticoagulation  Proceed with a bronchoscopy as planned

## 2023-04-27 NOTE — ANESTHESIA POSTPROCEDURE EVALUATION
Patient: Kodi Tolliver    Procedure Summary     Date: 04/27/23 Room / Location:  [facility]    Anesthesia Start: 1522 Anesthesia Stop: 1606    Procedure: BRONCHOSCOPY with brushings and biopsies (Left: Bronchus) Diagnosis:       Mass of left lung      (Mass of left lung [R91.8])    Surgeons: Kory Bey MD Provider: Jung Chua MD    Anesthesia Type: general ASA Status: 3          Anesthesia Type: general    Vitals  Vitals Value Taken Time   /74 04/27/23 1613   Temp     Pulse 112 04/27/23 1613   Resp 20 04/27/23 1613   SpO2 98 % 04/27/23 1613           Post Anesthesia Care and Evaluation    Patient location during evaluation: bedside  Patient participation: complete - patient participated  Level of consciousness: awake  Pain management: adequate    Airway patency: patent  Anesthetic complications: No anesthetic complications  PONV Status: none  Cardiovascular status: acceptable  Respiratory status: acceptable  Hydration status: acceptable  Post Neuraxial Block status: Motor and sensory function returned to baseline

## 2023-04-27 NOTE — CASE MANAGEMENT/SOCIAL WORK
Continued Stay Note  [facility]     Patient Name: Kodi Tolliver  MRN: [id number]  Today's Date: 4/27/2023    Admit Date: 4/23/2023    Plan: Pending Hospital course; pt from [facility]   Discharge Plan     Row Name 04/27/23 1308       Plan    Plan Pending Hospital course; pt from [facility]    Patient/Family in Agreement with Plan other (see comments)    Plan Comments Reviewed clinicals, awaiting bronch to be completed and further testing to determine Hospital course. Pt from [facility], Charisse/Fang is following. Packet in ccp office. Isa REYES/CCP               Discharge Codes    No documentation.               Expected Discharge Date and Time     Expected Discharge Date Expected Discharge Time    Apr 27, 2023             Charisse Matias RN

## 2023-04-27 NOTE — PROGRESS NOTES
"    DAILY PROGRESS NOTE  [facility]    Patient Identification:  Name: Kodi Tolliver  Age: 63 y.o.  Sex: male  :  1960  MRN: [id number]         Primary Care Physician: Provider, No Known    Subjective:  Interval History: Patient does not seem encephalopathic as opposed to seeing demented to me at this point.  Speech is also very difficult to discern secondary to his severe hoarseness but I could make out the fact that all he really wants right now is a cheeseburger.  History and ROS not reliable    Objective: No family present at bedside.  Patient's mentation remains as it has for days.  Case discussed with managing RN    Scheduled Meds:atorvastatin, 20 mg, Oral, Daily  cefTRIAXone, 2 g, Intravenous, Q24H  docusate sodium, 100 mg, Oral, BID  levETIRAcetam, 500 mg, Oral, BID  metoprolol tartrate, 25 mg, Oral, Nightly  metoprolol tartrate, 50 mg, Oral, Daily  OLANZapine, 10 mg, Oral, BID  pantoprazole, 40 mg, Oral, Q AM  senna-docusate sodium, 2 tablet, Oral, BID  Valproic Acid, 250 mg, Oral, TID      Continuous Infusions:dilTIAZem, 5-10 mg/hr, Last Rate: Stopped (23 1504)        Vital signs in last 24 hours:  Temp:  [97.9 °F (36.6 °C)-98.1 °F (36.7 °C)] 98.1 °F (36.7 °C)  Heart Rate:  [54-96] 93  Resp:  [18] 18  BP: (105-126)/(63-85) 125/85    Intake/Output:    Intake/Output Summary (Last 24 hours) at 2023 1142  Last data filed at 2023 1748  Gross per 24 hour   Intake 210 ml   Output --   Net 210 ml       Exam:  /85 (BP Location: Right arm, Patient Position: Lying)   Pulse 93   Temp 98.1 °F (36.7 °C) (Oral)   Resp 18   Ht 180.3 cm (71\")   Wt 72.3 kg (159 lb 6.4 oz)   SpO2 92%   BMI 22.23 kg/m²     General Appearance:    Alert, cooperative, nontoxic, encephalopathic versus dementia, seems to be unaware of restraints that been present for days                          Head:    Normocephalic, without obvious abnormality, atraumatic                           Eyes:    PERRL, " conjunctivae/corneas clear, EOM's intact, both eyes                         Throat:   Poor dentition; oral mucosa pink and moist                           Neck:   Supple, no rigidity, no JVD                         Lungs:    Clear to auscultation bilaterally, respirations unlabored                         Heart:    Regular rate and rhythm, S1 and S2 normal                  Abdomen:     Soft, nontender, bowel sounds active                 Extremities: Moving all, no cyanosis or edema                        Pulses:   Pulses palpable in all extremities                  Neurologic:   CNII-XII intact     Data Review:  Labs in chart were reviewed.    Assessment:  Active Hospital Problems    Diagnosis  POA   • **Acute prostatitis/cystitis without hematuria [N30.00]  Yes   • Paroxysmal atrial fibrillation [I48.0]  Yes   • Acute metabolic encephalopathy [G93.41]  Yes   • MARYJANE (acute kidney injury) [N17.9]  Yes   • COPD (chronic obstructive pulmonary disease) [J44.9]  Yes   • Chronic respiratory failure with hypoxia and hypercapnia [J96.11, J96.12]  Yes   • Stage 3b chronic kidney disease [N18.32]  Yes   • Schizophrenia [F20.9]  Yes   • Essential hypertension [I10]  Yes   • Mass of left lung [R91.8]  Yes      Resolved Hospital Problems   No resolved problems to display.       Plan:    Bronchoscopy today for presumed lung cancer    Persistent metabolic encephalopathy with past history of schizophrenia/seizure   -Psychiatry following and appreciate recommendations   -Has remained in soft restraints for days   -Consider MRI pending pathology    UTI treated -doubtful this had any impact whatsoever on mentation at this point    Questionable past history of alcohol abuse with 2-1 AST to ALT ratio but no DTs present   -Statin continued    Hyponatremia resolved    Hyperkalemia resolved    ARF resolved with CKD 3B at baseline with a past history of HTN/A-fib with resolved RVR   -Hold ARB   -Status post [place] [id number]    Cardiology managing  before meals with transition to Lovenox temporarily for bridging due to pending bronchoscopy      Long-term prognosis very poor especially if we confirm diagnosis of lung cancer -I question whether or not this patient would even qualify for treatment    Yinka Mccoy MD  4/27/2023  11:42 EDT

## 2023-04-27 NOTE — PROGRESS NOTES
"    Patient Name: Kodi Tolliver  :1960  63 y.o.      Patient Care Team:  Provider, No Known as PCP - General    Chief Complaint: follow up PAF    Interval History: He is more alert. He is calm and reasonable but very difficult to understand what he is saying.     Objective   Vital Signs  Temp:  [97.9 °F (36.6 °C)-98.1 °F (36.7 °C)] 98.1 °F (36.7 °C)  Heart Rate:  [89-96] 89  Resp:  [16-18] 16  BP: (125-141)/(76-85) 141/83    Intake/Output Summary (Last 24 hours) at 2023 1325  Last data filed at 2023 1748  Gross per 24 hour   Intake 210 ml   Output --   Net 210 ml     Flowsheet Rows    Flowsheet Row First Filed Value   Admission Height 180.3 cm (71\") Documented at 2023   Admission Weight 68 kg (150 lb) Documented at 2023          Physical Exam:   General Appearance:    Somnolent, restrained   Lungs:     Clear to auscultation.  Normal respiratory effort and rate.      Heart:    Regular rhythm and normal rate, normal S1 and S2, no murmurs, gallops or rubs.     Chest Wall:    No abnormalities observed   Abdomen:     Soft, nontender, positive bowel sounds.     Extremities:   no cyanosis, clubbing or edema.  No marked joint deformities.  Adequate musculoskeletal strength.       Results Review:    Results from last 7 days   Lab Units 23  0536   SODIUM mmol/L 138   POTASSIUM mmol/L 5.6*   CHLORIDE mmol/L 101   CO2 mmol/L 28.0   BUN mg/dL 15   CREATININE mg/dL 1.05   GLUCOSE mg/dL 103*   CALCIUM mg/dL 8.9     Results from last 7 days   Lab Units 23  1931 23  1608   HSTROP T ng/L 31* 25*     Results from last 7 days   Lab Units 23  0447   WBC 10*3/mm3 10.24   HEMOGLOBIN g/dL 9.2*   HEMATOCRIT % 29.4*   PLATELETS 10*3/mm3 267     Results from last 7 days   Lab Units 23  1720   INR  1.27*   APTT seconds 33.8                       Medication Review:   atorvastatin, 20 mg, Oral, Daily  cefTRIAXone, 2 g, Intravenous, Q24H  docusate sodium, 100 mg, Oral, " BID  levETIRAcetam, 500 mg, Oral, BID  metoprolol tartrate, 25 mg, Oral, Nightly  metoprolol tartrate, 50 mg, Oral, Daily  OLANZapine, 10 mg, Oral, BID  pantoprazole, 40 mg, Oral, Q AM  senna-docusate sodium, 2 tablet, Oral, BID  Valproic Acid, 250 mg, Oral, TID         dilTIAZem, 5-10 mg/hr, Last Rate: Stopped (04/24/23 1504)        Assessment & Plan   1.  Altered mental status, likely acute metabolic encephalopathy  2.  Acute cystitis and prostatitis  3.  Paroxysmal atrial fibrillation - currently in SR. Anticoagulated with apixaban. Consider echo when mental status improves. Currently cardiac status appears stable.  4.  Acute kidney injury with with stage IIIb chronic kidney disease  5.  Left perihilar mass, concerning for malignancy  6.  Infrarenal AAA (3.8 cm)  7.  Seizure disorder  8.  Schizophrenia  9.  Hypertension  10.  Hypernatremia  11.  History of pulmonary embolism  12.  Chronic diastolic CHF  13.  Multifactorial anemia    pulm planning bronch this afternoon . Lovenox on hold.   In SR. continue beta blocker.     BILL Alvarado  [facility]  04/27/23  13:25 EDT

## 2023-04-27 NOTE — PROGRESS NOTES
The patient remains in soft wrist restraints.  He awakens for interview today but his speech is largely nonsensical though he does offer to shake this physician's hand.  He required no as needed Zyprexa last evening, and may be approaching his psychiatric baseline.

## 2023-04-27 NOTE — PLAN OF CARE
Goal Outcome Evaluation:   Patient currently oriented by self . Patient has 3 point restraints. Patient had Bronch surgery today. Patient is currently resting comfortably in bed. TM

## 2023-04-27 NOTE — OP NOTE
Bronchoscopy Procedure Note    Procedure:  1. Bronchoscopy, Diagnostic    Pre-Operative Diagnosis: left lung mass    Post-Operative Diagnosis: Same    Indication: Lung mass for tissue diagnosis    Anesthesia: Monitored Anesthesia Care (MAC)    Procedure Details: Patient was consented for the procedure with all risks and benefits of the procedure explained in detail.  Patient was given the opportunity to ask questions and all concerns were answered.  The bronchocope was inserted into the main airway via the oropharynx. An anatomical survey was done of the main airways and the subsegmental bronchus.  The findings are reported above.  A bronchoalveolar lavage was performed using aliquots of normal saline instilled into the airways then aspirated back.    Findings:  Patient has evidence of tracheal stenosis that correlates to the site of his previous tracheostomy  I was able to pass the scope through that with no difficulty  Mar was sharp  The right lung was inspected and it was normal with no evidence of any abnormal secretion collection or mucosal abnormality or obstruction  The left lung was inspected  Patient has evidence of obstruction in medial subsegment of the left lingula.   BAL was done from the left lingula  Brushing was done and that obstruction seems to have opened up with the brush but I could not see any foreign body retrieved  in the suction container.  Biopsy was done from the same segment  Bleeding was minimal  Patient tolerated the procedure very well  No immediate complication    Estimated Blood Loss:  Minimal           Specimens:  BAL from the left lingula  Transbronchial biopsy of the lung from the left lingula  Brushing from the left lingula                Complications:  None; patient tolerated the procedure well.           Disposition: PACU - hemodynamically stable.      Patient tolerated the procedure well.    While I was in the room and during my examination of the patient I wore gown,  gloves, mask, eye protection and washed my hands before and after the encounter.  Proper enhanced droplet precautions and isolation precautions were taken.    Kory Bey MD  4/27/2023  15:23 EDT

## 2023-04-27 NOTE — ANESTHESIA PREPROCEDURE EVALUATION
Anesthesia Evaluation     Patient summary reviewed and Nursing notes reviewed   NPO Solid Status: > 8 hours  NPO Liquid Status: > 2 hours           Airway   Mallampati: unable to access  Dental    (+) poor dentition    Pulmonary    (+) COPD,   Cardiovascular     Rhythm: irregular  Rate: abnormal    (+) hypertension, dysrhythmias Atrial Fib,       Neuro/Psych  (+) psychiatric history Schizophrenia,    GI/Hepatic/Renal/Endo    (+)   renal disease CRI,     Musculoskeletal     Abdominal    Substance History      OB/GYN          Other                        Anesthesia Plan    ASA 3     general     intravenous induction     Anesthetic plan, risks, benefits, and alternatives have been provided, discussed and informed consent has been obtained with: patient.  Pre-procedure education provided      CODE STATUS:    Code Status (Patient has no pulse and is not breathing): CPR (Attempt to Resuscitate)  Medical Interventions (Patient has pulse or is breathing): Full Support

## 2023-04-27 NOTE — PROGRESS NOTES
Access Center follow up d/t psychosis; this writer reviewed chart and spoke with ERIC Solis. Per RN, patient remains confused and in soft restraints; he was agitated only when changing brief, no as needed Zyprexa needed overnight.  Patient is currently sleeping; psychiatrist, Dr. Cordoba, following. No further needs/concerns noted at this time per RN and/or medical team; [facility] to continue following.

## 2023-04-27 NOTE — PLAN OF CARE
Problem: Restraint, Nonviolent  Goal: Absence of Harm or Injury  Outcome: Ongoing, Progressing  Intervention: Protect Skin and Joint Integrity  Recent Flowsheet Documentation  Taken 4/26/2023 2200 by Hatchett, Chenyl, RN  Body Position: position changed independently  Taken 4/26/2023 2134 by Hatchett, Chenyl, RN  Body Position: position changed independently   Goal Outcome Evaluation:  Plan of Care Reviewed With: patient        Progress: no change

## 2023-04-28 PROBLEM — N30.00 ACUTE CYSTITIS WITHOUT HEMATURIA: Status: RESOLVED | Noted: 2023-04-23 | Resolved: 2023-04-28

## 2023-04-28 LAB
BACTERIA SPEC AEROBE CULT: NORMAL
BACTERIA SPEC AEROBE CULT: NORMAL

## 2023-04-28 PROCEDURE — 99232 SBSQ HOSP IP/OBS MODERATE 35: CPT | Performed by: NURSE PRACTITIONER

## 2023-04-28 PROCEDURE — 25010000002 ENOXAPARIN PER 10 MG: Performed by: INTERNAL MEDICINE

## 2023-04-28 RX ORDER — ENOXAPARIN SODIUM 100 MG/ML
1 INJECTION SUBCUTANEOUS EVERY 12 HOURS
Status: DISCONTINUED | OUTPATIENT
Start: 2023-04-28 | End: 2023-05-02

## 2023-04-28 RX ADMIN — VALPROIC ACID 250 MG: 250 SOLUTION ORAL at 09:31

## 2023-04-28 RX ADMIN — ENOXAPARIN SODIUM 70 MG: 100 INJECTION SUBCUTANEOUS at 10:55

## 2023-04-28 RX ADMIN — METOPROLOL TARTRATE 25 MG: 25 TABLET, FILM COATED ORAL at 22:56

## 2023-04-28 RX ADMIN — ENOXAPARIN SODIUM 70 MG: 100 INJECTION SUBCUTANEOUS at 23:01

## 2023-04-28 RX ADMIN — OLANZAPINE 10 MG: 10 TABLET ORAL at 09:31

## 2023-04-28 RX ADMIN — LEVETIRACETAM 500 MG: 500 TABLET, FILM COATED ORAL at 22:56

## 2023-04-28 RX ADMIN — VALPROIC ACID 250 MG: 250 SOLUTION ORAL at 15:54

## 2023-04-28 RX ADMIN — OLANZAPINE 10 MG: 10 TABLET ORAL at 22:56

## 2023-04-28 RX ADMIN — LEVETIRACETAM 500 MG: 500 TABLET, FILM COATED ORAL at 09:31

## 2023-04-28 RX ADMIN — METOPROLOL TARTRATE 50 MG: 50 TABLET, FILM COATED ORAL at 09:30

## 2023-04-28 RX ADMIN — VALPROIC ACID 250 MG: 250 SOLUTION ORAL at 22:56

## 2023-04-28 RX ADMIN — ATORVASTATIN CALCIUM 20 MG: 20 TABLET, FILM COATED ORAL at 09:30

## 2023-04-28 NOTE — PLAN OF CARE
Goal Outcome Evaluation:  Plan of Care Reviewed With: patient        Progress: improving  Outcome Evaluation: VSS, NVR in place and loosened, patient more cooperative, IV antibiotics continued, will CTM

## 2023-04-28 NOTE — PROGRESS NOTES
"    Patient Name: Kodi Tolliver  :1960  63 y.o.      Patient Care Team:  Provider, No Known as PCP - General    Chief Complaint: follow up PAF    Interval History: remains in sr. Still hard for me to understand. Refused labs earlier.     Objective   Vital Signs  Temp:  [98.1 °F (36.7 °C)-99.4 °F (37.4 °C)] 98.1 °F (36.7 °C)  Heart Rate:  [] 92  Resp:  [16-20] 18  BP: (126-141)/() 131/80    Intake/Output Summary (Last 24 hours) at 2023 1149  Last data filed at 2023 1835  Gross per 24 hour   Intake 420 ml   Output --   Net 420 ml     Flowsheet Rows    Flowsheet Row First Filed Value   Admission Height 180.3 cm (71\") Documented at 20232   Admission Weight 68 kg (150 lb) Documented at 2023          Physical Exam:   General Appearance:    Somnolent, restrained   Lungs:     Clear to auscultation.  Normal respiratory effort and rate.      Heart:    Regular rhythm and normal rate, normal S1 and S2, no murmurs, gallops or rubs.     Chest Wall:    No abnormalities observed   Abdomen:     Soft, nontender, positive bowel sounds.     Extremities:   no cyanosis, clubbing or edema.  No marked joint deformities.  Adequate musculoskeletal strength.       Results Review:    Results from last 7 days   Lab Units 23  0536   SODIUM mmol/L 138   POTASSIUM mmol/L 5.6*   CHLORIDE mmol/L 101   CO2 mmol/L 28.0   BUN mg/dL 15   CREATININE mg/dL 1.05   GLUCOSE mg/dL 103*   CALCIUM mg/dL 8.9     Results from last 7 days   Lab Units 23  1931 23  1608   HSTROP T ng/L 31* 25*     Results from last 7 days   Lab Units 23  0447   WBC 10*3/mm3 10.24   HEMOGLOBIN g/dL 9.2*   HEMATOCRIT % 29.4*   PLATELETS 10*3/mm3 267     Results from last 7 days   Lab Units 23  1720   INR  1.27*   APTT seconds 33.8                       Medication Review:   atorvastatin, 20 mg, Oral, Daily  docusate sodium, 100 mg, Oral, BID  enoxaparin, 1 mg/kg, Subcutaneous, Q12H  levETIRAcetam, 500 mg, " Oral, BID  metoprolol tartrate, 25 mg, Oral, Nightly  metoprolol tartrate, 50 mg, Oral, Daily  OLANZapine, 10 mg, Oral, BID  pantoprazole, 40 mg, Oral, Q AM  senna-docusate sodium, 2 tablet, Oral, BID  Valproic Acid, 250 mg, Oral, TID         dilTIAZem, 5-10 mg/hr, Last Rate: Stopped (04/24/23 1504)  sodium chloride, 30 mL/hr, Last Rate: 30 mL/hr (04/27/23 1445)        Assessment & Plan   1.  Altered mental status, likely acute metabolic encephalopathy  2.  Acute cystitis and prostatitis  3.  Paroxysmal atrial fibrillation - currently in SR. Anticoagulated with apixaban. Consider echo when mental status improves. Currently cardiac status appears stable.  4.  Acute kidney injury with with stage IIIb chronic kidney disease  5.  Left perihilar mass, concerning for malignancy - s/p biopsy   6.  Infrarenal AAA (3.8 cm)  7.  Seizure disorder  8.  Schizophrenia  9.  Hypertension  10.  Hypernatremia  11.  History of pulmonary embolism  12.  Chronic diastolic CHF  13.  Multifactorial anemia    Status post bronchoscopy and biopsy 4/27/23. Cleared to go back to oral anticoagulation from pulm standpoint. Path is still pending. Continue lovenox for now until we are certain no additional invasive testing/procedures planned.     In SR. continue beta blocker.     BILL Alavrado  [facility]  04/28/23  11:49 EDT

## 2023-04-28 NOTE — NURSING NOTE
Unable to obtain labs after 3 attempts due to patient jerking.    Spoke to Dr. Mccoy about MRI. Plan to hear from pulmonology when lung biopsy results.

## 2023-04-28 NOTE — PROGRESS NOTES
"  PROGRESS NOTE  Patient Name: Kodi Tolliver  Age/Sex: 63 y.o. male  : 1960  MRN: [id number]    Date of Admission: 2023  Date of Encounter Visit: 23   LOS: 5 days   Patient Care Team:  Provider, No Known as PCP - General    Chief Complaint: Lung mass    Hospital course: Patient has suspicious lung mass on his CAT scan and underwent bronchoscopy with biopsy on 2023.  There was evidence of endobronchial filling defect however it flushed out after brushing which was unexpected.  Biopsies were taken and we are waiting on the pathology results.  He is on nasal cannula oxygen and currently down to 2 L/min  Patient is followed by Dr. Low niño for his psychiatric issues  Discussed with the family, they might not consider chemotherapy or active treatment but they would like to know if this is cancerous and they we will plan accordingly  Currently in no distress, no hemoptysis      REVIEW OF SYSTEMS:   CONSTITUTIONAL: no fever or chills  Limited system review    Ventilator/Non-Invasive Ventilation Settings (From admission, onward)    None            Vital Signs  Temp:  [98.1 °F (36.7 °C)-99.4 °F (37.4 °C)] 98.1 °F (36.7 °C)  Heart Rate:  [] 92  Resp:  [16-20] 18  BP: (126-141)/() 131/80  SpO2:  [93 %-100 %] 99 %  on  Flow (L/min):  [2-12] 2 Device (Oxygen Therapy): nasal cannula    Intake/Output Summary (Last 24 hours) at 2023 0825  Last data filed at 2023 1835  Gross per 24 hour   Intake 420 ml   Output --   Net 420 ml     Flowsheet Rows    Flowsheet Row First Filed Value   Admission Height 180.3 cm (71\") Documented at 2023   Admission Weight 68 kg (150 lb) Documented at 2023        Body mass index is 22.23 kg/m².      23   Weight: 68 kg (150 lb) 72.3 kg (159 lb 6.4 oz)       Physical Exam: No significant changes  GEN: Awake, confused, difficult to comprehend speech  EYES:   Sclerae clear. No icterus. PERRL. Normal EOM  ENT:   " External ears/nose normal, no oral lesions, no thrush, mucous membranes moist, missing several teeth  NECK:  Supple, midline trachea, no JVD  LUNGS: Normal chest on inspection, CTAB, no wheezes. No rhonchi. No crackles. Respirations regular, even and unlabored.  Diminished breath sounds CV:  Regular rhythm and rate. Normal S1/S2. No murmurs, gallops, or rubs noted.  ABD:  Soft, nontender and nondistended. Normal bowel sounds. No guarding  EXT:  Moves all extremities well. No cyanosis. No redness. No edema.   Skin: Dry, intact, no bleeding    Results Review:    Results From Last 14 Days   Lab Units 04/24/23  0447 04/23/23  1821   FERRITIN ng/mL 86.60  --    LACTATE mmol/L  --  1.2     Results from last 7 days   Lab Units 04/26/23  0536 04/25/23  0529 04/24/23 0447 04/23/23  1608   SODIUM mmol/L 138 148* 147* 148*   POTASSIUM mmol/L 5.6* 5.1 5.1 4.8   CHLORIDE mmol/L 101 107 108* 102   CO2 mmol/L 28.0 29.9* 30.0* 33.2*   BUN mg/dL 15 24* 48* 58*   CREATININE mg/dL 1.05 1.17 1.88* 2.60*   CALCIUM mg/dL 8.9 9.3 8.8 10.1   AST (SGOT) U/L 41* 60*  --  54*   ALT (SGPT) U/L 20 15  --  16   ANION GAP mmol/L 9.0 11.1 9.0 12.8   ALBUMIN g/dL 3.0* 3.3* 3.7 4.7     Results from last 7 days   Lab Units 04/23/23  1931 04/23/23  1608   HSTROP T ng/L 31* 25*             Results from last 7 days   Lab Units 04/24/23  0447 04/23/23  1608   WBC 10*3/mm3 10.24 10.78   HEMOGLOBIN g/dL 9.2* 10.2*   HEMATOCRIT % 29.4* 33.6*   PLATELETS 10*3/mm3 267 329   MCV fL 81.7 82.4   NEUTROPHIL % %  --  77.4*   LYMPHOCYTE % %  --  13.0*   MONOCYTES % %  --  8.2   EOSINOPHIL % %  --  0.6   BASOPHIL % %  --  0.4   IMM GRAN % %  --  0.4     Results from last 7 days   Lab Units 04/23/23  1720   INR  1.27*   APTT seconds 33.8               Invalid input(s): LDLCALC          No results found for: POCGLU  Results from last 7 days   Lab Units 04/23/23  1821   LACTATE mmol/L 1.2     Results from last 7 days   Lab Units 04/23/23  1931 04/23/23  1821  04/23/23  1619   BLOODCX  No growth at 4 days No growth at 4 days  --    URINECX   --   --  No growth     Results from last 7 days   Lab Units 04/23/23  1619   NITRITE UA  Positive*   WBC UA /HPF Too Numerous to Count*   BACTERIA UA /HPF 4+*   SQUAM EPITHEL UA /HPF 0-2   URINECX  No growth         Results from last 7 days   Lab Units 04/27/23  1550 04/23/23  1931   EOSINOPHIL SMEAR % EOS/100 Cells 2*  --    URIC ACID mg/dL  --  8.5*           Imaging:   Imaging Results (All)     Procedure Component Value Units Date/Time        I reviewed the patient's new clinical results.  I personally viewed and interpreted the patient's imaging results:        Medication Review:   atorvastatin, 20 mg, Oral, Daily  docusate sodium, 100 mg, Oral, BID  levETIRAcetam, 500 mg, Oral, BID  metoprolol tartrate, 25 mg, Oral, Nightly  metoprolol tartrate, 50 mg, Oral, Daily  OLANZapine, 10 mg, Oral, BID  pantoprazole, 40 mg, Oral, Q AM  senna-docusate sodium, 2 tablet, Oral, BID  Valproic Acid, 250 mg, Oral, TID        dilTIAZem, 5-10 mg/hr, Last Rate: Stopped (04/24/23 1504)  sodium chloride, 30 mL/hr, Last Rate: 30 mL/hr (04/27/23 1445)        ASSESSMENT:   1. Left lung mass, concerning for malignancy  2. Acute kidney injury, resolving  3. Metabolic acidosis  4. Schizophrenia  5. Anemia  6. Mild hypernatremia  7. A-fib, on oral anticoagulation currently on Lovenox in preparation for the biopsy    PLAN:  Patient n had his procedure on 5/27/2023 and we will waiting on the pathology results  Need to consider other tissue sampling measures if the bronchoscopy from the 27th was inconclusive  Other treatment per the primary team and the consulting team  Okay to go back on the Lovenox or oral anticoagulation from my standpoint      Disposition: Per primary team    Koyr Bey MD  04/28/23  08:25 EDT            Dictated utilizing Dragon dictation

## 2023-04-28 NOTE — PLAN OF CARE
Goal Outcome Evaluation:   Patient restraint 3 point restraints were reordered. Patient is still showing signs of agitation towards staff members. Patient has had 2 BM for current shift. WCTM

## 2023-04-28 NOTE — PROGRESS NOTES
"    DAILY PROGRESS NOTE  [facility]    Patient Identification:  Name: Kodi Tolliver  Age: 63 y.o.  Sex: male  :  1960  MRN: [id number]         Primary Care Physician: Provider, No Known    Subjective:  Interval History: History and ROS not the most reliable but he clinically looks a little better to me today.  He seems more relaxed and I can understand his speech a bit more.  He is accompanied by his sister at bedside.    Objective: Chronically ill in appearance and does not seem acutely ill nor toxic    Scheduled Meds:atorvastatin, 20 mg, Oral, Daily  docusate sodium, 100 mg, Oral, BID  enoxaparin, 1 mg/kg, Subcutaneous, Q12H  levETIRAcetam, 500 mg, Oral, BID  metoprolol tartrate, 25 mg, Oral, Nightly  metoprolol tartrate, 50 mg, Oral, Daily  OLANZapine, 10 mg, Oral, BID  pantoprazole, 40 mg, Oral, Q AM  senna-docusate sodium, 2 tablet, Oral, BID  Valproic Acid, 250 mg, Oral, TID      Continuous Infusions:dilTIAZem, 5-10 mg/hr, Last Rate: Stopped (23 1504)  sodium chloride, 30 mL/hr, Last Rate: 30 mL/hr (23 1445)        Vital signs in last 24 hours:  Temp:  [98.1 °F (36.7 °C)-99.4 °F (37.4 °C)] 98.1 °F (36.7 °C)  Heart Rate:  [] 92  Resp:  [16-20] 18  BP: (126-141)/() 131/80    Intake/Output:    Intake/Output Summary (Last 24 hours) at 2023 1014  Last data filed at 2023 1835  Gross per 24 hour   Intake 420 ml   Output --   Net 420 ml       Exam:  /80 (BP Location: Left arm, Patient Position: Lying)   Pulse 92   Temp 98.1 °F (36.7 °C) (Oral)   Resp 18   Ht 180.3 cm (71\")   Wt 72.3 kg (159 lb 6.4 oz)   SpO2 99%   BMI 22.23 kg/m²     General Appearance:    Alert, severely hoarse                         Throat:   Oral mucosa pink and moist                           Neck:   No JVD                         Lungs:    Clear to auscultation bilaterally, respirations unlabored                 Chest Wall:    No tenderness or deformity                          " Heart:  Tachycardic rate and rhythm, S1 and S2 normal                  Abdomen:     Soft, nontender, bowel sounds active                 Extremities: Remains in soft restraints, moving all, no cyanosis or edema                        Pulses:   Pulses palpable in all extremities                                 Data Review:  Labs in chart were reviewed.    Assessment:  Active Hospital Problems    Diagnosis  POA   • **Mass of left lung [R91.8]  Yes   • Paroxysmal atrial fibrillation [I48.0]  Yes   • Acute metabolic encephalopathy [G93.41]  Yes   • MARYJANE (acute kidney injury) [N17.9]  Yes   • COPD (chronic obstructive pulmonary disease) [J44.9]  Yes   • Chronic respiratory failure with hypoxia and hypercapnia [J96.11, J96.12]  Yes   • Stage 3b chronic kidney disease [N18.32]  Yes   • Schizophrenia [F20.9]  Yes   • Essential hypertension [I10]  Yes      Resolved Hospital Problems    Diagnosis Date Resolved POA   • Acute prostatitis/cystitis without hematuria [N30.00] 04/28/2023 Yes       Plan:    Appreciate pulmonology assistance with bronchoscopy -awaiting pathology results    Persistent metabolic encephalopathy with past history of schizophrenia and seizure   -Remains in soft restraints due to impulsivity   -Psychiatry dosing mood stabilizing medications and appreciate the assistance   -UTI treated -had no impact on the above    Questionable alcohol abuse without DTs    ARF resolved with CKD 3B at baseline    HTN stable -ARB remains on hold    Hyper kalemia treated    A-fib with resolved RVR -current sinus tachycardia noted on monitor -cardiology following and bridged with Lovenox for bronchoscopy so anticipate change back towards oral anticoagulation soon        Long-term prognosis quite poor -Case discussed with sister at bedside as well as with managing RN    Addendum -notified by RN that unable to get labs drawn in this patient secondary to compliance-allowance    Yinka Mccoy MD  4/28/2023  10:14 EDT

## 2023-04-28 NOTE — NURSING NOTE
"Access Center follow-up.  Per RN patient is \"better than he has been\" but still in restraints and kicked at the bed frame when assisting him this morning.  Per chart, no PRN Zyprexa given since 4/26.  Remains on scheduled Zyprexa and Valproic Acid, which was increased per Dr. Cordoba on 4/25.  Upon entering room, patient is awake, his sister is at bedside.  Sister reports patient is typically \"sweet as can be\".  Patient speaks loudly in an indiscernible manner; he smiles and laughs inappropriately and appears confused.  Sister reports he likes music; iInformed about the music channel on TV.        Access following.          "

## 2023-04-28 NOTE — PROGRESS NOTES
The patient is awake and alert and remains in soft wrist restraints.  During attempted interview today, the patient is only capable of unintelligible garbled speech, continuing current treatment for now

## 2023-04-29 LAB
ALBUMIN SERPL-MCNC: 3.7 G/DL (ref 3.5–5.2)
ALBUMIN/GLOB SERPL: 1.2 G/DL
ALP SERPL-CCNC: 54 U/L (ref 39–117)
ALT SERPL W P-5'-P-CCNC: 15 U/L (ref 1–41)
ANION GAP SERPL CALCULATED.3IONS-SCNC: 5 MMOL/L (ref 5–15)
AST SERPL-CCNC: 24 U/L (ref 1–40)
BACTERIA SPEC AEROBE CULT: NORMAL
BILIRUB SERPL-MCNC: 0.2 MG/DL (ref 0–1.2)
BUN SERPL-MCNC: 12 MG/DL (ref 8–23)
BUN/CREAT SERPL: 13 (ref 7–25)
CALCIUM SPEC-SCNC: 8.9 MG/DL (ref 8.6–10.5)
CHLORIDE SERPL-SCNC: 100 MMOL/L (ref 98–107)
CO2 SERPL-SCNC: 37 MMOL/L (ref 22–29)
CREAT SERPL-MCNC: 0.92 MG/DL (ref 0.76–1.27)
DEPRECATED RDW RBC AUTO: 43.7 FL (ref 37–54)
EGFRCR SERPLBLD CKD-EPI 2021: 92.9 ML/MIN/1.73
ERYTHROCYTE [DISTWIDTH] IN BLOOD BY AUTOMATED COUNT: 14.4 % (ref 12.3–15.4)
GLOBULIN UR ELPH-MCNC: 3.2 GM/DL
GLUCOSE SERPL-MCNC: 103 MG/DL (ref 65–99)
GRAM STN SPEC: NORMAL
HCT VFR BLD AUTO: 27.8 % (ref 37.5–51)
HGB BLD-MCNC: 8.4 G/DL (ref 13–17.7)
MAGNESIUM SERPL-MCNC: 1.9 MG/DL (ref 1.6–2.4)
MCH RBC QN AUTO: 25.3 PG (ref 26.6–33)
MCHC RBC AUTO-ENTMCNC: 30.2 G/DL (ref 31.5–35.7)
MCV RBC AUTO: 83.7 FL (ref 79–97)
PLATELET # BLD AUTO: 302 10*3/MM3 (ref 140–450)
PMV BLD AUTO: 9 FL (ref 6–12)
POTASSIUM SERPL-SCNC: 4.3 MMOL/L (ref 3.5–5.2)
PROT SERPL-MCNC: 6.9 G/DL (ref 6–8.5)
RBC # BLD AUTO: 3.32 10*6/MM3 (ref 4.14–5.8)
SODIUM SERPL-SCNC: 142 MMOL/L (ref 136–145)
WBC NRBC COR # BLD: 9.32 10*3/MM3 (ref 3.4–10.8)

## 2023-04-29 PROCEDURE — 83735 ASSAY OF MAGNESIUM: CPT | Performed by: HOSPITALIST

## 2023-04-29 PROCEDURE — 85027 COMPLETE CBC AUTOMATED: CPT | Performed by: HOSPITALIST

## 2023-04-29 PROCEDURE — 99232 SBSQ HOSP IP/OBS MODERATE 35: CPT | Performed by: INTERNAL MEDICINE

## 2023-04-29 PROCEDURE — 25010000002 ENOXAPARIN PER 10 MG: Performed by: INTERNAL MEDICINE

## 2023-04-29 PROCEDURE — 80053 COMPREHEN METABOLIC PANEL: CPT | Performed by: HOSPITALIST

## 2023-04-29 RX ADMIN — ENOXAPARIN SODIUM 70 MG: 100 INJECTION SUBCUTANEOUS at 20:56

## 2023-04-29 RX ADMIN — VALPROIC ACID 250 MG: 250 SOLUTION ORAL at 20:51

## 2023-04-29 RX ADMIN — METOPROLOL TARTRATE 25 MG: 25 TABLET, FILM COATED ORAL at 20:50

## 2023-04-29 RX ADMIN — OLANZAPINE 10 MG: 10 TABLET ORAL at 10:38

## 2023-04-29 RX ADMIN — OLANZAPINE 10 MG: 10 INJECTION, POWDER, FOR SOLUTION INTRAMUSCULAR at 06:12

## 2023-04-29 RX ADMIN — VALPROIC ACID 250 MG: 250 SOLUTION ORAL at 16:33

## 2023-04-29 RX ADMIN — OLANZAPINE 10 MG: 10 INJECTION, POWDER, FOR SOLUTION INTRAMUSCULAR at 16:32

## 2023-04-29 RX ADMIN — ATORVASTATIN CALCIUM 20 MG: 20 TABLET, FILM COATED ORAL at 10:36

## 2023-04-29 RX ADMIN — VALPROIC ACID 250 MG: 250 SOLUTION ORAL at 10:38

## 2023-04-29 RX ADMIN — OLANZAPINE 10 MG: 10 TABLET ORAL at 20:50

## 2023-04-29 RX ADMIN — LEVETIRACETAM 500 MG: 500 TABLET, FILM COATED ORAL at 10:37

## 2023-04-29 RX ADMIN — METOPROLOL TARTRATE 50 MG: 50 TABLET, FILM COATED ORAL at 10:38

## 2023-04-29 RX ADMIN — LEVETIRACETAM 500 MG: 500 TABLET, FILM COATED ORAL at 20:50

## 2023-04-29 NOTE — PROGRESS NOTES
LOS: 6 days   Patient Care Team:  Provider, No Known as PCP - General    Chief Complaint: Follow-up paroxysmal atrial fibrillation, hypertension.    Interval History: Still remains confused, but was pleasantly confused today when I was then.  No reported acute events.  He is in sinus rhythm currently.    Vital Signs:  Temp:  [97.7 °F (36.5 °C)-98.8 °F (37.1 °C)] 97.7 °F (36.5 °C)  Heart Rate:  [59-94] 59  Resp:  [16-20] 20  BP: (116-149)/() 116/97    Intake/Output Summary (Last 24 hours) at 4/29/2023 1500  Last data filed at 4/29/2023 0428  Gross per 24 hour   Intake 220 ml   Output --   Net 220 ml       Physical Exam:   General Appearance:    No acute distress, alert and pleasantly confused today   Lungs:     Clear to auscultation bilaterally     Heart:    Regular rhythm and normal rate.  No murmurs, gallops, or       rubs.   Abdomen:     Soft, nontender, nondistended.    Extremities:   In restraints.  No clubbing, cyanosis, or edema.     Results Review:    Results from last 7 days   Lab Units 04/29/23  1025   SODIUM mmol/L 142   POTASSIUM mmol/L 4.3   CHLORIDE mmol/L 100   CO2 mmol/L 37.0*   BUN mg/dL 12   CREATININE mg/dL 0.92   GLUCOSE mg/dL 103*   CALCIUM mg/dL 8.9     Results from last 7 days   Lab Units 04/23/23  1931 04/23/23  1608   HSTROP T ng/L 31* 25*     Results from last 7 days   Lab Units 04/29/23  1025   WBC 10*3/mm3 9.32   HEMOGLOBIN g/dL 8.4*   HEMATOCRIT % 27.8*   PLATELETS 10*3/mm3 302     Results from last 7 days   Lab Units 04/23/23  1720   INR  1.27*   APTT seconds 33.8         Results from last 7 days   Lab Units 04/29/23  1025   MAGNESIUM mg/dL 1.9           I reviewed the patient's new clinical results.        Assessment:  1.  Altered mental status, likely acute metabolic encephalopathy  2.  Acute cystitis and prostatitis  3.  Paroxysmal atrial fibrillation  4.  Acute kidney injury with with stage IIIb chronic kidney disease  5.  Left perihilar mass, concerning for malignancy  6.   Infrarenal AAA (3.8 cm)  7.  Seizure disorder  8.  Schizophrenia  9.  Hypertension  10.  Hypernatremia  11.  History of pulmonary embolism  12.  Chronic diastolic CHF  13.  Multifactorial anemia    Plan:  -Await bronchoscopy results to see if this is lung cancer.  Currently on Lovenox.  Switch back to oral anticoagulation with Eliquis once biopsy results are final (and it is known that he will not need further invasive testing).    -Sinus rhythm today.  Continue metoprolol 50 mg in the morning and 25 mg in the evening.    -Seems fairly stable from a cardiac standpoint.  Cardiology will sign off for now.  Please call back if needed.    Praneeth Moon MD  04/29/23  15:00 EDT

## 2023-04-29 NOTE — PROGRESS NOTES
"    DAILY PROGRESS NOTE  [facility]    Patient Identification:  Name: Kodi Tolliver  Age: 63 y.o.  Sex: male  :  1960  MRN: [id number]         Primary Care Physician: Provider, No Known    Subjective:  Interval History: Immediately stop the upon entering room and while he has been agitated all week he is always been conversational with me in would allow exam but today he has additional restraint to his lower extremity and he very clearly would not allow me to touch him today he seems a little bit agitated but otherwise was calm.  No family at bedside    Objective:    Scheduled Meds:atorvastatin, 20 mg, Oral, Daily  docusate sodium, 100 mg, Oral, BID  enoxaparin, 1 mg/kg, Subcutaneous, Q12H  levETIRAcetam, 500 mg, Oral, BID  metoprolol tartrate, 25 mg, Oral, Nightly  metoprolol tartrate, 50 mg, Oral, Daily  OLANZapine, 10 mg, Oral, BID  pantoprazole, 40 mg, Oral, Q AM  senna-docusate sodium, 2 tablet, Oral, BID  Valproic Acid, 250 mg, Oral, TID      Continuous Infusions:dilTIAZem, 5-10 mg/hr, Last Rate: Stopped (23 1504)  sodium chloride, 30 mL/hr, Last Rate: 30 mL/hr (23 1445)        Vital signs in last 24 hours:  Temp:  [97.7 °F (36.5 °C)-98.8 °F (37.1 °C)] 97.7 °F (36.5 °C)  Heart Rate:  [76-94] 86  Resp:  [16-20] 18  BP: (126-149)/() 146/111    Intake/Output:    Intake/Output Summary (Last 24 hours) at 2023 0825  Last data filed at 2023 0428  Gross per 24 hour   Intake 220 ml   Output --   Net 220 ml       Exam:  BP (!) 146/111 (BP Location: Left arm, Patient Position: Lying)   Pulse 86   Temp 97.7 °F (36.5 °C) (Oral)   Resp 18   Ht 180.3 cm (71\")   Wt 72.3 kg (159 lb 6.4 oz)   SpO2 94%   BMI 22.23 kg/m²     Unable to examine due to patient behavior and allowance    Data Review:  Labs in chart were reviewed.    Assessment:  Active Hospital Problems    Diagnosis  POA   • **Mass of left lung [R91.8]  Yes   • Paroxysmal atrial fibrillation [I48.0]  Yes   • Acute " metabolic encephalopathy [G93.41]  Yes   • MARYJANE (acute kidney injury) [N17.9]  Yes   • COPD (chronic obstructive pulmonary disease) [J44.9]  Yes   • Chronic respiratory failure with hypoxia and hypercapnia [J96.11, J96.12]  Yes   • Stage 3b chronic kidney disease [N18.32]  Yes   • Schizophrenia [F20.9]  Yes   • Essential hypertension [I10]  Yes      Resolved Hospital Problems    Diagnosis Date Resolved POA   • Acute prostatitis/cystitis without hematuria [N30.00] 04/28/2023 Yes       Plan:    Status post bronchoscopy with pathology still pending given the concern for primary lung cancer    Persistent metabolic encephalopathy with past history of schizophrenia and seizure   -Has remained in soft restraints all week -now has a restraint on lower extremity   -This was the first day the patient would not allow me to even touch him   -Psychiatry following and dosing mood stabilizing medications   -UTI was treated and this had no clinical impact on the above   -Past history of alcohol abuse with no obvious aspects of DTs    CKD 3B at baseline previously but unable to reevaluate potassium or kidney function level secondary to patient refusing lab draws    HTN stable -intermittent elevation secondary to agitation -I would like to reinstate ARB but hesitant until like improved potassium normal and kidney function acceptable    A-fib with resolved RVR -continue on Lovenox for now pending pathology in case any surgical or further intervention is warranted.  If not we will resume p.o. AC at that juncture      Disposition -long-term prognosis very poor.  I am not sure this patient would even qualify for treatment if pathology is positive.  Unless things clinically change in the near future I am worried about changing goals of care to 1 of comfort.  Sister involved in care and discussed with her at bedside yesterday and ultimately we are waiting on final pathology for further recommendations    Yinka Mccoy,  MD  4/29/2023  08:25 EDT

## 2023-04-29 NOTE — NURSING NOTE
"0526    Patient refused lab. Educated on needing labs. Patient told RN and  to \"get the fuck outta here\".     Notified lab to try back later.     0615 patient refused morning protonix 2x. Not directable. Refusing assistance. Uncooperative. Gave IM zyprexa.   "

## 2023-04-29 NOTE — PLAN OF CARE
Problem: Restraint, Nonviolent  Goal: Absence of Harm or Injury  Outcome: Ongoing, Progressing  Intervention: Implement Least Restrictive Safety Strategies  Recent Flowsheet Documentation  Taken 4/29/2023 0600 by Layne Erazo RN  Medical Device Protection: tubing secured  Less Restrictive Alternative:   bed alarm in use   calming techniques promoted   coping techniques promoted   emotional support provided   medication offered   positive reinforcement provided   safety enhancements provided   self-care activities encouraged  De-Escalation Techniques:   increased round frequency   quiet time facilitated   reoriented   stimulation decreased  Diversional Activities:   television   music  Taken 4/29/2023 0400 by Layne Erazo RN  Medical Device Protection:   IV pole/bag removed from visual field   tubing secured  Less Restrictive Alternative:   bed alarm in use   calming techniques promoted   coping techniques promoted   emotional support provided   medication offered   positive reinforcement provided   safety enhancements provided   self-care activities encouraged  De-Escalation Techniques:   appropriate behavior reinforced   reoriented   verbally redirected   stimulation decreased   quiet time facilitated  Diversional Activities:   television   music  Taken 4/29/2023 0200 by Layne Erazo RN  Medical Device Protection: torso covered  Less Restrictive Alternative:   bed alarm in use   calming techniques promoted   coping techniques promoted   emotional support provided   medication offered   positive reinforcement provided   safety enhancements provided   self-care activities encouraged  De-Escalation Techniques:   appropriate behavior reinforced   quiet time facilitated   reoriented  Diversional Activities: television  Taken 4/29/2023 0113 by Layne Erazo RN  Diversional Activities:   music   television  Taken 4/29/2023 0000 by Layne Erazo RN  Medical Device Protection: tubing  secured  Less Restrictive Alternative:   bed alarm in use   calming techniques promoted   coping techniques promoted   emotional support provided   medication offered   positive reinforcement provided   safety enhancements provided   self-care activities encouraged  De-Escalation Techniques:   appropriate behavior reinforced   reoriented   stimulation decreased  Diversional Activities: television  Taken 4/28/2023 2230 by Layne Erazo RN  Medical Device Protection: tubing secured  Diversional Activities:   music   television  Taken 4/28/2023 2200 by Layne Erazo RN  Medical Device Protection: tubing secured  Less Restrictive Alternative:   bed alarm in use   calming techniques promoted   coping techniques promoted   emotional support provided   medication offered   positive reinforcement provided   safety enhancements provided   self-care activities encouraged  De-Escalation Techniques: medication administered  Diversional Activities: television  Taken 4/28/2023 2000 by Layne Erazo RN  Medical Device Protection: tubing secured  Less Restrictive Alternative:   bed alarm in use   calming techniques promoted   coping techniques promoted   emotional support provided   medication offered   positive reinforcement provided   safety enhancements provided   self-care activities encouraged  De-Escalation Techniques:   verbally redirected   reoriented   quiet time facilitated   increased round frequency  Diversional Activities:   television   music  Intervention: Protect Dignity, Rights, and Personal Wellbeing  Recent Flowsheet Documentation  Taken 4/28/2023 2230 by Layne Erazo RN  Trust Relationship/Rapport:   care explained   choices provided   reassurance provided   thoughts/feelings acknowledged   questions answered  Intervention: Protect Skin and Joint Integrity  Recent Flowsheet Documentation  Taken 4/29/2023 0600 by Layne Erazo RN  Body Position: patient/family refused  Taken  4/29/2023 0428 by Layne Erazo RN  Body Position: turned  Taken 4/29/2023 0240 by Layne Erazo RN  Body Position:   patient/family refused   position changed independently  Taken 4/29/2023 0000 by Layne Erazo RN  Body Position: position changed independently  Taken 4/28/2023 2230 by Layne Erazo RN  Body Position: tilted  Taken 4/28/2023 2000 by Layne Erazo RN  Body Position: tilted   Goal Outcome Evaluation:           Progress: no change  Outcome Evaluation: Patient alert to self. Combative and yelling this morning. Placed on dolphin airbed.

## 2023-04-29 NOTE — NURSING NOTE
Pt had an incontinent episode- took 6 staff members to assist pt to roll to change brief and to keep him from fighting against and  hitting staff. PRN zyprexa could not be given as it was given earlier by nightshift.

## 2023-04-29 NOTE — PROGRESS NOTES
[facility]  [phone]  Dr. Timothy Lawson     Subjective:  LOS: 6    Chief Complaint: Lung mass    Patient seen lying in bed.  He was in restraints.  He was speaking however much it was unintelligible.  Spoke with nursing and appears this is his baseline with waxing and waning agitation.    Objective   Vital Signs past 24hrs  Temp range: Temp (24hrs), Av.1 °F (36.7 °C), Min:97.7 °F (36.5 °C), Max:98.8 °F (37.1 °C)    BP range: BP: (126-149)/() 146/111  Pulse range: Heart Rate:  [76-94] 86  Resp rate range: Resp:  [16-20] 18  Device (Oxygen Therapy): nasal cannula;humidifiedFlow (L/min):  [1-3] 1  Oxygen range:SpO2:  [94 %-100 %] 94 %   Mechanical Ventilator:     Physical Exam  Constitutional:       Comments: Confused and unable to understand what patient is saying   HENT:      Head: Normocephalic and atraumatic.   Eyes:      Extraocular Movements: Extraocular movements intact.      Conjunctiva/sclera: Conjunctivae normal.   Cardiovascular:      Rate and Rhythm: Normal rate and regular rhythm.      Heart sounds: No murmur heard.    No friction rub.   Pulmonary:      Effort: Pulmonary effort is normal. No respiratory distress.      Breath sounds: Decreased breath sounds present. No wheezing or rales.      Comments: Breath sounds diminished bilaterally  Abdominal:      General: Bowel sounds are normal.      Palpations: Abdomen is soft.   Skin:     General: Skin is warm and dry.      Findings: No rash.   Neurological:      General: No focal deficit present.      Mental Status: He is alert.   Psychiatric:         Cognition and Memory: Cognition is impaired.         Judgment: Judgment is impulsive and inappropriate.       Results Review:    I have reviewed the laboratory and imaging data since the last note by [facility] physician.  My annotations are noted in assessment and plan.      Result Review:  I have personally reviewed the results from last note by [facility] physician to 2023 10:53 EDT and  agree with these findings:  [x]  Laboratory list / accordion  [x]  Microbiology  [x]  Radiology  [x]  EKG/Telemetry   [x]  Cardiology/Vascular   [x]  Pathology  [x]  Old records  []  Other:    Medication Review:  I have reviewed the current MAR.  My annotations are noted in assessment and plan.    atorvastatin, 20 mg, Oral, Daily  docusate sodium, 100 mg, Oral, BID  enoxaparin, 1 mg/kg, Subcutaneous, Q12H  levETIRAcetam, 500 mg, Oral, BID  metoprolol tartrate, 25 mg, Oral, Nightly  metoprolol tartrate, 50 mg, Oral, Daily  OLANZapine, 10 mg, Oral, BID  pantoprazole, 40 mg, Oral, Q AM  senna-docusate sodium, 2 tablet, Oral, BID  Valproic Acid, 250 mg, Oral, TID        dilTIAZem, 5-10 mg/hr, Last Rate: Stopped (04/24/23 1504)  sodium chloride, 30 mL/hr, Last Rate: 30 mL/hr (04/27/23 1445)      Lines, Drains & Airways     Active LDAs     Name Placement date Placement time Site Days    Peripheral IV 04/23/23 2214 Right Forearm 04/23/23  2214  Forearm  5              No active isolations  Diet Orders (active) (From admission, onward)     Start     Ordered    04/27/23 1712  Diet: Cardiac Diets; Healthy Heart (2-3 Na+); Texture: Soft to Chew (NDD 3); Soft to Chew: Chopped Meat; Fluid Consistency: Thin (IDDSI 0)  Diet Effective Now         04/27/23 1717    04/25/23 1800  Dietary Nutrition Supplements Boost Plus (Ensure Enlive, Ensure Plus)  Daily With Breakfast, Lunch & Dinner       04/25/23 1348                Assessment   1. Left lung mass, concerning for malignancy  2. Acute hypoxic respiratory failure  3. Acute kidney injury, resolving  4. Metabolic acidosis  5. Schizophrenia  6. Anemia  7. Mild hypernatremia  8. A-fib, on oral anticoagulation currently on Lovenox in preparation for the biopsy    Plan  -Awaiting pathology results  -Continue weaning oxygen for goal O2 saturation greater than 92%  -We will continue to follow      THESE ARE NEW MEDICAL PROBLEMS TO ME.    Timothy Lawson DO   04/29/23  10:53 EDT      Part of  this note may be an electronic transcription/translation of spoken language to printed text using the Dragon Dictation System.

## 2023-04-29 NOTE — PROGRESS NOTES
The patient was irritable and required IM Zyprexa after refusing p.o. medication earlier today, but when seen by this physician, in spite of the presence of ongoing restraints, he seems more awake alert Ellis and is aware that he is in the hospital.  Continuing to follow.

## 2023-04-29 NOTE — NURSING NOTE
Access Center follow-up d/t psychosis.    Patient in bilateral wrist restraints and one ankle restraint. Patient cussing and irritable. RN reported she was attempting to give patient PO Zyprexa but the patient was refusing medication. Patient received 10mg IM Zyprexa @ 0612 and nurse reported it was not very effective. The RN reported she received in report sometimes he can go from agitated to cooperative so she is waiting to see if he calms down and will take his PO Zyprexa. Access will follow

## 2023-04-29 NOTE — NURSING NOTE
PRN zyprexa given for agitation- 4 staff members required to change patients brief due to agitation and uncooperation.

## 2023-04-30 PROCEDURE — 25010000002 ENOXAPARIN PER 10 MG: Performed by: INTERNAL MEDICINE

## 2023-04-30 RX ADMIN — VALPROIC ACID 250 MG: 250 SOLUTION ORAL at 21:27

## 2023-04-30 RX ADMIN — PANTOPRAZOLE SODIUM 40 MG: 40 TABLET, DELAYED RELEASE ORAL at 06:14

## 2023-04-30 RX ADMIN — LEVETIRACETAM 500 MG: 500 TABLET, FILM COATED ORAL at 08:41

## 2023-04-30 RX ADMIN — ATORVASTATIN CALCIUM 20 MG: 20 TABLET, FILM COATED ORAL at 08:41

## 2023-04-30 RX ADMIN — METOPROLOL TARTRATE 25 MG: 25 TABLET, FILM COATED ORAL at 21:27

## 2023-04-30 RX ADMIN — OLANZAPINE 10 MG: 10 TABLET ORAL at 08:41

## 2023-04-30 RX ADMIN — METOPROLOL TARTRATE 50 MG: 50 TABLET, FILM COATED ORAL at 08:41

## 2023-04-30 RX ADMIN — VALPROIC ACID 250 MG: 250 SOLUTION ORAL at 15:27

## 2023-04-30 RX ADMIN — LEVETIRACETAM 500 MG: 500 TABLET, FILM COATED ORAL at 21:27

## 2023-04-30 RX ADMIN — OLANZAPINE 10 MG: 10 TABLET ORAL at 21:27

## 2023-04-30 RX ADMIN — VALPROIC ACID 250 MG: 250 SOLUTION ORAL at 08:41

## 2023-04-30 NOTE — NURSING NOTE
Patient attempting to hit RN. Educated patient that he is in a safe place.     Unable to attach pulse oximeter and check telemetry leads at this time.

## 2023-04-30 NOTE — PLAN OF CARE
Problem: Restraint, Nonviolent  Goal: Absence of Harm or Injury  Outcome: Ongoing, Progressing  Intervention: Implement Least Restrictive Safety Strategies  Recent Flowsheet Documentation  Taken 4/30/2023 0400 by Layne Erazo RN  Medical Device Protection: tubing secured  Less Restrictive Alternative:   bed alarm in use   calming techniques promoted   appropriate expression promoted   environment adjusted   medication offered   self-care activities encouraged  De-Escalation Techniques:   appropriate behavior reinforced   reoriented  Diversional Activities: television  Taken 4/30/2023 0200 by Layne Erazo RN  Medical Device Protection: tubing secured  Less Restrictive Alternative:   bed alarm in use   calming techniques promoted   appropriate expression promoted   environment adjusted   medication offered   self-care activities encouraged  De-Escalation Techniques:   diversional activity encouraged   quiet time facilitated   reoriented   stimulation decreased  Diversional Activities: music  Taken 4/30/2023 0000 by Layne Erazo RN  Medical Device Protection: tubing secured  Less Restrictive Alternative:   bed alarm in use   calming techniques promoted   appropriate expression promoted   environment adjusted   medication offered   self-care activities encouraged  De-Escalation Techniques:   appropriate behavior reinforced   quiet time facilitated   reoriented   stimulation decreased  Diversional Activities: television  Taken 4/29/2023 2200 by Layne Erazo RN  Medical Device Protection: tubing secured  Less Restrictive Alternative:   bed alarm in use   calming techniques promoted   appropriate expression promoted   environment adjusted   medication offered   self-care activities encouraged  De-Escalation Techniques:   appropriate behavior reinforced   medication offered   reoriented  Diversional Activities: music  Taken 4/29/2023 2056 by Layne Erazo RN  Medical Device Protection:  tubing secured  Diversional Activities: music  Taken 4/29/2023 2000 by Layne Erazo RN  Medical Device Protection:   IV pole/bag removed from visual field   tubing secured  Less Restrictive Alternative:   bed alarm in use   calming techniques promoted   appropriate expression promoted   environment adjusted   medication offered   self-care activities encouraged  De-Escalation Techniques:   appropriate behavior reinforced   medication offered   reoriented  Diversional Activities:   television   music  Intervention: Protect Dignity, Rights, and Personal Wellbeing  Recent Flowsheet Documentation  Taken 4/29/2023 2056 by Layne Erazo RN  Trust Relationship/Rapport:   care explained   emotional support provided   empathic listening provided   questions answered   questions encouraged  Intervention: Protect Skin and Joint Integrity  Recent Flowsheet Documentation  Taken 4/30/2023 0433 by Layne Erazo RN  Body Position: position changed independently  Taken 4/30/2023 0228 by Layne Erazo RN  Body Position: patient/family refused  Taken 4/30/2023 0200 by Layne Erazo RN  Body Position: position changed independently  Taken 4/30/2023 0017 by Layne Erazo RN  Body Position: position changed independently   Goal Outcome Evaluation:           Progress: no change  Outcome Evaluation: Patient alert to self. Yelling, combative, swearing, hitting, not directable. Requires frequent re-orientation. Patient in bilateral soft wrist restraints and LLE soft restraint. Limit setting used with patient. Intermittently will have lucid moments of pleasant ness; can change demeanor very quickly.

## 2023-04-30 NOTE — NURSING NOTE
Access center follow up.    Patient sitting up in bed eating breakfast. He is alert and oriented x1(name). Per chart patient admitted with acute UTI. He has Hx. of schizophrenia, anxiety, depression, aphonia. Patient speech difficult to understand at times. Overnight patient mood labile, combative, agitated, yelling, refusing care, required frequent re-direction at times without success and required wrist restraints and LLE restraint. Per MD note today patient mood and alertness improved but has been intermittent per nursing. Current medication zyprexa, keppra, depakene, prn zyprexa IM. Patient seen by psychiatrist yesterday. Access following.

## 2023-04-30 NOTE — PROGRESS NOTES
[facility]  [phone]  Dr. Timothy Lawson     Subjective:  LOS: 7    Chief Complaint: Lung mass    Patient appears to have improved mood and alertness today.  This has been waxing and waning per nursing report.  He denied any acute concerns or complaints today.    Objective   Vital Signs past 24hrs  Temp range: Temp (24hrs), Av.5 °F (36.4 °C), Min:97.5 °F (36.4 °C), Max:97.5 °F (36.4 °C)    BP range: BP: (116-139)/(81-97) 133/83  Pulse range: Heart Rate:  [] 95  Resp rate range: Resp:  [18-20] 18  Device (Oxygen Therapy): nasal cannula;humidifiedFlow (L/min):  [2] 2  Oxygen range:SpO2:  [96 %-100 %] 100 %     Physical Exam  Constitutional:       Comments: Confused   HENT:      Head: Normocephalic and atraumatic.   Eyes:      Extraocular Movements: Extraocular movements intact.      Conjunctiva/sclera: Conjunctivae normal.   Cardiovascular:      Rate and Rhythm: Normal rate and regular rhythm.      Heart sounds: No murmur heard.    No friction rub.   Pulmonary:      Effort: Pulmonary effort is normal. No respiratory distress.      Breath sounds: Decreased breath sounds present. No wheezing or rales.   Abdominal:      General: Bowel sounds are normal.      Palpations: Abdomen is soft.   Skin:     General: Skin is warm and dry.      Findings: No rash.   Neurological:      General: No focal deficit present.      Mental Status: He is alert.   Psychiatric:         Mood and Affect: Mood normal.         Behavior: Behavior normal.       Results Review:    I have reviewed the laboratory and imaging data since the last note by [facility] physician.  My annotations are noted in assessment and plan.      Result Review:  I have personally reviewed the results from last note by [facility] physician to 2023 08:31 EDT and agree with these findings:  [x]  Laboratory list / accordion  [x]  Microbiology  [x]  Radiology  [x]  EKG/Telemetry   [x]  Cardiology/Vascular   [x]  Pathology  [x]  Old records  []   Other:    Medication Review:  I have reviewed the current MAR.  My annotations are noted in assessment and plan.    atorvastatin, 20 mg, Oral, Daily  docusate sodium, 100 mg, Oral, BID  enoxaparin, 1 mg/kg, Subcutaneous, Q12H  levETIRAcetam, 500 mg, Oral, BID  metoprolol tartrate, 25 mg, Oral, Nightly  metoprolol tartrate, 50 mg, Oral, Daily  OLANZapine, 10 mg, Oral, BID  pantoprazole, 40 mg, Oral, Q AM  senna-docusate sodium, 2 tablet, Oral, BID  Valproic Acid, 250 mg, Oral, TID        dilTIAZem, 5-10 mg/hr, Last Rate: Stopped (04/24/23 1504)  sodium chloride, 30 mL/hr, Last Rate: 30 mL/hr (04/27/23 1445)      Lines, Drains & Airways     Active LDAs     Name Placement date Placement time Site Days    Peripheral IV 04/23/23 2214 Right Forearm 04/23/23 2214  Forearm  5              No active isolations  Diet Orders (active) (From admission, onward)     Start     Ordered    04/27/23 1712  Diet: Cardiac Diets; Healthy Heart (2-3 Na+); Texture: Soft to Chew (NDD 3); Soft to Chew: Chopped Meat; Fluid Consistency: Thin (IDDSI 0)  Diet Effective Now         04/27/23 1717    04/25/23 1800  Dietary Nutrition Supplements Boost Plus (Ensure Enlive, Ensure Plus)  Daily With Breakfast, Lunch & Dinner       04/25/23 1348                Assessment   1. Left lung mass, concerning for malignancy  2. Acute hypoxic respiratory failure  3. Acute kidney injury, resolving  4. Metabolic acidosis  5. Schizophrenia  6. Anemia  7. Mild hypernatremia  8. A-fib, on oral anticoagulation currently on Lovenox in preparation for the biopsy    Plan  -Awaiting pathology results  - Currently saturating 100% on 2 L  -Continue weaning oxygen for goal O2 saturation greater than 92%  -We will continue to follow      Timothy Lawson DO   04/30/23  08:31 EDT      Part of this note may be an electronic transcription/translation of spoken language to printed text using the Dragon Dictation System.

## 2023-04-30 NOTE — PROGRESS NOTES
"    DAILY PROGRESS NOTE  [facility]    Patient Identification:  Name: Kodi Tolliver  Age: 63 y.o.  Sex: male  :  1960  MRN: [id number]         Primary Care Physician: Provider, No Known    Subjective:  Interval History: Patient seen and more laid back and laughing today.  He was more of what I have been used to see in this week.  Yesterday was a bit more agitated and when he would let me touch him but today I was able to examine him and he actually shook my hand as well.  I still do not believe he understands anything of what is going on with this hospitalization.  Sister is involved but not currently at bedside    Objective:    Scheduled Meds:atorvastatin, 20 mg, Oral, Daily  docusate sodium, 100 mg, Oral, BID  enoxaparin, 1 mg/kg, Subcutaneous, Q12H  levETIRAcetam, 500 mg, Oral, BID  metoprolol tartrate, 25 mg, Oral, Nightly  metoprolol tartrate, 50 mg, Oral, Daily  OLANZapine, 10 mg, Oral, BID  pantoprazole, 40 mg, Oral, Q AM  senna-docusate sodium, 2 tablet, Oral, BID  Valproic Acid, 250 mg, Oral, TID      Continuous Infusions:dilTIAZem, 5-10 mg/hr, Last Rate: Stopped (23 1504)  sodium chloride, 30 mL/hr, Last Rate: 30 mL/hr (23 1445)        Vital signs in last 24 hours:  Temp:  [97.5 °F (36.4 °C)-97.8 °F (36.6 °C)] 97.8 °F (36.6 °C)  Heart Rate:  [] 75  Resp:  [18] 18  BP: (118-139)/(70-83) 118/70    Intake/Output:    Intake/Output Summary (Last 24 hours) at 2023 1234  Last data filed at 2023 0926  Gross per 24 hour   Intake 870 ml   Output --   Net 870 ml       Exam:  /70 (BP Location: Left arm, Patient Position: Lying)   Pulse 75   Temp 97.8 °F (36.6 °C) (Oral)   Resp 18   Ht 180.3 cm (71\")   Wt 72.3 kg (159 lb 6.4 oz)   SpO2 99%   BMI 22.23 kg/m²     General Appearance:    Alert, conversational but severe hoarseness, remains encephalopathic                        Throat:   Oral mucosa pink and moist                           Neck:   No JVD           "               Lungs:    Clear to auscultation bilaterally, respirations unlabored                          Heart:    Regular rate and rhythm, S1 and S2 normal                  Abdomen:     Soft, nontender, bowel sounds active                 Extremities:   Soft restraints, and moving all, no cyanosis or edema                        Pulses:   Pulses palpable in all extremities                                Data Review:  Labs in chart were reviewed.    Assessment:  Active Hospital Problems    Diagnosis  POA   • **Mass of left lung [R91.8]  Yes   • Paroxysmal atrial fibrillation [I48.0]  Yes   • Acute metabolic encephalopathy [G93.41]  Yes   • MARYJANE (acute kidney injury) [N17.9]  Yes   • COPD (chronic obstructive pulmonary disease) [J44.9]  Yes   • Chronic respiratory failure with hypoxia and hypercapnia [J96.11, J96.12]  Yes   • Stage 3b chronic kidney disease [N18.32]  Yes   • Schizophrenia [F20.9]  Yes   • Essential hypertension [I10]  Yes      Resolved Hospital Problems    Diagnosis Date Resolved POA   • Acute prostatitis/cystitis without hematuria [N30.00] 04/28/2023 Yes       Plan:    Status post bronchoscopy this week with pathology pending   -This is unfortunately lung cancer until proven otherwise    Persistent metabolic encephalopathy with past history of schizophrenia and seizure   -Patient is remained in restraints all week   -Dietary following and dosing mood stabilizing meds   -UTI treatment had absolutely no impact on this patient's mentation   -I would not doubt if his pathology is correct, if there is actually metastasis to his brain compounding things.  Unfortunately there is no way you could ever obtain an MRI for this gentleman unless she had complete sedation with anesthesia.  I am not sure it is worthwhile to put this patient through that because if the pathology is positive we will consult oncology and I am not even sure this patient would qualify for any type of treatment to begin with    CKD 3B  at baseline   -Previously refusing labs but ultimately he allowed us to check on the other day and his creatinine is reassuring and his hyperkalemia remains resolved but patient still remains off of ARB and I ultimately question the need as his current blood pressure is only 118/70 and any elevations in pressure always due to lability due to his mood    A-fib with resolved RVR   -AC per cardiology -Lovenox with intentions to switch back to Eliquis pending biopsy result   -Metoprolol/sinus rhythm    Yinka Mccoy MD  4/30/2023  12:34 EDT

## 2023-05-01 ENCOUNTER — APPOINTMENT (OUTPATIENT)
Dept: GENERAL RADIOLOGY | Facility: HOSPITAL | Age: 63
End: 2023-05-01
Payer: MEDICARE

## 2023-05-01 LAB
CYTO UR: NORMAL
LAB AP CASE REPORT: NORMAL
LAB AP CLINICAL INFORMATION: NORMAL
LAB AP DIAGNOSIS COMMENT: NORMAL
PATH REPORT.FINAL DX SPEC: NORMAL
PATH REPORT.GROSS SPEC: NORMAL
VALPROATE SERPL-MCNC: 35 MCG/ML (ref 50–125)

## 2023-05-01 PROCEDURE — 92526 ORAL FUNCTION THERAPY: CPT

## 2023-05-01 PROCEDURE — 71045 X-RAY EXAM CHEST 1 VIEW: CPT

## 2023-05-01 PROCEDURE — 80164 ASSAY DIPROPYLACETIC ACD TOT: CPT | Performed by: SPECIALIST

## 2023-05-01 PROCEDURE — 25010000002 ENOXAPARIN PER 10 MG: Performed by: INTERNAL MEDICINE

## 2023-05-01 RX ADMIN — LEVETIRACETAM 500 MG: 500 TABLET, FILM COATED ORAL at 08:26

## 2023-05-01 RX ADMIN — OLANZAPINE 10 MG: 10 TABLET ORAL at 21:50

## 2023-05-01 RX ADMIN — VALPROIC ACID 250 MG: 250 SOLUTION ORAL at 21:50

## 2023-05-01 RX ADMIN — LEVETIRACETAM 500 MG: 500 TABLET, FILM COATED ORAL at 21:50

## 2023-05-01 RX ADMIN — ENOXAPARIN SODIUM 70 MG: 100 INJECTION SUBCUTANEOUS at 10:26

## 2023-05-01 RX ADMIN — OLANZAPINE 10 MG: 10 TABLET ORAL at 08:26

## 2023-05-01 RX ADMIN — PANTOPRAZOLE SODIUM 40 MG: 40 TABLET, DELAYED RELEASE ORAL at 06:12

## 2023-05-01 RX ADMIN — VALPROIC ACID 250 MG: 250 SOLUTION ORAL at 08:27

## 2023-05-01 RX ADMIN — METOPROLOL TARTRATE 50 MG: 50 TABLET, FILM COATED ORAL at 08:26

## 2023-05-01 RX ADMIN — METOPROLOL TARTRATE 25 MG: 25 TABLET, FILM COATED ORAL at 21:50

## 2023-05-01 RX ADMIN — ATORVASTATIN CALCIUM 20 MG: 20 TABLET, FILM COATED ORAL at 08:27

## 2023-05-01 NOTE — PLAN OF CARE
Goal Outcome Evaluation:  Plan of Care Reviewed With: patient           Outcome Evaluation: Patient seen for swallow re-evaluation/diet tolerance this date. Speech unintelligible. Limited intake due to refusal. Patient tolerated thin via straw x2, puree x1, and mechanical soft x1 with no overt s/sx of aspiration or penetration. Refused further trials. Recommend continue soft, chopped solids with thin liquids as ordered. Medication as tolerated. ST to follow.

## 2023-05-01 NOTE — THERAPY TREATMENT NOTE
Acute Care - Speech Language Pathology   Swallow Treatment Note [facility]     Patient Name: Kodi Tolliver  : 1960  MRN: [id number]  Today's Date: 2023               Admit Date: 2023    Visit Dx:     ICD-10-CM ICD-9-CM   1. Mass of left lung  R91.8 786.6   2. Acute UTI  N39.0 599.0   3. MARYJANE (acute kidney injury)  N17.9 584.9   4. Atrial fibrillation with RVR  I48.91 427.31   5. Altered mental status, unspecified altered mental status type  R41.82 780.97   6. Full code status  Z78.9 V49.89     Patient Active Problem List   Diagnosis   • Paroxysmal atrial fibrillation   • Acute metabolic encephalopathy   • MARYJANE (acute kidney injury)   • COPD (chronic obstructive pulmonary disease)   • Chronic respiratory failure with hypoxia and hypercapnia   • Stage 3b chronic kidney disease   • Schizophrenia   • Essential hypertension   • Mass of left lung     Past Medical History:   Diagnosis Date   • Anxiety    • Aphonia    • Atrial fibrillation    • CHF (congestive heart failure)    • COPD (chronic obstructive pulmonary disease)    • Depression    • Essential hypertension    • Schizophrenia      Past Surgical History:   Procedure Laterality Date   • BRONCHOSCOPY Left 2023    Procedure: BRONCHOSCOPY with brushings and biopsies;  Surgeon: Kory Bey MD;  Location: [facility] ENDOSCOPY;  Service: Pulmonary;  Laterality: Left;  pre- lung mass  post- same       SLP Recommendation and Plan     SLP Diet Recommendation: soft to chew textures, chopped, thin liquids (23)  Recommended Precautions and Strategies: upright posture during/after eating, small bites of food and sips of liquid (23)  SLP Rec. for Method of Medication Administration: meds whole, as tolerated (23)     Monitor for Signs of Aspiration: yes, notify SLP if any concerns (23)  Recommended Diagnostics: reassess via clinical swallow evaluation (23)     Anticipated Discharge Disposition (SLP): long  term acute care facility (05/01/23 1100)     Therapy Frequency (Swallow): PRN (05/01/23 1100)  Predicted Duration Therapy Intervention (Days): until discharge (05/01/23 1100)                                        Plan of Care Reviewed With: patient  Outcome Evaluation: Patient seen for swallow re-evaluation/diet tolerance this date. Speech unintelligible. Limited intake due to refusal. Patient tolerated thin via straw x2, puree x1, and mechanical soft x1 with no overt s/sx of aspiration or penetration. Refused further trials. Recommend continue soft, chopped solids with thin liquids as ordered. Medication as tolerated. ST to follow.      SWALLOW EVALUATION (last 72 hours)     SLP Adult Swallow Evaluation     Row Name 05/01/23 1100                   Rehab Evaluation    Document Type therapy note (daily note)  -SR        Patient Observations alert;cooperative;poorly cooperative  -SR        Patient/Family/Caregiver Comments/Observations No family present. Patient participated in conversation with clinician. Speech mostly unintelligible.  -SR        Patient Effort fair  -SR        Symptoms Noted During/After Treatment none  -SR           General Information    Patient Profile Reviewed yes  -SR           Pain Scale: FACES Pre/Post-Treatment    Pain: FACES Scale, Pretreatment 0-->no hurt  -SR        Posttreatment Pain Rating 0-->no hurt  -SR           Oral Motor Structure and Function    Dentition Assessment missing teeth  -SR        Secretion Management WNL/WFL  -SR        Mucosal Quality moist, healthy  -SR           Oral Musculature and Cranial Nerve Assessment    Oral Motor General Assessment generalized oral motor weakness;oral labial or buccal impairment  -SR        Vocal Impairment, Detail. Cranial Nerve X (Vagus) vocal quality abnormality (see comments);other (see comments)  raspy vocal quality  -SR           Recommendations    Therapy Frequency (Swallow) PRN  -SR        Predicted Duration Therapy Intervention  (Days) until discharge  -SR        SLP Diet Recommendation soft to chew textures;chopped;thin liquids  -SR        Recommended Diagnostics reassess via clinical swallow evaluation  -SR        Recommended Precautions and Strategies upright posture during/after eating;small bites of food and sips of liquid  -SR        Oral Care Recommendations Oral Care BID/PRN  -SR        SLP Rec. for Method of Medication Administration meds whole;as tolerated  -SR        Monitor for Signs of Aspiration yes;notify SLP if any concerns  -SR        Anticipated Discharge Disposition (SLP) long term acute care [facility]  -SR           (STG) Patient will tolerate trials of    Consistencies Trialed (Tolerate trials) soft to chew (chopped) textures;thin liquids  -SR        Desired Outcome (Tolerate trials) without signs/symptoms of aspiration  -SR        Progress/Outcomes (Tolerate trials) goal ongoing  -SR        Comment (Tolerate trials) Patient seen for swallow re-evaluation/diet tolerance this date. Speech unintelligible. Limited intake due to refusal. Patient tolerated thin via straw x2, puree x1, and mechanical soft x1 with no overt s/sx of aspiration or penetration. Refused further trials. Recommend continue soft, chopped solids with thin liquids as ordered. Medication as tolerated. ST to follow.  -SR              User Key  (r) = Recorded By, (t) = Taken By, (c) = Cosigned By    Initials Name Effective Dates    SR Deedee Matamoros CCC-SLP 11/10/22 -                 EDUCATION  The patient has been educated in the following areas:   Dysphagia (Swallowing Impairment) Oral Care/Hydration.        SLP GOALS     Row Name 05/01/23 1100             (STG) Patient will tolerate trials of    Consistencies Trialed (Tolerate trials) soft to chew (chopped) textures;thin liquids  -SR      Desired Outcome (Tolerate trials) without signs/symptoms of aspiration  -SR      Progress/Outcomes (Tolerate trials) goal ongoing  -SR      Comment (Tolerate trials)  Patient seen for swallow re-evaluation/diet tolerance this date. Speech unintelligible. Limited intake due to refusal. Patient tolerated thin via straw x2, puree x1, and mechanical soft x1 with no overt s/sx of aspiration or penetration. Refused further trials. Recommend continue soft, chopped solids with thin liquids as ordered. Medication as tolerated. ST to follow.  -SR            User Key  (r) = Recorded By, (t) = Taken By, (c) = Cosigned By    Initials Name Provider Type    Deedee Garcia CCC-SLP Speech and Language Pathologist                   Time Calculation:    Time Calculation- SLP     Row Name 05/01/23 1143             Time Calculation- SLP    SLP Start Time 1100  -SR      SLP Received On 05/01/23  -            User Key  (r) = Recorded By, (t) = Taken By, (c) = Cosigned By    Initials Name Provider Type    Deedee Garcia CCC-SLP Speech and Language Pathologist                Therapy Charges for Today     Code Description Service Date Service Provider Modifiers Qty    [id number]  ST TREATMENT SWALLOW 2 5/1/2023 Deedee Matamoros CCC-SLP GN 1               YONAS Guallpa  5/1/2023

## 2023-05-01 NOTE — PROGRESS NOTES
[facility]  [phone]  Dr. Kaden Felipe    Subjective:  LOS: 8    Chief Complaint: Lung cancer    Denies complaints and is on low-flow oxygen.  Very hoarse voice and difficult to understand patient    Objective   Vital Signs past 24hrs  Temp range: Temp (24hrs), Av.3 °F (36.3 °C), Min:97.3 °F (36.3 °C), Max:97.3 °F (36.3 °C)    BP range: BP: (152)/(93) 152/93  Pulse range: Heart Rate:  [68-86] 75  Resp rate range: Resp:  [18-20] 20  Device (Oxygen Therapy): nasal cannulaFlow (L/min):  [2] 2  Oxygen range:SpO2:  [95 %] 95 %     Physical Exam  Eyes:      Pupils: Pupils are equal, round, and reactive to light.   Cardiovascular:      Rate and Rhythm: Normal rate and regular rhythm.      Heart sounds: No murmur heard.  Pulmonary:      Effort: Pulmonary effort is normal.      Breath sounds: Decreased breath sounds present.   Abdominal:      General: Bowel sounds are normal.      Palpations: Abdomen is soft. There is no mass.      Tenderness: There is no abdominal tenderness.   Musculoskeletal:         General: No swelling.   Neurological:      Mental Status: He is alert.       Results Review:    I have reviewed the laboratory and imaging data since the last note by [facility] physician.  My annotations are noted in assessment and plan.      Result Review:  I have personally reviewed the results from last note by [facility] physician to 2023 11:55 EDT and agree with these findings:  [x]  Laboratory list / accordion  [x]  Microbiology  [x]  Radiology  []  EKG/Telemetry   []  Cardiology/Vascular   [x]  Pathology  []  Old records  []  Other:    Medication Review:  I have reviewed the current MAR.  My annotations are noted in assessment and plan.    atorvastatin, 20 mg, Oral, Daily  docusate sodium, 100 mg, Oral, BID  enoxaparin, 1 mg/kg, Subcutaneous, Q12H  levETIRAcetam, 500 mg, Oral, BID  metoprolol tartrate, 25 mg, Oral, Nightly  metoprolol tartrate, 50 mg, Oral, Daily  OLANZapine, 10 mg, Oral,  BID  pantoprazole, 40 mg, Oral, Q AM  senna-docusate sodium, 2 tablet, Oral, BID  Valproic Acid, 250 mg, Oral, TID        dilTIAZem, 5-10 mg/hr, Last Rate: Stopped (04/24/23 1504)  sodium chloride, 30 mL/hr, Last Rate: 30 mL/hr (04/27/23 1445)      Lines, Drains & Airways     Active LDAs     Name Placement date Placement time Site Days    Peripheral IV 04/23/23 2214 Right Forearm 04/23/23  2214  Forearm  7              No active isolations  Diet Orders (active) (From admission, onward)     Start     Ordered    04/27/23 1712  Diet: Cardiac Diets; Healthy Heart (2-3 Na+); Texture: Soft to Chew (NDD 3); Soft to Chew: Chopped Meat; Fluid Consistency: Thin (IDDSI 0)  Diet Effective Now         04/27/23 1717    04/25/23 1800  Dietary Nutrition Supplements Boost Plus (Ensure Enlive, Ensure Plus)  Daily With Breakfast, Lunch & Dinner       04/25/23 1348                PCCM Problems  Left lung mass, squamous cell lung cancer  Acute hypoxic respiratory failure  Relevant Medical Diagnoses  Schizophrenia  Anemia  A-fib on anticoagulation  MARYJANE improved        THESE ARE NEW MEDICAL PROBLEMS TO ME.    Plan of Treatment    Pathology shows squamous cell lung cancer.  Will require oncology and thoracic surgery input.    Continue low-flow oxygen.  Wean as tolerated.  Get follow-up chest x-ray.    Kaden Felipe MD  05/01/23  11:55 EDT      Part of this note may be an electronic transcription/translation of spoken language to printed text using the Dragon Dictation System.

## 2023-05-01 NOTE — PROGRESS NOTES
"The patient remains selectively compliant with care but is pleasant on approach when seen today.  He remains in wrist restraints and complains of feeling \"scared\" though he does not elaborate.  I have ordered a repeat Depakote level.  "

## 2023-05-01 NOTE — NURSING NOTE
Access center follow up note, regarding schizophrenia and behavioral changes.  Patient is still in restraints.  Spoke with primary RN who reports that patient is agreeing to take his medications and he is slightly better, not as agitated.  During this visit his speech is difficult to understand.    Access will continue to follow.

## 2023-05-02 LAB
LAB AP CASE REPORT: NORMAL
LAB AP DIAGNOSIS COMMENT: NORMAL
PATH REPORT.ADDENDUM SPEC: NORMAL
PATH REPORT.FINAL DX SPEC: NORMAL
PATH REPORT.GROSS SPEC: NORMAL

## 2023-05-02 PROCEDURE — 99222 1ST HOSP IP/OBS MODERATE 55: CPT | Performed by: INTERNAL MEDICINE

## 2023-05-02 PROCEDURE — 99223 1ST HOSP IP/OBS HIGH 75: CPT | Performed by: NURSE PRACTITIONER

## 2023-05-02 RX ADMIN — PANTOPRAZOLE SODIUM 40 MG: 40 TABLET, DELAYED RELEASE ORAL at 05:38

## 2023-05-02 RX ADMIN — OLANZAPINE 10 MG: 10 TABLET ORAL at 08:27

## 2023-05-02 RX ADMIN — VALPROIC ACID 250 MG: 250 SOLUTION ORAL at 08:27

## 2023-05-02 RX ADMIN — METOPROLOL TARTRATE 50 MG: 50 TABLET, FILM COATED ORAL at 08:27

## 2023-05-02 RX ADMIN — VALPROIC ACID 250 MG: 250 SOLUTION ORAL at 21:36

## 2023-05-02 RX ADMIN — ATORVASTATIN CALCIUM 20 MG: 20 TABLET, FILM COATED ORAL at 08:27

## 2023-05-02 RX ADMIN — LEVETIRACETAM 500 MG: 500 TABLET, FILM COATED ORAL at 21:36

## 2023-05-02 RX ADMIN — LEVETIRACETAM 500 MG: 500 TABLET, FILM COATED ORAL at 08:27

## 2023-05-02 RX ADMIN — METOPROLOL TARTRATE 25 MG: 25 TABLET, FILM COATED ORAL at 21:37

## 2023-05-02 RX ADMIN — OLANZAPINE 10 MG: 10 TABLET ORAL at 21:36

## 2023-05-02 RX ADMIN — APIXABAN 5 MG: 5 TABLET, FILM COATED ORAL at 21:36

## 2023-05-02 RX ADMIN — VALPROIC ACID 250 MG: 250 SOLUTION ORAL at 18:10

## 2023-05-02 NOTE — PROGRESS NOTES
Access did follow up on pt. Pt nurse states pt improved some but continues impulsive with actions and affect. Pt continues in restraints due to this. Pt fidgety in bed. Pt may be moving to Palliative Care due to lung cancer dx. Pt being followed by psychiatrist. Access will continue to follow.

## 2023-05-02 NOTE — PROGRESS NOTES
[facility] Medicine Services  PROGRESS NOTE    Patient Name: Kodi Tolliver  : 1960  MRN: [id number]    Date of Admission: 2023  Primary Care Physician: Provider, No Known    Subjective   Subjective     CC:  Follow-up lung cancer    Subjective:  Patient is more conversational today.  He is able to answer most orientation questions correctly.  His sister is at the bedside.  Patient says he can tolerate being off restraint and agrees not to pull at his lines and to ask for help when getting out of bed.  Sister understands his diagnosis of cancer.    Review of Systems  No current fevers or chills  No current nausea, vomiting, or diarrhea  No current chest pain or palpitations      Objective   Objective     Vital Signs:   Temp:  [97.6 °F (36.4 °C)-98 °F (36.7 °C)] 97.6 °F (36.4 °C)  Heart Rate:  [65-73] 70  Resp:  [16-18] 18  BP: (115-143)/(63-96) 137/87        Physical Exam:  Constitutional:Awake, alert, chronically ill-appearing but nontoxic HENT: NCAT, mucous membranes moist, neck supple  Respiratory: No cough, occasional coarse sounds, no wheezes, nonlabored breathing   Cardiovascular: Pulse rate is normal, palpable radial pulses  Gastrointestinal: Positive bowel sounds, soft, nontender, nondistended  Musculoskeletal: Somewhat chronically to be in appearance, BMI is 22, no lower extremity edema   psychiatric: More calm affect, cooperative, conversational  Neurologic: Mostly oriented, no slurred speech or facial droop, follows commands  Skin: No rashes or jaundice, warm      Results Reviewed:  Results from last 7 days   Lab Units 23  1025   WBC 10*3/mm3 9.32   HEMOGLOBIN g/dL 8.4*   HEMATOCRIT % 27.8*   PLATELETS 10*3/mm3 302     Results from last 7 days   Lab Units 23  1025 23  0536   SODIUM mmol/L 142 138   POTASSIUM mmol/L 4.3 5.6*   CHLORIDE mmol/L 100 101   CO2 mmol/L 37.0* 28.0   BUN mg/dL 12 15   CREATININE mg/dL 0.92 1.05   GLUCOSE mg/dL 103* 103*   CALCIUM mg/dL 8.9 8.9   ALT  (SGPT) U/L 15 20   AST (SGOT) U/L 24 41*     Estimated Creatinine Clearance: 83 mL/min (by C-G formula based on SCr of 0.92 mg/dL).    Microbiology Results Abnormal     Procedure Component Value - Date/Time    BAL Culture, Quantitative - Lavage, Lung, Lingula [[id number]] Collected: 04/27/23 1546    Lab Status: Final result Specimen: Lavage from Lung, Lingula Updated: 04/29/23 1059     BAL Culture 25,000 CFU/mL Normal respiratory miguelina. No S. aureus or Pseudomonas aeruginosa detected. Final report.     Gram Stain Rare (1+) WBCs per low power field      No epithelial cells seen      No organisms seen    Blood Culture - Blood, Arm, Left [[id number]]  (Normal) Collected: 04/23/23 1931    Lab Status: Final result Specimen: Blood from Arm, Left Updated: 04/28/23 2045     Blood Culture No growth at 5 days    Blood Culture - Blood, Arm, Left [[id number]]  (Normal) Collected: 04/23/23 1821    Lab Status: Final result Specimen: Blood from Arm, Left Updated: 04/28/23 1831     Blood Culture No growth at 5 days    AFB Culture - Lavage, Lung, Lingula [[id number]] Collected: 04/27/23 1546    Lab Status: Preliminary result Specimen: Lavage from Lung, Lingula Updated: 04/28/23 1820     AFB Stain No acid fast bacilli seen on concentrated smear    Urine Culture - Urine, Urine, Clean Catch [[id number]]  (Normal) Collected: 04/23/23 1619    Lab Status: Final result Specimen: Urine, Clean Catch Updated: 04/24/23 2008     Urine Culture No growth          Imaging Results (Last 24 Hours)     Procedure Component Value Units Date/Time    XR Chest 1 View [[id number]] Collected: 05/01/23 1757     Updated: 05/01/23 1803    Narrative:      XR CHEST 1 VW-     HISTORY: Male who is 63 years-old,  infiltrates, left perihilar  mass/neoplasm     TECHNIQUE: Frontal view of the chest     COMPARISON: 04/27/2023     FINDINGS: Heart, mediastinum and pulmonary vasculature are unremarkable.  Left perihilar mass is again demonstrated, with slightly  decreased  adjacent infiltrate. No pleural effusion or pneumothorax. Right  hemidiaphragm is elevated. No acute osseous process.       Impression:      As described.     This report was finalized on 5/1/2023 6:00 PM by Dr. Mirza Weaver M.D.                 I have reviewed the medications:  Scheduled Meds:atorvastatin, 20 mg, Oral, Daily  docusate sodium, 100 mg, Oral, BID  enoxaparin, 1 mg/kg, Subcutaneous, Q12H  levETIRAcetam, 500 mg, Oral, BID  metoprolol tartrate, 25 mg, Oral, Nightly  metoprolol tartrate, 50 mg, Oral, Daily  OLANZapine, 10 mg, Oral, BID  pantoprazole, 40 mg, Oral, Q AM  senna-docusate sodium, 2 tablet, Oral, BID  Valproic Acid, 250 mg, Oral, TID      Continuous Infusions:dilTIAZem, 5-10 mg/hr, Last Rate: Stopped (04/24/23 1504)      PRN Meds:.•  acetaminophen **OR** [DISCONTINUED] acetaminophen **OR** [DISCONTINUED] acetaminophen  •  aluminum-magnesium hydroxide-simethicone  •  senna-docusate sodium **AND** polyethylene glycol **AND** bisacodyl **AND** bisacodyl  •  nitroglycerin  •  OLANZapine  •  ondansetron **OR** ondansetron  •  sodium chloride  •  sodium chloride    Assessment & Plan   Assessment & Plan     Active Hospital Problems    Diagnosis  POA   • **Mass of left lung [R91.8]  Yes   • Paroxysmal atrial fibrillation [I48.0]  Yes   • Acute metabolic encephalopathy [G93.41]  Yes   • MARYJANE (acute kidney injury) [N17.9]  Yes   • COPD (chronic obstructive pulmonary disease) [J44.9]  Yes   • Chronic respiratory failure with hypoxia and hypercapnia [J96.11, J96.12]  Yes   • Stage 3b chronic kidney disease [N18.32]  Yes   • Schizophrenia [F20.9]  Yes   • Essential hypertension [I10]  Yes      Resolved Hospital Problems    Diagnosis Date Resolved POA   • Acute prostatitis/cystitis without hematuria [N30.00] 04/28/2023 Yes        Brief Hospital Course to date:  Kodi Tolliver is a 63 y.o. male presents to the hospital with left lung mass    Discussion/plan:  Patient much more calm today.  Plan  to discontinue restraints and see how patient tolerates being off of restraints today.  Continue current psychiatric medications.  He seems to be responding positively to this therapy.  Valproic acid level reviewed is 35.    Thoracic surgery feel patient is not a surgical candidate.  Reportedly oncology does not feel patient will tolerate chemotherapy well awaiting final consult note.  Consult palliative care team to help with goals of care.  Patient to return to facility and may need some sort of palliative support at the facility if there are good palliative options.    Repeat chest x-ray images from 5/1 reviewed.  No hemothorax.  Masses present.    Pathology reviewed and shows squamous cell carcinoma of the lung.  Consult oncology and thoracic surgery to weigh in.    Titrate oxygen as needed.  Chest x-ray images from 4/27 reviewed and stable after lung biopsy.    Albuterol as needed for COPD.    Appreciate psychiatry recommendation for psychiatric illnesses.    Beta-blocker for atrial fibrillation.  Stop Lovenox and transition back to Eliquis    Monitor renal function.    Treatment plan discussed with patient and his sister who are in agreement.    DVT Prophylaxis: Anticoagulation      Disposition: Return to long-term facility    CODE STATUS:   Code Status and Medical Interventions:   Ordered at: 05/02/23 0915     Level Of Support Discussed With:    Health Care Surrogate     Code Status (Patient has no pulse and is not breathing):    No CPR (Do Not Attempt to Resuscitate)     Medical Interventions (Patient has pulse or is breathing):    Comfort Measures     Release to patient:    Routine Release       Bolivar Bynum MD  05/02/23

## 2023-05-02 NOTE — CONSULTS
Consults    Patient Care Team:  Provider, No Known as PCP - General    Chief Complaint   Patient presents with   • Psychiatric Evaluation       Subjective     History of Present Illness    Mr. Tolliver is a 63-year-old gentleman with a past medical history of COPD, stage III chronic kidney disease, hypertension and schizophrenia who resides in a group home.  He presented to [facility] on 4/23/2023 for a psych evaluation for abnormal behavior including aggressiveness and talking to self.  Work-up revealed an acute UTI as well as fecal impaction.  CT chest was performed on 4/24/2023 and revealed a 6 cm left perihilar mass.  Patient underwent bronchoscopy with pulmonary medicine on 4/27/2023.  Pathology was consistent with a squamous cell carcinoma, for which we have been asked to see this gentleman.    On exam today, the patient is resting in bed in bilateral upper extremity restraints.  He is confused, slightly agitated although not aggressive.  His sister is at bedside.    Of note, medical oncology and palliative care have also been consulted.      Review of Systems   Unable to perform ROS: Mental status change        Patient Active Problem List   Diagnosis   • Paroxysmal atrial fibrillation   • Acute metabolic encephalopathy   • MARYJANE (acute kidney injury)   • COPD (chronic obstructive pulmonary disease)   • Chronic respiratory failure with hypoxia and hypercapnia   • Stage 3b chronic kidney disease   • Schizophrenia   • Essential hypertension   • Mass of left lung     Past Medical History:   Diagnosis Date   • Anxiety    • Aphonia    • Atrial fibrillation    • CHF (congestive heart failure)    • COPD (chronic obstructive pulmonary disease)    • Depression    • Essential hypertension    • Schizophrenia      Past Surgical History:   Procedure Laterality Date   • BRONCHOSCOPY Left 4/27/2023    Procedure: BRONCHOSCOPY with brushings and biopsies;  Surgeon: Kory Bey MD;  Location: [facility];  Service:  Pulmonary;  Laterality: Left;  pre- lung mass  post- same     Family History   Family history unknown: Yes     Social History     Socioeconomic History   • Marital status: Single   Tobacco Use   • Smoking status: Unknown     Passive exposure: Past (birdie)   • Smokeless tobacco: Never   • Tobacco comments:     birdie   Vaping Use   • Vaping Use: Never used   • Passive vaping exposure: Passive vaping exposure comment (birdie)   Substance and Sexual Activity   • Alcohol use: Defer     Comment: birdie   • Drug use: Defer     Types: Other     Comment: birdie   • Sexual activity: Defer     Comment: birdie     Medications Prior to Admission   Medication Sig Dispense Refill Last Dose   • apixaban (ELIQUIS) 5 MG tablet tablet Take 1 tablet by mouth 2 (Two) Times a Day.      • atorvastatin (LIPITOR) 20 MG tablet Take 1 tablet by mouth Daily.      • lansoprazole (PREVACID) 30 MG capsule Take 1 capsule by mouth Daily.      • levETIRAcetam (KEPPRA) 500 MG tablet Take 1 tablet by mouth 2 (Two) Times a Day.      • levoFLOXacin (LEVAQUIN) 750 MG tablet Take 1 tablet by mouth Daily.      • melatonin 1 MG tablet Take 3 tablets by mouth At Night As Needed for Sleep.      • metoprolol tartrate (LOPRESSOR) 50 MG tablet Take 1 tablet by mouth Daily.      • OLANZapine (zyPREXA) 10 MG tablet Take 1 tablet by mouth 2 (Two) Times a Day.      • Valproic Acid (DEPAKENE) 250 MG/5ML solution syrup Take 5 mL by mouth Daily.   4/25/2023   • acetaminophen (TYLENOL) 325 MG tablet Take 2 tablets by mouth Every 6 (Six) Hours As Needed for Mild Pain.      • albuterol sulfate HFA (Ventolin HFA) 108 (90 Base) MCG/ACT inhaler Inhale 2 puffs Every 4 (Four) Hours As Needed for Wheezing or Shortness of Air.      • magnesium hydroxide (Milk of Magnesia) 400 MG/5ML suspension Take 30 mL by mouth Daily As Needed for Constipation.      • vitamin D (ERGOCALCIFEROL) 1.25 MG (40267 UT) capsule capsule Take 1 capsule by mouth Take As Directed. Once  a month        No Known  Allergies    Objective      Vital Signs  Temp:  [97.6 °F (36.4 °C)-98 °F (36.7 °C)] 97.6 °F (36.4 °C)  Heart Rate:  [65-73] 70  Resp:  [16-18] 18  BP: (115-143)/(63-96) 137/87    Intake & Output (last day)       05/01 0701  05/02 0700 05/02 0701  05/03 0700    P.O. 480     Total Intake(mL/kg) 480 (6.6)     Urine (mL/kg/hr)      Stool      Total Output      Net +480           Stool Unmeasured Occurrence 1 x           Physical Exam  Constitutional:       General: He is not in acute distress.     Appearance: He is ill-appearing.   HENT:      Head: Normocephalic and atraumatic.   Cardiovascular:      Rate and Rhythm: Normal rate.   Pulmonary:      Effort: No respiratory distress.   Musculoskeletal:      Cervical back: Normal range of motion and neck supple.   Skin:     General: Skin is warm.   Neurological:      Mental Status: He is alert. Mental status is at baseline. He is disoriented and confused.   Psychiatric:         Attention and Perception: He perceives visual hallucinations.         Behavior: Behavior is agitated.         Cognition and Memory: Cognition is impaired.         Results Review:    I reviewed the patient's new clinical results.  I reviewed the patient's new imaging results and agree with the interpretation.  Discussed with patient, family at bedside, RN and Dr. John    Imaging Results (Last 24 Hours)     Procedure Component Value Units Date/Time    XR Chest 1 View [[id number]] Collected: 05/01/23 1757     Updated: 05/01/23 1803    Narrative:      XR CHEST 1 VW-     HISTORY: Male who is 63 years-old,  infiltrates, left perihilar  mass/neoplasm     TECHNIQUE: Frontal view of the chest     COMPARISON: 04/27/2023     FINDINGS: Heart, mediastinum and pulmonary vasculature are unremarkable.  Left perihilar mass is again demonstrated, with slightly decreased  adjacent infiltrate. No pleural effusion or pneumothorax. Right  hemidiaphragm is elevated. No acute osseous process.       Impression:      As  described.     This report was finalized on 5/1/2023 6:00 PM by Dr. Mirza Weaver M.D.             Lab Results:  Lab Results (last 24 hours)     Procedure Component Value Units Date/Time    Valproic Acid Level, Total [[id number]]  (Abnormal) Collected: 05/01/23 1258    Specimen: Blood Updated: 05/01/23 1341     Valproic Acid 35.0 mcg/mL     Narrative:      Therapeutic Ranges for Valproic Acid    Epilepsy:       mcg/ml  Bipolar/Dione  up to 125 mcg/ml      Non-gynecologic Cytology [[id number]] Collected: 04/27/23 1546    Specimen: Lavage from Lung, Lingula; Lavage from Lung, Lingula Updated: 05/01/23 1231     Case Report --     Non-gynecologic Cytology                          Case: [id number]                                  Authorizing Provider:  Kory Bey MD         Collected:           04/27/2023 03:46 PM          Ordering Location:     [facility]  Received:            04/28/2023 09:47 AM                                 [facility]                                                                  Pathologist:           Yohan Linares MD                                                         Specimens:   1) - Lung, Lingula, BAL left lingula                                                                2) - Lung, Lingula, brush left lingula                                                      Final Diagnosis --     1. Fluid, Lung, Left Lingula, Bronchoalveolar Lavage (BAL):   A. Few atypical metaplastic cell groups present highly suspicious for non-small cell malignancy  B. Reactive bronchial cells, macrophages, inflammation, squamous epithelium and mucin.  C. No evidence of fungus by GMS staining.   D. Separately processed fluid (RPMI) for flow results ([facility]).     2. Fluid, Lung, Left Lingula, Brushing:    A. Abnormal cells present consistent with poorly differentiated non-small cell carcinoma, favor squamous cell carcinoma.   B. No evidence of fungus on GMS stain.         Clinical Information --     Pneumocysitis, fungus       Comment --     Tumor appears morphologically consistent with that observed in the biopsy sampling.  Correlate with biopsy findings ([id number]).       Gross Description --     1. Lung, Lingula.  Left Lingula BAL: 1 vial received containing 12 ml of blood-tinged fluid. 1 thin prep, cellblock, GMS test, & small amount of fluid sent to [facility] for flow cytometry. No remaining fluid. Cellblock in formalin @ 10:40 on 23.    2. Lung, Lingula.  Brushin thin prep vial received with brush included. 1 thin prep, cellblock, & GMS test. Cellblock in formalin @ 10:41 on 23.         Microscopic Description --     Screened by S. Sacksteder                Assessment & Plan       Mass of left lung    Paroxysmal atrial fibrillation    Acute metabolic encephalopathy    MARYJANE (acute kidney injury)    COPD (chronic obstructive pulmonary disease)    Chronic respiratory failure with hypoxia and hypercapnia    Stage 3b chronic kidney disease    Schizophrenia    Essential hypertension      Assessment & Plan    I have independently reviewed patient's radiographic imaging.  There is a 6 cm left perihilar mass seen on CT chest.    Non-small cell lung cancer: 6 cm left lung mass seen on imaging.  Status post bronchoscopy on 2023 where pathology revealed a squamous cell carcinoma.  Patient is not a surgical candidate given his history of schizophrenia and aggressive behavior.  Given the location of this large lung mass, patient would require either a bilobectomy or pneumonectomy, which he would likely not tolerate.  Would recommend PET scan to assess for metastatic disease and consult to radiation oncology.  Patient is being evaluated by medical oncology and we will defer to them for further management.  Discussed with his sister at bedside who is leaning toward comfort measures.  Palliative care consult noted as well.    Schizophrenia: Complicating all aspects of care.  Defer  to primary service      I discussed the patients findings and our recommendations with patient, nursing staff and Dr. John     Nothing further to add from a thoracic perspective.  We will sign off.    Thank you for this consult and allowing us to participate in the care of your patient.       Brittaney Rodrigez DNP, APRN  [facility]  05/02/23  11:05 EDT    I spent >65 minutes reviewing the patient's chart including medical history, notes, radiographic imaging, labs and performing an assessment and development of a plan and discussion with the patient/family at bedside.

## 2023-05-02 NOTE — PROGRESS NOTES
Agree with thoracic surgery input.  Nothing much to add here.  Await oncology consult.  Wean oxygen as tolerated    Kaden Felipe MD  05/02/23  12:09 EDT

## 2023-05-02 NOTE — PLAN OF CARE
Problem: Restraint, Nonviolent  Goal: Absence of Harm or Injury  5/2/2023 0631 by Nichole Horta RN  Outcome: Ongoing, Progressing  5/2/2023 0629 by Nichole Horta RN  Outcome: Ongoing, Progressing  Intervention: Implement Least Restrictive Safety Strategies  Recent Flowsheet Documentation  Taken 5/2/2023 0600 by Nichole Horta RN  Medical Device Protection:   IV pole/bag removed from visual field   tubing secured   torso covered  Less Restrictive Alternative: bed alarm in use  De-Escalation Techniques:   increased round frequency   reoriented   stimulation decreased  Diversional Activities: television  Taken 5/2/2023 0400 by Nichole Horta RN  Medical Device Protection:   IV pole/bag removed from visual field   torso covered   tubing secured  Less Restrictive Alternative: bed alarm in use  De-Escalation Techniques:   reoriented   stimulation decreased   increased round frequency  Diversional Activities: television  Taken 5/2/2023 0200 by Nichole Horta RN  Medical Device Protection:   torso covered   tubing secured   IV pole/bag removed from visual field  Less Restrictive Alternative: bed alarm in use  De-Escalation Techniques:   reoriented   stimulation decreased  Diversional Activities: television  Taken 5/2/2023 0000 by Nichole Horta RN  Medical Device Protection:   torso covered   tubing secured   IV pole/bag removed from visual field  Less Restrictive Alternative: bed alarm in use  De-Escalation Techniques:   increased round frequency   stimulation decreased   reoriented   verbally redirected  Diversional Activities: television  Taken 5/1/2023 2200 by Nichole Horta RN  Medical Device Protection:   torso covered   tubing secured  Less Restrictive Alternative: bed alarm in use  De-Escalation Techniques:   increased round frequency   stimulation decreased   reoriented   verbally redirected  Diversional Activities: television  Taken 5/1/2023 2000 by Nichole Horta  RN  Medical Device Protection:   tubing secured   torso covered  Less Restrictive Alternative: bed alarm in use  De-Escalation Techniques:   increased round frequency   reoriented   stimulation decreased   verbally redirected  Diversional Activities: television  Intervention: Protect Dignity, Rights, and Personal Wellbeing  Recent Flowsheet Documentation  Taken 5/2/2023 0200 by Nichole Horta RN  Trust Relationship/Rapport:   care explained   choices provided  Taken 5/1/2023 2000 by Nichole Horta RN  Trust Relationship/Rapport:   care explained   choices provided  Intervention: Protect Skin and Joint Integrity  Recent Flowsheet Documentation  Taken 5/2/2023 0600 by Nichole Horta RN  Body Position: position changed independently  Taken 5/2/2023 0400 by Nichole Horta RN  Body Position: position changed independently  Taken 5/2/2023 0200 by Nichole Horta RN  Body Position: position changed independently  Taken 5/2/2023 0000 by Nichole Horta RN  Body Position: position changed independently  Taken 5/1/2023 2200 by Nichole Horta RN  Body Position: position changed independently  Taken 5/1/2023 2000 by Nichole Horta RN  Body Position: position changed independently  Range of Motion: active ROM (range of motion) encouraged   Goal Outcome Evaluation:  Plan of Care Reviewed With: patient           Outcome Evaluation: No Change

## 2023-05-02 NOTE — PLAN OF CARE
Goal Outcome Evaluation:           Progress: no change  Outcome Evaluation: Pt transferred to [facility] for palliative care. No discomfort presented. Incontinence care provided. Discharge planning. Will cont to monitor

## 2023-05-02 NOTE — PROGRESS NOTES
The patient is brighter today and is aware that we are it best to self.  I have reviewed the patient's record with regards to his medical prognosis and probable moved to palliative for comfort measures.  I will continue to follow if behavioral issues persist, however.

## 2023-05-02 NOTE — CONSULTS
Purpose of the visit was to evaluate for: goals of care/advanced care planning, support for patient/family and hospice referral/discussion. Spoke with RN as well as patient and family and discussed palliative care, goals of care and [facility].      Assessment:  Patient is palliative care appropriate given lung cancer, COPD, afib, MARYJANE on CKD III, schizophrenia. Not a surgical candidate, not a candidate for cancer treatment. Patient alert, speech difficult to understand at times. Patient denies any discomfort. Asking for ice cream. PPS: 30%.     Recommendations/Plan: Family meeting with hospice 5/3 at 2pm to discuss hospice services at facility. Dgtr and sister confirm goal is comfort. Dgtr is legal NOK and decision maker, lives in [place] and making arrangements to visit soon.     Other Comments:   Spoke to patient's sister and dgtr via phone. They are not aware of any POA/advance directive documents naming a HCS/POA. Sister was going to look to see if she could find any documents as she recalled signing a form at [facility] but could not remember specifics. Sister and dgtr communicate daily and dgtr has been getting updates from patient's sister. They both have a good relationship and support for one another. Dgtr would like to be updated directly from the medical team, especially with any changes in patient's condition. They both voiced understanding of patient's current condition and overall prognosis. Dgtr acknowledged that patient did not want work up when the lung mass was first discovered a year ago. Family agrees with next step in care being comfort and understand likely d/c plan to facility and hospice services to follow for ongoing support.     Family reflected on loss of patient's mother 2 months ago and how they are still trying to process her loss and their grief. They both have strong christian and beliefs in life after death that help them find hope and peace in this situation as well. Provided  emotional support to family. Advised of availability and provided palliative contact information and unit phone number.

## 2023-05-02 NOTE — CONSULTS
Subjective     REASON FOR CONSULTATION: LARGE LEFT LUNG CANCER IN A PATIENT WITH SEVERE NEUROLOGIC DEFICIT DUE TO PSYCHIATRIC HISTORY, PATIENT REQUIRES TOTAL CARE : THERE IS NO ROOM FOR ANY SURGERY, CHEMO OR RADIATION: ADVICE TO PT'S SISTER PALLIATIVE CARE ANS SYMPTOM CONTROL WITH [facility].       Provide an opinion on any further workup or treatment                             REQUESTING PHYSICIAN:   Bolivar Bynum MD       Attending     Since 5/1/2023     [phone]          RECORDS OBTAINED:  Records of the patients history including those obtained from the referring provider were reviewed and summarized in detail.        History of Present Illness On 05/02/2023 I have been asked to see this 63-year-old black American who has admitted to the hospital now for almost a week. He was found to have radiologically a large mass in the left hilar area. Since then he has proceeded with a bronchoscopy and biopsy that has documented a squamous cell carcinoma. The patient is unable to provide me any information in regard to symptoms given his advanced neurological alteration associated with psychiatric illness that he has had for a long time. In further talking to the patient's sister who is the POWER OF  this issue has been an ongoing issue for many months now and he has deteriorated very dramatically. The patient barely is able to verbalize anything and he is not able to understand too much of anything that is going on surrounding him. The patient has very minimal appetite. He does not even know what to do with the food and he has difficulty swallowing and chewing. He has significant degree of cough with no hemoptysis. He has no obvious evidence of pain. He has become more weak to the point that he is lying in bed most of the time. He has persistent dry, hacking cough. He does not look like he is in pain. He has frequent episodes of hallucinations and delirium.     He already has been seen by  psychiatrist. Medications have been initiated for psychiatric illness.        Past Medical History:   Diagnosis Date    Anxiety     Aphonia     Atrial fibrillation     CHF (congestive heart failure)     COPD (chronic obstructive pulmonary disease)     Depression     Essential hypertension     Schizophrenia         Past Surgical History:   Procedure Laterality Date    BRONCHOSCOPY Left 4/27/2023    Procedure: BRONCHOSCOPY with brushings and biopsies;  Surgeon: Kory Bey MD;  Location: [facility] ENDOSCOPY;  Service: Pulmonary;  Laterality: Left;  pre- lung mass  post- same        No current facility-administered medications on file prior to encounter.     Current Outpatient Medications on File Prior to Encounter   Medication Sig Dispense Refill    apixaban (ELIQUIS) 5 MG tablet tablet Take 1 tablet by mouth 2 (Two) Times a Day.      atorvastatin (LIPITOR) 20 MG tablet Take 1 tablet by mouth Daily.      lansoprazole (PREVACID) 30 MG capsule Take 1 capsule by mouth Daily.      levETIRAcetam (KEPPRA) 500 MG tablet Take 1 tablet by mouth 2 (Two) Times a Day.      levoFLOXacin (LEVAQUIN) 750 MG tablet Take 1 tablet by mouth Daily.      melatonin 1 MG tablet Take 3 tablets by mouth At Night As Needed for Sleep.      metoprolol tartrate (LOPRESSOR) 50 MG tablet Take 1 tablet by mouth Daily.      OLANZapine (zyPREXA) 10 MG tablet Take 1 tablet by mouth 2 (Two) Times a Day.      Valproic Acid (DEPAKENE) 250 MG/5ML solution syrup Take 5 mL by mouth Daily.      acetaminophen (TYLENOL) 325 MG tablet Take 2 tablets by mouth Every 6 (Six) Hours As Needed for Mild Pain.      albuterol sulfate HFA (Ventolin HFA) 108 (90 Base) MCG/ACT inhaler Inhale 2 puffs Every 4 (Four) Hours As Needed for Wheezing or Shortness of Air.      magnesium hydroxide (Milk of Magnesia) 400 MG/5ML suspension Take 30 mL by mouth Daily As Needed for Constipation.      vitamin D (ERGOCALCIFEROL) 1.25 MG (55198 UT) capsule capsule Take 1 capsule by mouth  Take As Directed. Once  a month          ALLERGIES:  No Known Allergies     Social History     Socioeconomic History    Marital status: Single   Tobacco Use    Smoking status: Unknown     Passive exposure: Past (birdie)    Smokeless tobacco: Never    Tobacco comments:     birdie   Vaping Use    Vaping Use: Never used    Passive vaping exposure: Passive vaping exposure comment (birdie)   Substance and Sexual Activity    Alcohol use: Defer     Comment: birdie    Drug use: Defer     Types: Other     Comment: birdie    Sexual activity: Defer     Comment: birdie        Family History   Family history unknown: Yes        Review of Systems   Unable to perform ROS: Mental status change   Constitutional: Positive for activity change and appetite change.   HENT: Negative.    Respiratory: Positive for cough and shortness of breath.    Cardiovascular: Negative.    Gastrointestinal: Negative.    Genitourinary: Negative.    Musculoskeletal: Negative.    Skin: Negative.    Neurological: Negative.    Psychiatric/Behavioral: Positive for agitation, confusion, hallucinations and sleep disturbance. The patient is hyperactive.         Objective     Vitals:    05/01/23 2025 05/01/23 2352 05/02/23 0430 05/02/23 0720   BP: 140/70 143/96 115/63 137/87   BP Location: Left arm Left arm Left arm Left arm   Patient Position: Lying Lying Lying Lying   Pulse: 73 71 69 70   Resp: 18 18 16 18   Temp: 97.6 °F (36.4 °C) 97.8 °F (36.6 °C) 98 °F (36.7 °C) 97.6 °F (36.4 °C)   TempSrc: Oral Oral Oral Oral   SpO2: 95%  95% 95%   Weight:       Height:              View : No data to display.                Physical Exam  Constitutional:       Appearance: He is ill-appearing.   Eyes:      General: No scleral icterus.  Cardiovascular:      Rate and Rhythm: Normal rate and regular rhythm.      Heart sounds: No murmur heard.    No gallop.   Pulmonary:      Effort: No respiratory distress.      Breath sounds: No stridor. Wheezing and rhonchi present. No rales.   Chest:       Chest wall: No tenderness.   Abdominal:      General: Abdomen is flat. There is no distension.      Tenderness: There is no abdominal tenderness. There is no guarding or rebound.   Musculoskeletal:      Right lower leg: No edema.      Left lower leg: No edema.   Lymphadenopathy:      Cervical: No cervical adenopathy.   Skin:     General: Skin is warm.   Neurological:      Mental Status: He is disoriented.           RECENT LABS:  Hematology WBC   Date Value Ref Range Status   04/29/2023 9.32 3.40 - 10.80 10*3/mm3 Final     RBC   Date Value Ref Range Status   04/29/2023 3.32 (L) 4.14 - 5.80 10*6/mm3 Final     Hemoglobin   Date Value Ref Range Status   04/29/2023 8.4 (L) 13.0 - 17.7 g/dL Final     Hematocrit   Date Value Ref Range Status   04/29/2023 27.8 (L) 37.5 - 51.0 % Final     Platelets   Date Value Ref Range Status   04/29/2023 302 140 - 450 10*3/mm3 Final      Tissue Pathology Exam: [id number]  Order: [id number]  Status: Final result     Visible to patient: No (scheduled for 5/4/2023 10:30 AM)     Next appt: 05/12/2023 at 09:15 AM in Radiology ([facility] CT 2)     Dx: Mass of left lung     Specimen Information: Lung, Lingula; Tissue   0 Result Notes      Component    Case Report   Surgical Pathology Report                         Case: [id number]                                   Authorizing Provider:  Kory Bey MD         Collected:           04/27/2023 03:50 PM           Ordering Location:     [facility]  Received:            04/27/2023 09:55 PM                                  ENDO SUITES                                                                   Pathologist:           Yohan Linares MD                                                          Specimen:    Lung, Lingula, Left lingula biopsy                                                         Final Diagnosis   1. Left Lingula Biopsy:    A. Fragmented squamous cell carcinoma (see comment).      Jat/kds    Electronically signed by  "Yohan Linares MD on 5/1/2023 at 1030   Comment    Few detached fragmented nests of dysplastic squamous cells are seen consistent with at least squamous cell carcinoma in-situ with few fragments of moderately differentiated squamous cell carcinoma.  Immunostains for p63, p40, Ck5/6, Ck7, TTF-1 and Ki67 are performed on the core biopsy specimen utilizing appropriate controls.  Malignant cells are immunoreactive for CK5/6, P63, P40 and focally reactive for CK7.  Tumor cells are negative for TTF1.  This case was shared in consultation with Dr. Mejia,  who concurred with the diagnosis.  The paraffin block will be sent for PD-L1 testing by IHC with separate report to follow.        Pierret/jennie   Gross Description    1. Lung, Lingula.   Received in formalin labeled with the patient's name and designated \"left lingula biopsy\" the specimen consists of irregular red tan tissue pieces measuring 1.2 x 0.4 x 0.1 cm in total aggregate dimension. The specimen is filtered and submitted en toto in a single cassette.     ht/uso/mounika/komal       Resulting Agency [facility]              Specimen Collected: 04/27/23 15:50 EDT Last Resulted: 05/01/23 10:30 EDT             CT CHEST WITHOUT CONTRAST     HISTORY: 6 cm left perihilar mass     TECHNIQUE: Radiation dose reduction techniques were utilized, including  automated exposure control and exposure modulation based on body size.   3 mm images were obtained through the chest without the administration  of IV contrast. Lack of IV contrast limits evaluation of mediastinal,  hilar, and vascular structures. Sensitivity for underlying lesions and  infection decreased. Image quality is somewhat degraded by streak/motion  artifact.     COMPARISON: 04/23/2023     FINDINGS:     Thoracic inlet: Within normal noncontrast limits.     Heart and great vessels: The heart size is stable. Atherosclerosis  including coronary artery calcifications.     Lymphatics/lung parenchyma/pleural space.     Evaluation " is limited by extensive respiratory motion artifact. 3.9 x  5.9 cm left perihilar mass (axial image 50) with narrowing of the left  upper lobe bronchus and patchy groundglass reticulonodular groundglass  opacities in the periphery of the left upper lobe and both lung bases.  Pleural and parenchymal scarring. Mild bronchial wall thickening and  dilatation. No significant pleural fluid. No pneumothorax.     Upper abdomen: Small hiatal hernia with thickening of the distal  esophagus. Ectasia of the abdominal aorta incompletely imaged. Stable  nodular thickening left adrenal gland. Noncontrast imaging of the upper  abdomen is otherwise unchanged. Please refer to the separate dictated  report of the abdomen performed on 04/03/2023.        Bone windows: Multilevel degenerative changes. If there is concern for  osseous metastatic disease, recommend bone scan.        IMPRESSION:  Impression:     1.  Left perihilar 3.9 x 5 9 cm mass/neoplasm concerning with narrowing  of the left upper lobe bronchus and and multifocal patchy groundglass  opacities in the left upper and lower lobes, superimposed infection not  excluded. Management options include biopsy (via CT or bronchoscopy) and  PET imaging.  2.  Please see above for additional findings/recommendations.     I discussed these findings with Kirsty Escalante RN at 1:48 PM on  01/24/2023.     This report was finalized on 4/24/2023 2:50 PM by Dr. Rin Cifuentes M.D.       Assessment & Plan   On 05/02/2023, in summary this 63-year-old patient who has been in the hospital now for almost a week and been diagnosed with a squamous cell carcinoma of the left lung. He had a very large primary tumor visible radiologically through the CT scan. He has spiculated margins very close to the left hilar area. There is no obvious mediastinal or hilar adenopathy. There is no obvious pleural effusion. Adrenal glands were unremarkable. Liver anatomy disclosed no changes.     I do believe that  thinking about this patient's mental status and his advancement of disorder the patient is not a candidate to have any kind of surgery. He will require a pneumonectomy and I doubt he will survive that. The next question will be if the patient will be able to have palliative radiation treatment. I asked this question to the Radiology team. They do not believe that the patient will be able to stay still to receive radiation therapy. He will require to be placed under general anesthesia every single day for 3-5 weeks and that logistically is a nightmare.     Finally, chemotherapy and immunotherapy in my opinion are not an option in somebody who has so many comorbidities and have neurological deficits of the care that we want to provide to the patient and unable to perform given the fact that his mental status is so poor. The patient is not able to relate anything to us. He is barely able to express anything to his sister and I think the time is gone very much for him in regard to any possible palliative treatment. Given these facts I advised the patient's sister and she accepted from me and was very appreciative of my discussion in regard to the need for [facility] care and positive transfer to an institution where the patient will be able to receive palliative care through Hospice. Life expectancy is measured in several weeks to several months.     I strongly recommend the primary team to transfer the patient to palliative care. There is no need for this patient to be under the monitor anymore. In the meantime all the plans can be made to rearrange his transfer.     I will not follow the patient actively under the present circumstances and the primary team will take care of the patient's priority needs.

## 2023-05-03 PROBLEM — Z66 DNR (DO NOT RESUSCITATE): Status: ACTIVE | Noted: 2023-05-03

## 2023-05-03 PROBLEM — C34.12 MALIGNANT NEOPLASM OF UPPER LOBE OF LEFT LUNG: Status: ACTIVE | Noted: 2023-05-03

## 2023-05-03 PROBLEM — Z51.5 COMFORT MEASURES ONLY STATUS: Status: ACTIVE | Noted: 2023-05-03

## 2023-05-03 PROBLEM — N17.9 AKI (ACUTE KIDNEY INJURY): Status: RESOLVED | Noted: 2023-04-23 | Resolved: 2023-05-03

## 2023-05-03 PROBLEM — G93.41 ACUTE METABOLIC ENCEPHALOPATHY: Status: RESOLVED | Noted: 2023-04-23 | Resolved: 2023-05-03

## 2023-05-03 RX ORDER — VALPROIC ACID 250 MG/5ML
250 SOLUTION ORAL 3 TIMES DAILY
Qty: 600 ML
Start: 2023-05-03

## 2023-05-03 RX ORDER — ALUMINA, MAGNESIA, AND SIMETHICONE 2400; 2400; 240 MG/30ML; MG/30ML; MG/30ML
15 SUSPENSION ORAL EVERY 6 HOURS PRN
Start: 2023-05-03

## 2023-05-03 RX ORDER — UREA 10 %
3 LOTION (ML) TOPICAL NIGHTLY
Qty: 30 TABLET
Start: 2023-05-03

## 2023-05-03 RX ORDER — AMOXICILLIN 250 MG
2 CAPSULE ORAL DAILY
Start: 2023-05-03

## 2023-05-03 RX ADMIN — APIXABAN 5 MG: 5 TABLET, FILM COATED ORAL at 21:23

## 2023-05-03 RX ADMIN — APIXABAN 5 MG: 5 TABLET, FILM COATED ORAL at 10:25

## 2023-05-03 RX ADMIN — VALPROIC ACID 250 MG: 250 SOLUTION ORAL at 21:23

## 2023-05-03 RX ADMIN — ATORVASTATIN CALCIUM 20 MG: 20 TABLET, FILM COATED ORAL at 10:25

## 2023-05-03 RX ADMIN — OLANZAPINE 10 MG: 10 TABLET ORAL at 10:25

## 2023-05-03 RX ADMIN — VALPROIC ACID 250 MG: 250 SOLUTION ORAL at 10:25

## 2023-05-03 RX ADMIN — VALPROIC ACID 250 MG: 250 SOLUTION ORAL at 16:28

## 2023-05-03 RX ADMIN — LEVETIRACETAM 500 MG: 500 TABLET, FILM COATED ORAL at 10:25

## 2023-05-03 RX ADMIN — METOPROLOL TARTRATE 25 MG: 25 TABLET, FILM COATED ORAL at 21:23

## 2023-05-03 RX ADMIN — LEVETIRACETAM 500 MG: 500 TABLET, FILM COATED ORAL at 21:23

## 2023-05-03 RX ADMIN — METOPROLOL TARTRATE 50 MG: 50 TABLET, FILM COATED ORAL at 10:25

## 2023-05-03 RX ADMIN — OLANZAPINE 10 MG: 10 TABLET ORAL at 21:23

## 2023-05-03 NOTE — PLAN OF CARE
Goal Outcome Evaluation:           Progress: no change  Outcome Evaluation: Pt can be cooperative with staff--takes redirection multiple times and calm presence. Visual hallucinations at  times. Incontinence care provided, will use urinal at times. Plan is to  D/C tomrrow at 0730. Will con to monitor

## 2023-05-03 NOTE — PROGRESS NOTES
Case Management Discharge Note      Final Note: Discharged to [facility], LTC medicaid bed, palliative and [facility] to transport 5/4/23 @ 07:30. BPhil Canales RN CCP.         Selected Continued Care - Admitted Since 4/23/2023     Destination Coordination complete.    Service Provider Selected Services Address Phone Fax Patient Preferred    [facility] [address] [facility] [facility] [address] [address] [phone] --          Durable Medical Equipment    No services have been selected for the patient.              Dialysis/Infusion    No services have been selected for the patient.              Home Medical Care    No services have been selected for the patient.              Therapy    No services have been selected for the patient.              Community Resources    No services have been selected for the patient.              Community & DME    No services have been selected for the patient.                  Transportation Services  Ambulance: [facility]    Final Discharge Disposition Code: 04 - intermediate care facility

## 2023-05-03 NOTE — PROGRESS NOTES
Case Management Discharge Note      Final Note: Discharged to [facility], LTC medicaid bed, palliative and [facility] to transport today. SHERINE Canales, ERIC CCP.         Selected Continued Care - Admitted Since 4/23/2023     Destination Coordination complete.    Service Provider Selected Services Address Phone Fax Patient Preferred    [facility] [address] [facility] [facility] [address] [phone] --          Durable Medical Equipment    No services have been selected for the patient.              Dialysis/Infusion    No services have been selected for the patient.              Home Medical Care    No services have been selected for the patient.              Therapy    No services have been selected for the patient.              Community Resources    No services have been selected for the patient.              Community & DME    No services have been selected for the patient.                  Transportation Services  Ambulance: [facility]    Final Discharge Disposition Code: 04 - intermediate care facility

## 2023-05-03 NOTE — PROGRESS NOTES
Discharge Planning Assessment  [facility]     Patient Name: Kodi Tolliver  MRN: [id number]  Today's Date: 5/3/2023    Admit Date: 4/23/2023    Plan: Pending Hospital course; pt from [facility]   Discharge Needs Assessment    No documentation.                Discharge Plan     Row Name 05/03/23 1105       Plan    Plan Comments Placed call to the patient's daughter/Gabi Nam and left a secure voice mail to review the discharge plans. SHERINE Canales RN, CCP.    Row Name 05/03/23 0848       Plan    Plan Comments The patient transferred to [facility] from [address] [place] [address] on 5/2/23. The patient is palliative. [facility] to meet today at 14:00 with the sister to do an explanation [facility]. The discharge plan is for the patient to return to [facility], [facility], medicaid bed, palliative and [facility] to follow. [facility] set up for today at 17:00. SHERINE Canales RN CCP.              Continued Care and Services - Admitted Since 4/23/2023     Destination Coordination complete.    Service Provider Request Status Selected Services Address Phone Fax Patient Preferred    [facility]  Selected Intermediate Care [address] [facility] [address] [phone] --              Expected Discharge Date and Time     Expected Discharge Date Expected Discharge Time    May 3, 2023          Demographic Summary    No documentation.                Functional Status    No documentation.                Psychosocial    No documentation.                Abuse/Neglect    No documentation.                Legal    No documentation.                Substance Abuse    No documentation.                Patient Forms    No documentation.                   Loretta Canales RN

## 2023-05-03 NOTE — PROGRESS NOTES
Discharge Planning Assessment  [facility]     Patient Name: Kodi Tolliver  MRN: [id number]  Today's Date: 5/3/2023    Admit Date: 4/23/2023    Plan: Pending Hospital course; pt from [facility]   Discharge Needs Assessment    No documentation.                Discharge Plan     Row Name 05/03/23 1601       Plan    Plan Comments Spoke with the patient's daughter and she is agreeable to the discharge plans. She was udpated that \[facility]\"" to meet with the patient's sister tomorrow at 10:00 and she will call in so that she can be part of the meeting. The daughter gave permission for her Aunt/Katie to sign the ambulance DNR. Katie came by the hospital and signed the ambulance DNR. Faxed the DNR to [facility], placed copy in the patient's chart and attached the original to the ambulance form for transportation. Discharge packet ready and given to RN. Charisse [facility] states they can accept him back to a LTC, medicaid, palliative bed today and no COVID test is needed. [facility] just called and they have an emergency and will need to move this patient to 5/4/23 @ 07:30. Notified RN and she will notified the family SHERINE Canales RN, CCP.    Final Discharge Disposition Code 04 - intermediate care facility    Final Note Discharged to [facility] medicaid bed, palliative and [facility] to transport 5/4/23 @ 07:30. SHERINE Canales RN CCP.    Row Name 05/03/23 1400       Plan    Plan Comments I have tried to call the patient's daughter twice and no answer so far. SHERINE Canales RN CCP.              Continued Care and Services - Admitted Since 4/23/2023     Destination Coordination complete.    Service Provider Request Status Selected Services Address Phone Fax Patient Preferred    [facility]  [facility] [address] [facility] [address] [phone] --              Expected Discharge Date and Time     Expected Discharge Date Expected Discharge Time    May 3, 2023          Demographic Summary     No documentation.                Functional Status    No documentation.                Psychosocial    No documentation.                Abuse/Neglect    No documentation.                Legal    No documentation.                Substance Abuse    No documentation.                Patient Forms    No documentation.                   Loretta Canales RN

## 2023-05-03 NOTE — NURSING NOTE
[facility] follow-up.  Patient being assisted to by staff.  He has been moved to palliative care.  Family meeting with hospitals today; plan is for him to return to [facility] with palliative and [facility] following.        Access will sign off.

## 2023-05-03 NOTE — PLAN OF CARE
Goal Outcome Evaluation:  Plan of Care Reviewed With: patient        Progress: declining  Outcome Evaluation: Patient agitated and uncooperative at times, very labile with behavior and emotions. Patient refusing care at times. Patient more cooperative if given time to calm himself alone. Patient with some visual hallucinations, easily reoriented but forgetful. Denied pain throughout shift. Patient able to use urinal at times, other times is incontinent, occasionally refuses to let staff change linens until calmer. Patient states he feels anxious. O2 at 2lpm. Patient slept for a few hours overnight, no visitors at bedside, discharge planning to take place, patient is palliative care, will continue comfort measures.

## 2023-05-03 NOTE — SIGNIFICANT NOTE
05/03/23 0846   OTHER   Discipline physical therapist   Rehab Time/Intention   Session Not Performed other (see comments)  (Patient transitioned to palliative. Comfort measures. Acute PT will s/o at this time.)

## 2023-05-03 NOTE — PROGRESS NOTES
Discharge Planning Assessment  [facility]     Patient Name: Kodi Tolliver  MRN: [id number]  Today's Date: 5/3/2023    Admit Date: 4/23/2023    Plan: Pending Hospital course; pt from [facility]   Discharge Needs Assessment    No documentation.                Discharge Plan     Row Name 05/03/23 0848       Plan    Plan Comments The patient transferred to [facility] from [address] [place] [address] on 5/2/23. The patient is palliative. [facility] to meet today at 14:00 with the sister to do an explanation of benefits with [facility]. The discharge plan is for the patient to return to [facility], medicaid bed, palliative and [facility] to follow. [facility] set up for today at 17:00. SHERINE Canales RN CCP.              Continued Care and Services - Admitted Since 4/23/2023     Destination Coordination complete.    Service Provider Request Status Selected Services Address Phone Fax Patient Preferred    [facility]  Selected Intermediate Care [address] [facility] [address] [phone] --              Expected Discharge Date and Time     Expected Discharge Date Expected Discharge Time    May 3, 2023          Demographic Summary    No documentation.                Functional Status    No documentation.                Psychosocial    No documentation.                Abuse/Neglect    No documentation.                Legal    No documentation.                Substance Abuse    No documentation.                Patient Forms    No documentation.                   Loretta Canales RN     Left message to call back.

## 2023-05-03 NOTE — PROGRESS NOTES
[facility] INPATIENT PROGRESS NOTE         [address]    5/3/2023      PATIENT IDENTIFICATION:  Name: Kodi Tolliver ADMIT: 2023   : 1960  PCP: Provider, No Known    MRN: [id number] LOS: 10 days   AGE/SEX: 63 y.o. male  ROOM: [facility]                     LOS 10    Reason for visit: Lung cancer      SUBJECTIVE:      Calm and cooperative.  No new complaints.  Denies current chest pain or shortness of breath on supplemental oxygen. I am seeing the patient for the first time today.  All patient problems are new to me.      Objective   OBJECTIVE:    Vital Sign Min/Max for last 24 hours  Temp  Min: 97.7 °F (36.5 °C)  Max: 99.3 °F (37.4 °C)   BP  Min: 110/68  Max: 138/75   Pulse  Min: 94  Max: 95   Resp  Min: 16  Max: 16   SpO2  Min: 93 %  Max: 94 %   No data recorded   No data recorded                         Body mass index is 22.23 kg/m².    Intake/Output Summary (Last 24 hours) at 5/3/2023 1120  Last data filed at 5/3/2023 0640  Gross per 24 hour   Intake 1160 ml   Output 650 ml   Net 510 ml         Exam:  GEN:  No distress, appears stated age  EYES:   PERRL, anicteric sclerae  ENT:    External ears/nose normal, OP clear  NECK:  No adenopathy, midline trachea  LUNGS: Normal chest on inspection, palpation and auscultation  CV:  Normal S1S2, without murmur  ABD:  Nontender, nondistended, no hepatosplenomegaly, +BS  EXT:  No edema.  No cyanosis or clubbing.  No mottling and normal cap refill.    Assessment     Scheduled meds:  apixaban, 5 mg, Oral, Q12H  atorvastatin, 20 mg, Oral, Daily  docusate sodium, 100 mg, Oral, BID  levETIRAcetam, 500 mg, Oral, BID  metoprolol tartrate, 25 mg, Oral, Nightly  metoprolol tartrate, 50 mg, Oral, Daily  OLANZapine, 10 mg, Oral, BID  pantoprazole, 40 mg, Oral, Q AM  senna-docusate sodium, 2 tablet, Oral, BID  Valproic Acid, 250 mg, Oral, TID      IV meds:                      dilTIAZem, 5-10 mg/hr, Last Rate: Stopped (23 [id number])      Data Review:  Results  from last 7 days   Lab Units 04/29/23  1025   SODIUM mmol/L 142   POTASSIUM mmol/L 4.3   CHLORIDE mmol/L 100   CO2 mmol/L 37.0*   BUN mg/dL 12   CREATININE mg/dL 0.92   GLUCOSE mg/dL 103*   CALCIUM mg/dL 8.9         Estimated Creatinine Clearance: 83 mL/min (by C-G formula based on SCr of 0.92 mg/dL).  Results from last 7 days   Lab Units 04/29/23  1025   WBC 10*3/mm3 9.32   HEMOGLOBIN g/dL 8.4*   PLATELETS 10*3/mm3 302         Results from last 7 days   Lab Units 04/29/23  1025   ALT (SGPT) U/L 15   AST (SGOT) U/L 24                 No results found for: HGBA1C, POCGLU    Chest x-ray 5/1 reviewed              Active Hospital Problems    Diagnosis  POA   • **Malignant neoplasm of upper lobe of left lung [C34.12]  Yes   • DNR (do not resuscitate) [Z66]  Yes   • Comfort measures only status [Z51.5]  Not Applicable   • Paroxysmal atrial fibrillation [I48.0]  Yes   • COPD (chronic obstructive pulmonary disease) [J44.9]  Yes   • Chronic respiratory failure with hypoxia and hypercapnia [J96.11, J96.12]  Yes   • Stage 3b chronic kidney disease [N18.32]  Yes   • Schizophrenia [F20.9]  Yes   • Essential hypertension [I10]  Yes   • Mass of left lung [R91.8]  Yes      Resolved Hospital Problems    Diagnosis Date Resolved POA   • Acute prostatitis/cystitis without hematuria [N30.00] 04/28/2023 Yes   • Acute metabolic encephalopathy [G93.41] 05/03/2023 Yes   • MARYJANE (acute kidney injury) [N17.9] 05/03/2023 Yes         ASSESSMENT:    Left lung mass, squamous cell lung cancer  Acute hypoxic respiratory failure  Schizophrenia  Anemia  A-fib on anticoagulation  MARYJANE improved          PLAN:    Pathology consistent with squamous cell lung cancer.  Oncology and thoracic surgery following.  Noted plan for discharge    Konstantin Fleming MD  Pulmonary and Critical Care Medicine  [facility], [facility]  5/3/2023    11:20 EDT

## 2023-05-03 NOTE — DISCHARGE SUMMARY
[facility] Medicine Services  DISCHARGE SUMMARY    Patient Name: Kodi Tolliver  : 1960  MRN: [id number]    Date of Admission: 2023  3:45 PM  Date of Discharge:   5/3/2023    Consults     Date and Time Order Name Status Description    2023  3:00 PM Inpatient Thoracic Surgery Consult Completed     2023  3:00 PM Hematology & Oncology Inpatient Consult Completed     2023  5:46 AM Inpatient Pulmonology Consult Completed     2023 11:34 PM Inpatient Cardiology Consult Completed     2023 10:18 PM Inpatient Psychiatrist Consult Completed           Hospital Course       Active Hospital Problems    Diagnosis  POA   • **Malignant neoplasm of upper lobe of left lung [C34.12]  Yes   • DNR (do not resuscitate) [Z66]  Yes   • Comfort measures only status [Z51.5]  Not Applicable   • Paroxysmal atrial fibrillation [I48.0]  Yes   • COPD (chronic obstructive pulmonary disease) [J44.9]  Yes   • Chronic respiratory failure with hypoxia and hypercapnia [J96.11, J96.12]  Yes   • Stage 3b chronic kidney disease [N18.32]  Yes   • Schizophrenia [F20.9]  Yes   • Essential hypertension [I10]  Yes   • Mass of left lung [R91.8]  Yes      Resolved Hospital Problems    Diagnosis Date Resolved POA   • Acute prostatitis/cystitis without hematuria [N30.00] 2023 Yes   • Acute metabolic encephalopathy [G93.41] 2023 Yes   • MARYJANE (acute kidney injury) [N17.9] 2023 Yes      History of Present Illness  as written by the admitting team on 2023  Mr. Tolliver is a 63 y.o. non-smoker with a history of chronic diastolic CHF, previous V-fib arrest, schizophrenia, essential hypertension, chronic respiratory failure, CKD stage III, microcytic anemia, HLD, PE, seizure disorder, CVA, and atrial fibrillation on chronic anticoagulation that presents to [facility] for psych evaluation.  Patient is a nursing home resident and staff noted today abnormal behavior including aggressiveness with staff and  talking to self.  Upon presentation to the ED, he was altered and actively having hallucinations.  Work-up revealed an acute UTI.  CT A/P shows acute prostatitis and cystitis.  There is a large volume of formed stool within the rectum, fecal impaction suspected.  Labs obtained showed troponin of 25, sodium 148, creat 2.60, BUN 58, and hemoglobin 10.2.    Hospital Course:  Kodi Tolliver is a 63 y.o. male presented to the hospital with symptoms as per above.  He was initially thought to have metabolic encephalopathy and was treated initially with ceftriaxone for acute cystitis.  Psychiatry followed and titrated psychiatric medications to help with agitation.  Patient did require restraints intermittently but is much better currently.    Patient was found to have large lung mass.  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  Oncology met with his family and ultimately it was decided that he will pursue comfort focused treatments and has adjusted his CODE STATUS to DNR/DNI with family discussion.  Family is meeting with hospice today to determine if they wish to pursue hospice care at his facility.  Either way he will continue on comfort focused care at the facility and we have stopped none comfort focused medications at this time per their wishes.    Hyponatremia was treated.  Most recent sodium was normal.    Patient had atrial fibrillation with RVR and cardiology consult team assisted.  He will continue metoprolol as per below for comfort.  For comfort measures anticoagulation discontinued.    Patient now is reasonably hemodynamically stable to return to his long care facility for comfort measures treatment after family meets with hospice team later today.  Case management is arranging transportation.    At the time of discharge patient was told to take all medications as prescribed, keep all follow-up appointments, and call their doctor or return to the hospital with any worsening or concerning  symptoms.    Please note that this note was made using Dragon voice recognition software            Day of Discharge     Subjective: Patient resting calmly in bed.  He is a poor historian.  He denies any new complaints.  No current pain or anxiety reported.    Vital Signs:   Temp:  [97.7 °F (36.5 °C)-99.3 °F (37.4 °C)] 97.7 °F (36.5 °C)  Heart Rate:  [94-95] 94  Resp:  [16] 16  BP: (110-138)/(68-75) 110/68     Physical Exam:  Constitutional:Awake, alert, chronically ill-appearing but nontoxic HENT: NCAT, mucous membranes moist, neck supple  Respiratory: No cough, occasional coarse sounds, no wheezes, nonlabored breathing   Cardiovascular: Pulse rate is normal, palpable radial pulses  Musculoskeletal: Somewhat chronically to be in appearance, BMI is 22, no lower extremity edema   psychiatric:  Reasonably calm affect, cooperative, conversational  Neurologic:  Poor historian, partially oriented, no slurred speech or facial droop, follows commands  Skin: No rashes or jaundice, warm    Pertinent  and/or Most Recent Results     Results from last 7 days   Lab Units 04/29/23  1025   WBC 10*3/mm3 9.32   HEMOGLOBIN g/dL 8.4*   HEMATOCRIT % 27.8*   PLATELETS 10*3/mm3 302   SODIUM mmol/L 142   POTASSIUM mmol/L 4.3   CHLORIDE mmol/L 100   CO2 mmol/L 37.0*   BUN mg/dL 12   CREATININE mg/dL 0.92   GLUCOSE mg/dL 103*   CALCIUM mg/dL 8.9     Results from last 7 days   Lab Units 04/29/23  1025   BILIRUBIN mg/dL 0.2   ALK PHOS U/L 54   ALT (SGPT) U/L 15   AST (SGOT) U/L 24           Invalid input(s): TG, LDLCALC, LDLREALC        Brief Urine Lab Results  (Last result in the past 365 days)      Color   Clarity   Blood   Leuk Est   Nitrite   Protein   CREAT   Urine HCG        04/23/23 1619 Yellow   Clear   Negative   Moderate (2+)   Positive   Trace                     Imaging Results (All)     Procedure Component Value Units Date/Time    XR Chest 1 View [[id number]] Collected: 05/01/23 1757     Updated: 05/01/23 1803    Narrative:       XR CHEST 1 VW-     HISTORY: Male who is 63 years-old,  infiltrates, left perihilar  mass/neoplasm     TECHNIQUE: Frontal view of the chest     COMPARISON: 04/27/2023     FINDINGS: Heart, mediastinum and pulmonary vasculature are unremarkable.  Left perihilar mass is again demonstrated, with slightly decreased  adjacent infiltrate. No pleural effusion or pneumothorax. Right  hemidiaphragm is elevated. No acute osseous process.       Impression:      As described.     This report was finalized on 5/1/2023 6:00 PM by Dr. Mirza Weaver M.D.       XR Chest 1 View [[id number]] Collected: 04/27/23 1706     Updated: 04/27/23 1711    Narrative:      XR CHEST 1 VW-clinical: Status post left lingula biopsy     COMPARISON 04/23/2023 chest radiograph and 04/24/2023 CT chest     FINDINGS: Left perihilar tumor is again demonstrated with a vague amount  of adjacent infiltrate as before. No left-sided pleural effusion or  pneumothorax demonstrated. The cardiomediastinal silhouette is  satisfactory in appearance of the right lung is clear.     CONCLUSION: No pleural effusion or pneumothorax status post left lung  biopsy.     This report was finalized on 4/27/2023 5:08 PM by Dr. Sukhdev Barkley M.D.       CT Chest Without Contrast Diagnostic [[id number]] Collected: 04/24/23 1433     Updated: 04/24/23 1453    Narrative:      CT CHEST WITHOUT CONTRAST     HISTORY: 6 cm left perihilar mass     TECHNIQUE: Radiation dose reduction techniques were utilized, including  automated exposure control and exposure modulation based on body size.   3 mm images were obtained through the chest without the administration  of IV contrast. Lack of IV contrast limits evaluation of mediastinal,  hilar, and vascular structures. Sensitivity for underlying lesions and  infection decreased. Image quality is somewhat degraded by streak/motion  artifact.     COMPARISON: 04/23/2023     FINDINGS:     Thoracic inlet: Within normal noncontrast limits.     Heart  and great vessels: The heart size is stable. Atherosclerosis  including coronary artery calcifications.     Lymphatics/lung parenchyma/pleural space.     Evaluation is limited by extensive respiratory motion artifact. 3.9 x  5.9 cm left perihilar mass (axial image 50) with narrowing of the left  upper lobe bronchus and patchy groundglass reticulonodular groundglass  opacities in the periphery of the left upper lobe and both lung bases.  Pleural and parenchymal scarring. Mild bronchial wall thickening and  dilatation. No significant pleural fluid. No pneumothorax.     Upper abdomen: Small hiatal hernia with thickening of the distal  esophagus. Ectasia of the abdominal aorta incompletely imaged. Stable  nodular thickening left adrenal gland. Noncontrast imaging of the upper  abdomen is otherwise unchanged. Please refer to the separate dictated  report of the abdomen performed on 04/03/2023.        Bone windows: Multilevel degenerative changes. If there is concern for  osseous metastatic disease, recommend bone scan.          Impression:      Impression:     1.  Left perihilar 3.9 x 5 9 cm mass/neoplasm concerning with narrowing  of the left upper lobe bronchus and and multifocal patchy groundglass  opacities in the left upper and lower lobes, superimposed infection not  excluded. Management options include biopsy (via CT or bronchoscopy) and  PET imaging.  2.  Please see above for additional findings/recommendations.     I discussed these findings with Kirsty Escalante RN at 1:48 PM on  01/24/2023.     This report was finalized on 4/24/2023 2:50 PM by Dr. Rin Cifuentes M.D.       CT Abdomen Pelvis Without Contrast [[id number]] Collected: 04/23/23 1851     Updated: 04/24/23 0842    Narrative:      CT ABDOMEN AND PELVIS WITHOUT IV CONTRAST     HISTORY: 63-year-old male with UTI. Renal failure.     TECHNIQUE: Radiation dose reduction techniques were utilized, including  automated exposure control and exposure modulation  based on body size.   3 mm images were obtained through the abdomen and pelvis without the  administration of IV contrast. There are no priors for comparison.     FINDINGS: Extensive breathing artifact. There are no renal or ureteral  stones and there is no hydronephrosis bilaterally. The urinary bladder  is mildly distended and has a thickened wall. There is haziness  surrounding the prostate which measures approximately 5 cm in diameter.  There is a large volume of formed stool in the rectosigmoid colon with  anterior mass effect on the urinary bladder. There is otherwise an  average volume of formed stool within the colon. There is a mild colonic  ileus. The appendix appears normal. There is a 3.8 cm infrarenal  abdominal aortic aneurysm which extends to the bifurcation. There is a  tiny umbilical hernia containing fat.       Impression:      1. There is likely acute prostatitis and cystitis. Please correlate  clinically. There are no renal or ureteral stones and there is no  hydronephrosis bilaterally.  2. Large volume of formed stool within the rectum with anterior mass  effect on the urinary bladder. Fecal impaction is suspected.  3. There is a 3.8 cm infrarenal abdominal aortic aneurysm.     This report was finalized on 4/24/2023 8:38 AM by Dr. Laurence Stallings M.D.       XR Chest 1 View [[id number]] Collected: 04/23/23 1721     Updated: 04/24/23 0841    Narrative:      XR CHEST 1 VW-     HISTORY: 63-year-old male with altered mental status.     FINDINGS: There is an approximately 6 cm mass in the perihilar region of  the left upper lobe. Both bairon appear prominent and may represent  lymphadenopathy. There is no evidence for CHF. Further evaluation with  chest CT is recommended.     This report was finalized on 4/24/2023 8:38 AM by Dr. Laurence Stallings M.D.       CT Head Without Contrast [[id number]] Collected: 04/23/23 1833     Updated: 04/23/23 1833    Narrative:        Patient: ELSY LEE  Time Out: 18:33  Exam(s):  CT HEAD Without Contrast     EXAM:    CT Head Without Intravenous Contrast    CLINICAL HISTORY:     Reason for exam: AMS.    TECHNIQUE:    Axial computed tomography images of the head brain without intravenous   contrast.  CTDI is 55.44 mGy and DLP is 1026.2 mGy-cm.  This CT exam was   performed according to the principle of ALARA (As Low As Reasonably   Achievable) by using one or more of the following dose reduction   techniques: automated exposure control, adjustment of the mA and or kV   according to patient size, and or use of iterative reconstruction   technique.    COMPARISON:    No relevant prior studies available.    FINDINGS:    Brain:  Unremarkable.  No hemorrhage.  No significant white matter   disease.  No edema.    Ventricles:  Unremarkable.  No ventriculomegaly.    Bones joints:  Unremarkable.  No acute fracture.    Soft tissues:  Unremarkable.    Sinuses:  Unremarkable as visualized.  No acute sinusitis.    Mastoid air cells:  Unremarkable as visualized.  No mastoid effusion.    IMPRESSION:         Normal head brain CT.      Impression:          Electronically signed by Yoan Pardo MD on 04-23-23 at 1833            Discharge Details        Discharge Medications      New Medications      Instructions Start Date   aluminum-magnesium hydroxide-simethicone 400-400-40 MG/5ML suspension  Commonly known as: MAALOX MAX   15 mL, Oral, Every 6 Hours PRN      sennosides-docusate 8.6-50 MG per tablet  Commonly known as: PERICOLACE   2 tablets, Oral, Daily         Changes to Medications      Instructions Start Date   melatonin 1 MG tablet  What changed:   · when to take this  · reasons to take this   3 mg, Oral, Nightly      metoprolol tartrate 25 MG tablet  Commonly known as: LOPRESSOR  What changed:   · medication strength  · how much to take  · how to take this  · when to take this  · additional instructions   Take 50 mg metoprolol in the morning and 25 mg metoprolol at bedtime      Valproic Acid 250  MG/5ML solution syrup  Commonly known as: DEPAKENE  What changed: when to take this   250 mg, Oral, 3 Times Daily         Continue These Medications      Instructions Start Date   acetaminophen 325 MG tablet  Commonly known as: TYLENOL   650 mg, Oral, Every 6 Hours PRN      lansoprazole 30 MG capsule  Commonly known as: PREVACID   30 mg, Oral, Daily      levETIRAcetam 500 MG tablet  Commonly known as: KEPPRA   500 mg, Oral, 2 Times Daily      Milk of Magnesia 400 MG/5ML suspension  Generic drug: magnesium hydroxide   30 mL, Oral, Daily PRN      OLANZapine 10 MG tablet  Commonly known as: zyPREXA   10 mg, Oral, 2 Times Daily      Ventolin  (90 Base) MCG/ACT inhaler  Generic drug: albuterol sulfate HFA   2 puffs, Inhalation, Every 4 Hours PRN         Stop These Medications    apixaban 5 MG tablet tablet  Commonly known as: ELIQUIS     atorvastatin 20 MG tablet  Commonly known as: LIPITOR     levoFLOXacin 750 MG tablet  Commonly known as: LEVAQUIN     vitamin D 1.25 MG (00490 UT) capsule capsule  Commonly known as: ERGOCALCIFEROL            No Known Allergies      Discharge Disposition:  Long Term Care (DC - External)    Diet:  Hospital:  Diet Order   Procedures   • Diet: Cardiac Diets; Healthy Heart (2-3 Na+); Texture: Soft to Chew (NDD 3); Soft to Chew: Chopped Meat; Fluid Consistency: Thin (IDDSI 0)       Activity:  Activity Instructions     Activity as Tolerated                 CODE STATUS:    Code Status and Medical Interventions:   Ordered at: 05/02/23 0992     Level Of Support Discussed With:    Health Care Surrogate     Code Status (Patient has no pulse and is not breathing):    No CPR (Do Not Attempt to Resuscitate)     Medical Interventions (Patient has pulse or is breathing):    Comfort Measures     Release to patient:    Routine Release       Additional Instructions for the Follow-ups that You Need to Schedule     Discharge Follow-up with Specified Provider: Follow-up with hospice provider within 1  week or primary care provider   As directed      To: Follow-up with hospice provider within 1 week or primary care provider            Contact information for follow-up providers     Provider, No Known .    Contact information:  [facility] [id number] [phone]                   Contact information for after-discharge care     [facility]    Service: [facility]  Contact information:  [address]  [facility] [address] [phone]                                 Bolivar Bynum MD  05/03/23      Time Spent on Discharge:  I spent greater than 35 minutes on this discharge activity which included: face-to-face encounter with the patient, reviewing the data in the system, coordination of the care with the nursing staff as well as consultants, documentation, and entering orders.

## 2023-05-03 NOTE — PROGRESS NOTES
Discharge Planning Assessment  [facility]     Patient Name: Kodi Tolliver  MRN: [id number]  Today's Date: 5/3/2023    Admit Date: 4/23/2023    Plan: Pending Hospital course; pt from [facility]   Discharge Needs Assessment    No documentation.                Discharge Plan     Row Name 05/03/23 1400       Plan    Plan Comments I have tried to call the patient's daughter twice and no answer so far. SHERINE Canales RN CCP.    Row Name 05/03/23 1105       Plan    Plan Comments Placed call to the patient's daughter/Gabi Nam and left a secure voice mail to review the discharge plans. SHERINE Canales RN, CCP.              Continued Care and Services - Admitted Since 4/23/2023     Destination Coordination complete.    Service Provider Request Status Selected Services Address Phone Fax Patient Preferred    [facility]  Selected Intermediate Care [address] [address] [phone] --              Expected Discharge Date and Time     Expected Discharge Date Expected Discharge Time    May 3, 2023          Demographic Summary    No documentation.                Functional Status    No documentation.                Psychosocial    No documentation.                Abuse/Neglect    No documentation.                Legal    No documentation.                Substance Abuse    No documentation.                Patient Forms    No documentation.                   Loretta Canales RN

## 2023-05-03 NOTE — PROGRESS NOTES
Discharge Planning Assessment  [facility]     Patient Name: Kodi Tolliver  MRN: [id number]  Today's Date: 5/3/2023    Admit Date: 4/23/2023    Plan: Pending Hospital course; pt from [facility]   Discharge Needs Assessment    No documentation.                Discharge Plan     Row Name 05/03/23 1601       Plan    Plan Comments Spoke with the patient's daughter and she is agreeable to the discharge plans. She was udpated that [facility] to meet with the patient's sister tomorrow at 10:00 and she will call in so that she can be part of the meeting. The daughter gave permission for her Aunt/Katie to sign the ambulance DNR. Katie came by the hospital and signed the ambulance DNR. Faxed the DNR to [facility], placed copy in the patient's chart and attached the original to the ambulance form for transportation. Discharge packet ready and given to RN. [facility] Freeman/[facility]ernandez states they can accept him back to a LTC, medicaid, palliative bed today and no COVID test is needed. SHERINE Canales RN, CCP.    Final Discharge Disposition Code 04 - intermediate care facility    Final Note Discharged to [facility] medicaid bed, palliative and [facility] to transport today. SHERINE Canales RN CCP.    Row Name 05/03/23 1400       Plan    Plan Comments I have tried to call the patient's daughter twice and no answer so far. SHERINE Canales RN CCP.              Continued Care and Services - Admitted Since 4/23/2023     Destination Coordination complete.    Service Provider Request Status Selected Services Address Phone Fax Patient Preferred    [facility]  [facility] [address] [facility] [facility] [address] [phone] --              Expected Discharge Date and Time     Expected Discharge Date Expected Discharge Time    May 3, 2023          Demographic Summary    No documentation.                Functional Status    No documentation.                Psychosocial    No documentation.                Abuse/Neglect     No documentation.                Legal    No documentation.                Substance Abuse    No documentation.                Patient Forms    No documentation.                   Loretta Canales RN

## 2023-05-03 NOTE — PROGRESS NOTES
"Physicians Statement of Medical Necessity for  Ambulance Transportation    GENERAL INFORMATION     Name: Kodi Tolliver  YOB: 1960  Medicare #: [id number]  And [place] medicaid #[id number]  Transport Date: 5/3/23 @ 17:00 (Valid for round trips this date, or for scheduled repetitive trips for 60 days from the date signed below.)  Origin: [address]    Destination: [facility], [address] [place] [id number] phone [phone]  Is the Patient's stay covered under Medicare Part A (PPS/DRG?)Yes  Closest appropriate facility? Yes  If this a hosp-hosp transfer? No  Is this a hospice patient? No    MEDICAL NECESSITY QUESTIONAIRE    Ambulance Transportation is medically necessary only if other means of transportation are contraindicated or would be potentially harmful to the patient.  To meet this requirement, the patient must be either \"bed confined\" or suffer from a condition such that transport by means other than an ambulance is contraindicated by the patient's condition.  The following questions must be answered by the healthcare professional signing below for this form to be valid:     1) Describe the MEDICAL CONDITION (physical and/or mental) of this patient AT THE TIME OF AMBULANCE TRANSPORT that requires the patient to be transported in an ambulance, and why transport by other means is contraindicated by the patient's condition: stable 2 liters of oxygen  Past Medical History:   Diagnosis Date   • Anxiety    • Aphonia    • Atrial fibrillation    • CHF (congestive heart failure)    • COPD (chronic obstructive pulmonary disease)    • Depression    • Essential hypertension    • Schizophrenia       Past Surgical History:   Procedure Laterality Date   • BRONCHOSCOPY Left 4/27/2023    Procedure: BRONCHOSCOPY with brushings and biopsies;  Surgeon: Kory Bey MD;  Location: [facility] ENDOSCOPY;  Service: Pulmonary;  Laterality: Left;  pre- lung mass  post- same    " "  2) Is this patient \"bed confined\" as defined below?Yes   To be \"bed confined\" the patient must satisfy all three of the following criteria:  (1) unable to get up from bed without assistance; AND (2) unable to ambulate;  AND (3) unable to sit in a chair or wheelchair.  3) Can this patient safely be transported by car or wheelchair van (I.e., may safely sit during transport, without an attendant or monitoring?)No   4. In addition to completing questions 1-3 above, please check any of the following conditions that apply*:          *Note: supporting documentation for any boxes checked must be maintained in the patient's medical records Patient is confused      SIGNATURE OF PHYSICIAN OR OTHER AUTHORIZED HEALTHCARE PROFESSIONAL    I certify that the above information is true and correct based on my evaluation of this patient, and represent that the patient requires transport by ambulance and that other forms of transport are contraindicated.  I understand that this information will be used by the Centers for Medicare and Medicaid Services (CMS) to support the determiniation of medical necessity for ambulance services, and I represent that I have personal knowledge of the patient's condition at the time of transport.     X   If this box is checked, I also certify that the patient is physically or mentally incapable of signing the ambulance service's claim form and that the institution with which I am affiliated has furnished care, services or assistance to the patient.  My signature below is made on behalf of the patient pursuant to 42 .36(b)(4). In accordance with 42 .37, the specific reason(s) that the patient is physically or mentally incapable of signing the claim for is as follows:     Signature of Physician or Healthcare Professional  Date/Time: 5/3/23 @ 17:00     (For Scheduled repetitive transport, this form is not valid for transports performed more than 60 days after this date).                    "                                                                                                                         --------------------------------------------------------------------------------------------  Printed Name and Credentials of Physician or Authorized Healthcare Professional     *Form must be signed by patient's attending physician for scheduled, repetitive transports,.  For non-repetitive ambulance transports, if unable to obtain the signature of the attending physician, any of the following may sign (please select below):     Physician  Clinical Nurse Specialist  X Registered Nurse     Physician Assistant  Discharge Planner  Licensed Practical Nurse     Nurse Practitioner

## 2023-05-03 NOTE — PROGRESS NOTES
Discharge Planning Assessment  [facility]     Patient Name: Kodi Tolliver  MRN: [id number]  Today's Date: 5/3/2023    Admit Date: 4/23/2023    Plan: Pending Hospital course; pt from [facility]   Discharge Needs Assessment    No documentation.                Discharge Plan     Row Name 05/03/23 1640       Plan    Plan Comments Spoke with the patient's sister and she will notify the patient's daughter in the delay for transportation until 5/4/23 @ 07:30 by [facility]. Notified Dr. Bynum also. SHERINE Canales RN, CCP.    Final Discharge Disposition Code [address] [place] [address]    Row Name 05/03/23 1601       Plan    Plan Comments Spoke with the patient's daughter and she is agreeable to the discharge plans. She was udpated that [facility] to meet with the patient's sister tomorrow at 10:00 and she will call in so that she can be part of the meeting. The daughter gave permission for her Aunt/Katie to sign the ambulance DNR. Katie came by the hospital and signed the ambulance DNR. Faxed the DNR to [facility], placed copy in the patient's chart and attached the original to the ambulance form for transportation. Discharge packet ready and given to RN. [facility] Freeman/Hernandez states they can accept him back to a LTC, medicaid, palliative bed today and no COVID test is needed. [facility] just called and they have an emergency and will need to move this patient to 5/4/23 @ 07:30. Notified RN and she will notified the family SHERINE Canales RN, CCP.    Final Discharge Disposition Code [address] [place] [address]    Final Note Discharged to [facility] medicaid bed, palliative and [facility] to transport 5/4/23 @ 07:30. SHERINE Canales RN CCP.    Row Name 05/03/23 1400       Plan    Plan Comments I have tried to call the patient's daughter twice and no answer so far. SHERINE Canales RN CCP.              Continued Care and Services - Admitted Since 4/23/2023     Destination Coordination complete.    Service Provider Request Status Selected  Services Address Phone Fax Patient Preferred    [facility]  Selected Intermediate Care [address] [facility] [address] [phone] --              Expected Discharge Date and Time     Expected Discharge Date Expected Discharge Time    May 3, 2023          Demographic Summary    No documentation.                Functional Status    No documentation.                Psychosocial    No documentation.                Abuse/Neglect    No documentation.                Legal    No documentation.                Substance Abuse    No documentation.                Patient Forms    No documentation.                   Loretta Canales RN

## 2023-05-03 NOTE — PROGRESS NOTES
The patient is calm and out of restraints today.  He is oriented to person and place and is pleasant during today's interview.  Charting indicates discharged today to [facility] with continued hospice follow-up.  I will sign off.

## 2023-05-04 VITALS
RESPIRATION RATE: 16 BRPM | HEIGHT: 71 IN | HEART RATE: 82 BPM | OXYGEN SATURATION: 94 % | BODY MASS INDEX: 22.31 KG/M2 | WEIGHT: 159.4 LBS | TEMPERATURE: 97.3 F | DIASTOLIC BLOOD PRESSURE: 65 MMHG | SYSTOLIC BLOOD PRESSURE: 114 MMHG

## 2023-05-04 LAB
MYCOBACTERIUM SPEC CULT: NORMAL
NIGHT BLUE STAIN TISS: NORMAL

## 2023-05-04 NOTE — PLAN OF CARE
Goal Outcome Evaluation:  Plan of Care Reviewed With: patient        Progress: no change  Outcome Evaluation: Patient with stable vital signs, denied pain throughout shift. Urinating adequately. Had one incontinent bowel movement overnight. No visitors at bedside, no PRNs needed. Plan to discharge to home facility in AM with EMS. Will continue to monitor.

## 2023-05-04 NOTE — DISCHARGE SUMMARY
[facility] Medicine Services  DISCHARGE SUMMARY    Patient Name: Kodi Tolliver  : 1960  MRN: [id number]    Date of Admission: 2023  3:45 PM  Date of Discharge:   2023    Consults     Date and Time Order Name Status Description    2023  3:00 PM Inpatient Thoracic Surgery Consult Completed     2023  3:00 PM Hematology & Oncology Inpatient Consult Completed     2023  5:46 AM Inpatient Pulmonology Consult Completed     2023 11:34 PM Inpatient Cardiology Consult Completed     2023 10:18 PM Inpatient Psychiatrist Consult Completed           Hospital Course       Active Hospital Problems    Diagnosis  POA   • **Malignant neoplasm of upper lobe of left lung [C34.12]  Yes   • DNR (do not resuscitate) [Z66]  Yes   • Comfort measures only status [Z51.5]  Not Applicable   • Paroxysmal atrial fibrillation [I48.0]  Yes   • COPD (chronic obstructive pulmonary disease) [J44.9]  Yes   • Chronic respiratory failure with hypoxia and hypercapnia [J96.11, J96.12]  Yes   • Stage 3b chronic kidney disease [N18.32]  Yes   • Schizophrenia [F20.9]  Yes   • Essential hypertension [I10]  Yes   • Mass of left lung [R91.8]  Yes      Resolved Hospital Problems    Diagnosis Date Resolved POA   • Acute prostatitis/cystitis without hematuria [N30.00] 2023 Yes   • Acute metabolic encephalopathy [G93.41] 2023 Yes   • MARYJANE (acute kidney injury) [N17.9] 2023 Yes      History of Present Illness  as written by the admitting team on 2023  Mr. Tolliver is a 63 y.o. non-smoker with a history of chronic diastolic CHF, previous V-fib arrest, schizophrenia, essential hypertension, chronic respiratory failure, CKD stage III, microcytic anemia, HLD, PE, seizure disorder, CVA, and atrial fibrillation on chronic anticoagulation that presents to [facility] for psych evaluation.  Patient is a nursing home resident and staff noted today abnormal behavior including aggressiveness with staff and  talking to self.  Upon presentation to the ED, he was altered and actively having hallucinations.  Work-up revealed an acute UTI.  CT A/P shows acute prostatitis and cystitis.  There is a large volume of formed stool within the rectum, fecal impaction suspected.  Labs obtained showed troponin of 25, sodium 148, creat 2.60, BUN 58, and hemoglobin 10.2.    Hospital Course:  Kodi Tolliver is a 63 y.o. male presented to the hospital with symptoms as per above.  He was initially thought to have metabolic encephalopathy and was treated initially with ceftriaxone for acute cystitis.  Psychiatry followed and titrated psychiatric medications to help with agitation.  Patient did require restraints intermittently but is much better currently.    Patient was found to have large lung mass.  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  Oncology met with his family and ultimately it was decided that he will pursue comfort focused treatments and has adjusted his CODE STATUS to DNR/DNI with family discussion.  Family is meeting with hospice today to determine if they wish to pursue hospice care at his facility.  Either way he will continue on comfort focused care at the facility and we have stopped none comfort focused medications at this time per their wishes.    Hyponatremia was treated.  Most recent sodium was normal.    Patient had atrial fibrillation with RVR and cardiology consult team assisted.  He will continue metoprolol as per below for comfort.  For comfort measures anticoagulation discontinued.    Patient now is reasonably hemodynamically stable to return to his long care facility for comfort measures treatment after family meets with hospice team later today.  Case management is arranging transportation.    At the time of discharge patient was told to take all medications as prescribed, keep all follow-up appointments, and call their doctor or return to the hospital with any worsening or concerning  symptoms.    Please note that this note was made using Dragon voice recognition software      Addendum: Unable to arrange transportation to [facility] on 5/3.  Transportation has been arranged for this morning 5/4.  Patient continues to be stable on current medication treatment plan as per below.  He is reasonably calm and takes redirection from staff.  He has occasional visual hallucinations but reportedly this is chronic and does not seem to be interfering with patient care.      Day of Discharge     Subjective: Remains calm, working with staff.  Denies complaint.    Vital Signs:   Temp:  [97.3 °F (36.3 °C)-97.7 °F (36.5 °C)] 97.3 °F (36.3 °C)  Heart Rate:  [82-94] 82  Resp:  [16] 16  BP: (110-114)/(65-68) 114/65     Physical Exam:  Constitutional:Awake, alert, chronically ill-appearing but nontoxic HENT: NCAT, mucous membranes moist, neck supple  Respiratory: No cough, occasional coarse sounds, no wheezes, nonlabored breathing   Cardiovascular: Pulse rate is normal, palpable radial pulses  Musculoskeletal: Somewhat chronically to be in appearance, BMI is 22, no lower extremity edema   psychiatric:  Reasonably calm affect, cooperative, conversational  Neurologic:  Poor historian, partially oriented, no slurred speech or facial droop, follows commands  Skin: No rashes or jaundice, warm  Examination unchanged    Pertinent  and/or Most Recent Results     Results from last 7 days   Lab Units 04/29/23  1025   WBC 10*3/mm3 9.32   HEMOGLOBIN g/dL 8.4*   HEMATOCRIT % 27.8*   PLATELETS 10*3/mm3 302   SODIUM mmol/L 142   POTASSIUM mmol/L 4.3   CHLORIDE mmol/L 100   CO2 mmol/L 37.0*   BUN mg/dL 12   CREATININE mg/dL 0.92   GLUCOSE mg/dL 103*   CALCIUM mg/dL 8.9     Results from last 7 days   Lab Units 04/29/23  1025   BILIRUBIN mg/dL 0.2   ALK PHOS U/L 54   ALT (SGPT) U/L 15   AST (SGOT) U/L 24           Invalid input(s): TG, LDLCALC, LDLREALC        Brief Urine Lab Results  (Last result in the past 365 days)      Color    Clarity   Blood   Leuk Est   Nitrite   Protein   CREAT   Urine HCG        04/23/23 1619 Yellow   Clear   Negative   Moderate (2+)   Positive   Trace                     Imaging Results (All)     Procedure Component Value Units Date/Time    XR Chest 1 View [[id number]] Collected: 05/01/23 1757     Updated: 05/01/23 1803    Narrative:      XR CHEST 1 VW-     HISTORY: Male who is 63 years-old,  infiltrates, left perihilar  mass/neoplasm     TECHNIQUE: Frontal view of the chest     COMPARISON: 04/27/2023     FINDINGS: Heart, mediastinum and pulmonary vasculature are unremarkable.  Left perihilar mass is again demonstrated, with slightly decreased  adjacent infiltrate. No pleural effusion or pneumothorax. Right  hemidiaphragm is elevated. No acute osseous process.       Impression:      As described.     This report was finalized on 5/1/2023 6:00 PM by Dr. Mirza Weaver M.D.       XR Chest 1 View [[id number]] Collected: 04/27/23 1706     Updated: 04/27/23 1711    Narrative:      XR CHEST 1 VW-clinical: Status post left lingula biopsy     COMPARISON 04/23/2023 chest radiograph and 04/24/2023 CT chest     FINDINGS: Left perihilar tumor is again demonstrated with a vague amount  of adjacent infiltrate as before. No left-sided pleural effusion or  pneumothorax demonstrated. The cardiomediastinal silhouette is  satisfactory in appearance of the right lung is clear.     CONCLUSION: No pleural effusion or pneumothorax status post left lung  biopsy.     This report was finalized on 4/27/2023 5:08 PM by Dr. Skuhdev Barkley M.D.       CT Chest Without Contrast Diagnostic [[id number]] Collected: 04/24/23 1433     Updated: 04/24/23 1453    Narrative:      CT CHEST WITHOUT CONTRAST     HISTORY: 6 cm left perihilar mass     TECHNIQUE: Radiation dose reduction techniques were utilized, including  automated exposure control and exposure modulation based on body size.   3 mm images were obtained through the chest without the  administration  of IV contrast. Lack of IV contrast limits evaluation of mediastinal,  hilar, and vascular structures. Sensitivity for underlying lesions and  infection decreased. Image quality is somewhat degraded by streak/motion  artifact.     COMPARISON: 04/23/2023     FINDINGS:     Thoracic inlet: Within normal noncontrast limits.     Heart and great vessels: The heart size is stable. Atherosclerosis  including coronary artery calcifications.     Lymphatics/lung parenchyma/pleural space.     Evaluation is limited by extensive respiratory motion artifact. 3.9 x  5.9 cm left perihilar mass (axial image 50) with narrowing of the left  upper lobe bronchus and patchy groundglass reticulonodular groundglass  opacities in the periphery of the left upper lobe and both lung bases.  Pleural and parenchymal scarring. Mild bronchial wall thickening and  dilatation. No significant pleural fluid. No pneumothorax.     Upper abdomen: Small hiatal hernia with thickening of the distal  esophagus. Ectasia of the abdominal aorta incompletely imaged. Stable  nodular thickening left adrenal gland. Noncontrast imaging of the upper  abdomen is otherwise unchanged. Please refer to the separate dictated  report of the abdomen performed on 04/03/2023.        Bone windows: Multilevel degenerative changes. If there is concern for  osseous metastatic disease, recommend bone scan.          Impression:      Impression:     1.  Left perihilar 3.9 x 5 9 cm mass/neoplasm concerning with narrowing  of the left upper lobe bronchus and and multifocal patchy groundglass  opacities in the left upper and lower lobes, superimposed infection not  excluded. Management options include biopsy (via CT or bronchoscopy) and  PET imaging.  2.  Please see above for additional findings/recommendations.     I discussed these findings with Kirsty Escalante RN at 1:48 PM on  01/24/2023.     This report was finalized on 4/24/2023 2:50 PM by Dr. Rin Cifuentes M.D.        CT Abdomen Pelvis Without Contrast [[id number]] Collected: 04/23/23 1851     Updated: 04/24/23 0842    Narrative:      CT ABDOMEN AND PELVIS WITHOUT IV CONTRAST     HISTORY: 63-year-old male with UTI. Renal failure.     TECHNIQUE: Radiation dose reduction techniques were utilized, including  automated exposure control and exposure modulation based on body size.   3 mm images were obtained through the abdomen and pelvis without the  administration of IV contrast. There are no priors for comparison.     FINDINGS: Extensive breathing artifact. There are no renal or ureteral  stones and there is no hydronephrosis bilaterally. The urinary bladder  is mildly distended and has a thickened wall. There is haziness  surrounding the prostate which measures approximately 5 cm in diameter.  There is a large volume of formed stool in the rectosigmoid colon with  anterior mass effect on the urinary bladder. There is otherwise an  average volume of formed stool within the colon. There is a mild colonic  ileus. The appendix appears normal. There is a 3.8 cm infrarenal  abdominal aortic aneurysm which extends to the bifurcation. There is a  tiny umbilical hernia containing fat.       Impression:      1. There is likely acute prostatitis and cystitis. Please correlate  clinically. There are no renal or ureteral stones and there is no  hydronephrosis bilaterally.  2. Large volume of formed stool within the rectum with anterior mass  effect on the urinary bladder. Fecal impaction is suspected.  3. There is a 3.8 cm infrarenal abdominal aortic aneurysm.     This report was finalized on 4/24/2023 8:38 AM by Dr. Laurence Stallings M.D.       XR Chest 1 View [[id number]] Collected: 04/23/23 1721     Updated: 04/24/23 0841    Narrative:      XR CHEST 1 VW-     HISTORY: 63-year-old male with altered mental status.     FINDINGS: There is an approximately 6 cm mass in the perihilar region of  the left upper lobe. Both bairon appear prominent and may  represent  lymphadenopathy. There is no evidence for CHF. Further evaluation with  chest CT is recommended.     This report was finalized on 4/24/2023 8:38 AM by Dr. Laurence Stallings M.D.       CT Head Without Contrast [[id number]] Collected: 04/23/23 1833     Updated: 04/23/23 1833    Narrative:        Patient: ELSY LEE  Time Out: 18:33  Exam(s): CT HEAD Without Contrast     EXAM:    CT Head Without Intravenous Contrast    CLINICAL HISTORY:     Reason for exam: AMS.    TECHNIQUE:    Axial computed tomography images of the head brain without intravenous   contrast.  CTDI is 55.44 mGy and DLP is 1026.2 mGy-cm.  This CT exam was   performed according to the principle of ALARA (As Low As Reasonably   Achievable) by using one or more of the following dose reduction   techniques: automated exposure control, adjustment of the mA and or kV   according to patient size, and or use of iterative reconstruction   technique.    COMPARISON:    No relevant prior studies available.    FINDINGS:    Brain:  Unremarkable.  No hemorrhage.  No significant white matter   disease.  No edema.    Ventricles:  Unremarkable.  No ventriculomegaly.    Bones joints:  Unremarkable.  No acute fracture.    Soft tissues:  Unremarkable.    Sinuses:  Unremarkable as visualized.  No acute sinusitis.    Mastoid air cells:  Unremarkable as visualized.  No mastoid effusion.    IMPRESSION:         Normal head brain CT.      Impression:          Electronically signed by Yoan Pardo MD on 04-23-23 at 1833            Discharge Details        Discharge Medications      New Medications      Instructions Start Date   aluminum-magnesium hydroxide-simethicone 400-400-40 MG/5ML suspension  Commonly known as: MAALOX MAX   15 mL, Oral, Every 6 Hours PRN      sennosides-docusate 8.6-50 MG per tablet  Commonly known as: PERICOLACE   2 tablets, Oral, Daily         Changes to Medications      Instructions Start Date   melatonin 1 MG tablet  What changed:   · when to  take this  · reasons to take this   3 mg, Oral, Nightly      metoprolol tartrate 25 MG tablet  Commonly known as: LOPRESSOR  What changed:   · medication strength  · how much to take  · how to take this  · when to take this  · additional instructions   Take 50 mg metoprolol in the morning and 25 mg metoprolol at bedtime      Valproic Acid 250 MG/5ML solution syrup  Commonly known as: DEPAKENE  What changed: when to take this   250 mg, Oral, 3 Times Daily         Continue These Medications      Instructions Start Date   acetaminophen 325 MG tablet  Commonly known as: TYLENOL   650 mg, Oral, Every 6 Hours PRN      lansoprazole 30 MG capsule  Commonly known as: PREVACID   30 mg, Oral, Daily      levETIRAcetam 500 MG tablet  Commonly known as: KEPPRA   500 mg, Oral, 2 Times Daily      Milk of Magnesia 400 MG/5ML suspension  Generic drug: magnesium hydroxide   30 mL, Oral, Daily PRN      OLANZapine 10 MG tablet  Commonly known as: zyPREXA   10 mg, Oral, 2 Times Daily      Ventolin  (90 Base) MCG/ACT inhaler  Generic drug: albuterol sulfate HFA   2 puffs, Inhalation, Every 4 Hours PRN         Stop These Medications    apixaban 5 MG tablet tablet  Commonly known as: ELIQUIS     atorvastatin 20 MG tablet  Commonly known as: LIPITOR     levoFLOXacin 750 MG tablet  Commonly known as: LEVAQUIN     vitamin D 1.25 MG (42751 UT) capsule capsule  Commonly known as: ERGOCALCIFEROL        Continuing medications for comfort as per above    No Known Allergies      Discharge Disposition:  Long Term Care (DC - External)    Diet:  Hospital:  Diet Order   Procedures   • Diet: Cardiac Diets; Healthy Heart (2-3 Na+); Texture: Soft to Chew (NDD 3); Soft to Chew: Chopped Meat; Fluid Consistency: Thin (IDDSI 0)       Activity:  Activity Instructions     Activity as Tolerated                 CODE STATUS:    Code Status and Medical Interventions:   Ordered at: 05/02/23 5032     Level Of Support Discussed With:    Health Care Surrogate      Code Status (Patient has no pulse and is not breathing):    No CPR (Do Not Attempt to Resuscitate)     Medical Interventions (Patient has pulse or is breathing):    Comfort Measures     Release to patient:    Routine Release       Additional Instructions for the Follow-ups that You Need to Schedule     Discharge Follow-up with Specified Provider: Follow-up with hospice provider within 1 week or primary care provider   As directed      To: Follow-up with hospice provider within 1 week or primary care provider            Contact information for follow-up providers     Provider, No Known .    Contact information:  [facility] [address] [phone]                   Contact information for after-discharge care     [facility]    Service: Intermediate Care  Contact information:  [address] [facility] [address] [phone]                                 Bolivar Bynum MD  05/04/23      Time Spent on Discharge:  I spent greater than 30 minutes on this discharge activity which included: face-to-face encounter with the patient, reviewing the data in the system, coordination of the care with the nursing staff as well as consultants, documentation, and entering orders.

## 2023-05-11 LAB
MYCOBACTERIUM SPEC CULT: NORMAL
NIGHT BLUE STAIN TISS: NORMAL

## 2023-05-14 LAB — FUNGUS WND CULT: ABNORMAL

## 2023-05-18 LAB
MYCOBACTERIUM SPEC CULT: NORMAL
NIGHT BLUE STAIN TISS: NORMAL

## 2023-05-25 LAB
MYCOBACTERIUM SPEC CULT: NORMAL
NIGHT BLUE STAIN TISS: NORMAL

## 2023-05-29 LAB — FUNGUS WND CULT: ABNORMAL

## 2023-06-01 LAB
MYCOBACTERIUM SPEC CULT: NORMAL
NIGHT BLUE STAIN TISS: NORMAL

## 2023-06-08 LAB
MYCOBACTERIUM SPEC CULT: NORMAL
NIGHT BLUE STAIN TISS: NORMAL

## (undated) DEVICE — TRAP,MUCUS SPECIMEN, 80CC: Brand: MEDLINE

## (undated) DEVICE — KT CATH SXN WHSTL OPN 2GLV 14F

## (undated) DEVICE — THE DISPOSABLE RAPTOR GRASPING DEVICE IS USED TO GRASP TISSUE AND/OR RETRIEVE FOREIGN BODIES, EXCISED TISSUE AND STENTS DURING ENDOSCOPIC PROCEDURES.: Brand: RAPTOR

## (undated) DEVICE — VITAL SIGNS™ JACKSON-REES CIRCUITS: Brand: VITAL SIGNS™

## (undated) DEVICE — SENSR O2 OXIMAX FNGR A/ 18IN NONSTR

## (undated) DEVICE — TUBING, SUCTION, 1/4" X 10', STRAIGHT: Brand: MEDLINE

## (undated) DEVICE — SINGLE USE SUCTION VALVE MAJ-209: Brand: SINGLE USE SUCTION VALVE (STERILE)

## (undated) DEVICE — ADAPT SWVL FIBROPTIC BRONCH

## (undated) DEVICE — MSK AIRWY LARYNG LMA PILOT SZ4

## (undated) DEVICE — SINGLE USE BIOPSY VALVE MAJ-210: Brand: SINGLE USE BIOPSY VALVE (STERILE)

## (undated) DEVICE — CONMED DISPOSABLE BRONCHIAL CYTOLOGY BRUSH, STRAIGHT HANDLE, 3 MM X 120 CM: Brand: CONMED

## (undated) DEVICE — ADAPT CLN BIOGUARD AIR/H2O DISP

## (undated) DEVICE — MASK,OXY,ADLT,NON-REB,SAFETY VENT,7,UC: Brand: MEDLINE